# Patient Record
Sex: FEMALE | Race: BLACK OR AFRICAN AMERICAN | Employment: OTHER | RURAL
[De-identification: names, ages, dates, MRNs, and addresses within clinical notes are randomized per-mention and may not be internally consistent; named-entity substitution may affect disease eponyms.]

---

## 2017-01-18 ENCOUNTER — OFFICE VISIT (OUTPATIENT)
Dept: FAMILY MEDICINE CLINIC | Age: 77
End: 2017-01-18

## 2017-01-18 ENCOUNTER — TELEPHONE (OUTPATIENT)
Dept: FAMILY MEDICINE CLINIC | Age: 77
End: 2017-01-18

## 2017-01-18 ENCOUNTER — DOCUMENTATION ONLY (OUTPATIENT)
Dept: FAMILY MEDICINE CLINIC | Age: 77
End: 2017-01-18

## 2017-01-18 VITALS
BODY MASS INDEX: 33.12 KG/M2 | OXYGEN SATURATION: 97 % | SYSTOLIC BLOOD PRESSURE: 173 MMHG | WEIGHT: 211 LBS | RESPIRATION RATE: 18 BRPM | TEMPERATURE: 97.4 F | DIASTOLIC BLOOD PRESSURE: 79 MMHG | HEART RATE: 77 BPM | HEIGHT: 67 IN

## 2017-01-18 DIAGNOSIS — R29.898 MUSCULAR DECONDITIONING: Primary | ICD-10-CM

## 2017-01-18 DIAGNOSIS — M48.061 LUMBAR SPINAL STENOSIS: ICD-10-CM

## 2017-01-18 DIAGNOSIS — E66.09 NON MORBID OBESITY DUE TO EXCESS CALORIES: ICD-10-CM

## 2017-01-18 DIAGNOSIS — M48.02 CERVICAL SPINAL STENOSIS: ICD-10-CM

## 2017-01-18 NOTE — ACP (ADVANCE CARE PLANNING)
Advance Care Planning (ACP) Provider Conversation Snapshot    Date of ACP Conversation: 01/18/17  Persons included in Conversation:  patient  Length of ACP Conversation in minutes:  2 minutes    Authorized Decision Maker (if patient is incapable of making informed decisions): This person is: Other Legally Authorized Decision Maker (e.g. Next of Kin)          For Patients with Decision Making Capacity:   Values/Goals: Exploration of values, goals, and preferences if recovery is not expected, even with continued medical treatment in the event of:  Imminent death  Severe, permanent brain injury  \"In these circumstances, what matters most to you? \"  Care focused more on comfort or quality of life.     Conversation Outcomes / Follow-Up Plan:   Recommended completion of Advance Directive form after review of ACP materials and conversation with prospective healthcare agent

## 2017-01-18 NOTE — MR AVS SNAPSHOT
Visit Information Date & Time Provider Department Dept. Phone Encounter #  
 1/18/2017 10:40 AM Lexx Jiang MD [facility] [phone] [id number] Upcoming Health Maintenance Date Due DTaP/Tdap/Td series (1 - Tdap) 4/24/1962 ZOSTER VACCINE AGE 60> 4/24/2001 GLAUCOMA SCREENING Q2Y 4/24/2006 OSTEOPOROSIS SCREENING (DEXA) 4/24/2006 Pneumococcal 65+ Low/Medium Risk (1 of 2 - PCV13) 4/24/2006 MEDICARE YEARLY EXAM 4/24/2006 INFLUENZA AGE 9 TO ADULT 8/1/2016 Allergies as of 1/18/2017  Review Complete On: 1/18/2017 By: Lexx Jiang MD  
  
 Severity Noted Reaction Type Reactions Aspirin  03/28/2016    Not Reported This Time Codeine  03/28/2016    Not Reported This Time Current Immunizations  Never Reviewed No immunizations on file. Not reviewed this visit You Were Diagnosed With   
  
 Codes Comments Muscular deconditioning    -  Primary ICD-10-CM: R08.408 ICD-9-CM: 781.99 Lumbar spinal stenosis     ICD-10-CM: M48.06 
ICD-9-CM: 724.02 Cervical spinal stenosis     ICD-10-CM: M48.02 
ICD-9-CM: 723.0 Non morbid obesity due to excess calories     ICD-10-CM: E66.09 
ICD-9-CM: 278.00 Vitals BP Pulse Temp Resp Height(growth percentile) Weight(growth percentile) 173/79 (BP 1 Location: Left arm, BP Patient Position: Sitting) 77 97.4 °F (36.3 °C) (Oral) 18 5' 7\" (1.702 m) 211 lb (95.7 kg) SpO2 BMI OB Status Smoking Status 97% 33.05 kg/m2 Postmenopausal Former Smoker Vitals History BMI and BSA Data Body Mass Index Body Surface Area 33.05 kg/m 2 2.13 m 2 Preferred Pharmacy Pharmacy Name Phone [facility] [address] [place] [address] [address] [phone] Your Updated Medication List  
  
   
This list is accurate as of: 1/18/17 11:57 AM.  Always use your most recent med list. amLODIPine 10 mg tablet Commonly known as:  Delaney Francoise Take  by mouth daily. CENTRUM SILVER PO Take  by mouth.  
  
 cyanocobalamin 500 mcg tablet Commonly known as:  VITAMIN B12 Take 500 mcg by mouth daily. HEALTHY COLON PO Take  by mouth. hydroCHLOROthiazide 50 mg tablet Commonly known as:  HYDRODIURIL Take 25 mg by mouth daily. losartan 100 mg tablet Commonly known as:  COZAAR Take 100 mg by mouth daily. naproxen 500 mg tablet Commonly known as:  NAPROSYN Take 1 Tab by mouth two (2) times daily (with meals). Indications: OSTEOARTHRITIS, PAIN  
  
 omeprazole 40 mg capsule Commonly known as:  PRILOSEC Take 40 mg by mouth daily. VITAMIN D2 PO Take  by mouth. * ZANTAC PO Take  by mouth. * RANITIDINE HCL PO Take  by mouth. * Notice: This list has 2 medication(s) that are the same as other medications prescribed for you. Read the directions carefully, and ask your doctor or other care provider to review them with you. We Performed the Following REFERRAL TO PHYSICAL THERAPY [BJY90 Custom] Comments:  
 Please evaluate patient for muscular deconditioning and a program to restore muscle tone in her torso back and neck. Referral Information Referral ID Referred By Referred To  
  
 [id number] Cassandra Sandhu Not Available Visits Status Start Date End Date 1 New Request 1/18/17 1/18/18 If your referral has a status of pending review or denied, additional information will be sent to support the outcome of this decision. Introducing [facility]! Kajal Kelley introduces [employer] patient portal. Now you can access parts of your medical record, email your doctor's office, and request medication refills online. 1. In your internet browser, go to [email or web address] 2. Click on the First Time User? Click Here link in the Sign In box. You will see the New Member Sign Up page. 3. Enter your [employer] Access Code exactly as it appears below. You will not need to use this code after youve completed the sign-up process. If you do not sign up before the expiration date, you must request a new code. · [employer] Access Code: [email or web address] Expires: 4/18/2017 11:57 AM 
 
4. Enter the last four digits of your Social Security Number (xxxx) and Date of Birth (mm/dd/yyyy) as indicated and click Submit. You will be taken to the next sign-up page. 5. Create a Domain Apps ID. This will be your Domain Apps login ID and cannot be changed, so think of one that is secure and easy to remember. 6. Create a [employer] Apps password. You can change your password at any time. 7. Enter your Password Reset Question and Answer. This can be used at a later time if you forget your password. 8. Enter your e-mail address. You will receive e-mail notification when new information is available in [address] 9. Click Sign Up. You can now view and download portions of your medical record. 10. Click the Download Summary menu link to download a portable copy of your medical information. If you have questions, please visit the Frequently Asked Questions section of the [employer] website. Remember, [employer] is NOT to be used for urgent needs. For medical emergencies, dial 911. Now available from your iPhone and Android! Please provide this summary of care documentation to your next provider. Your primary care clinician is listed as Lindsay Torres. If you have any questions after today's visit, please call [phone].

## 2017-01-18 NOTE — PROGRESS NOTES
Rob De Leon is a 77 y.o. female who presents with the following:  Chief Complaint   Patient presents with    Abdominal Pain    Muscle Pain       Abdominal Pain   The history is provided by the patient (The patient does not have abdominal pain but is concerned about her abdomen ballooning out. She is 5 foot 7 inches and weighs 211 pounds. Her muscles had very little tone and she wonders about using a girdle to hold everything in.). Pertinent negatives include no chest pain, no abdominal pain (Patient does not have abdominal pain but an expanding abdomen she is concerned about), no headaches and no shortness of breath. Muscle Pain          Allergies   Allergen Reactions    Aspirin Not Reported This Time    Codeine Not Reported This Time       Current Outpatient Prescriptions   Medication Sig    RANITIDINE HCL (ZANTAC PO) Take  by mouth.  amLODIPine (NORVASC) 10 mg tablet Take  by mouth daily.  RANITIDINE HCL PO Take  by mouth.  losartan (COZAAR) 100 mg tablet Take 100 mg by mouth daily.  omeprazole (PRILOSEC) 40 mg capsule Take 40 mg by mouth daily.  L. ACIDOPHILUS/B.BIFIDUM&LONGUM (HEALTHY COLON PO) Take  by mouth.  ERGOCALCIFEROL, VITAMIN D2, (VITAMIN D2 PO) Take  by mouth.  cyanocobalamin (VITAMIN B12) 500 mcg tablet Take 500 mcg by mouth daily.  FOLIC ACID/MULTIVIT-MIN/LUTEIN (CENTRUM SILVER PO) Take  by mouth.  naproxen (NAPROSYN) 500 mg tablet Take 1 Tab by mouth two (2) times daily (with meals). Indications: OSTEOARTHRITIS, PAIN    hydrochlorothiazide (HYDRODIURIL) 50 mg tablet Take 25 mg by mouth daily. No current facility-administered medications for this visit.         Past Medical History   Diagnosis Date    Diverticula of colon     GERD (gastroesophageal reflux disease)     Hypertension     Joint pain        Past Surgical History   Procedure Laterality Date    Hx other surgical       tumor on right side    Hx cataract removal      Hx tumor removal       fatty tumor upper left arm       Family History   Problem Relation Age of Onset    Breast Cancer Mother     Diabetes Mother     Heart Disease Mother     Hypertension Mother        Social History     Social History    Marital status:      Spouse name: N/A    Number of children: N/A    Years of education: N/A     Social History Main Topics    Smoking status: Former Smoker     Packs/day: 0.50     Types: Cigarettes    Smokeless tobacco: Never Used    Alcohol use No    Drug use: No    Sexual activity: Not Asked     Other Topics Concern    None     Social History Narrative       Review of Systems   Respiratory: Negative for shortness of breath. Cardiovascular: Negative for chest pain. Gastrointestinal: Negative for abdominal pain (Patient does not have abdominal pain but an expanding abdomen she is concerned about). Neurological: Negative for headaches. Visit Vitals    /79 (BP 1 Location: Left arm, BP Patient Position: Sitting)  Comment: Patient has not taken BP med this am.    Pulse 77    Temp 97.4 °F (36.3 °C) (Oral)    Resp 18    Ht 5' 7\" (1.702 m)    Wt 211 lb (95.7 kg)    SpO2 97%    BMI 33.05 kg/m2     Physical Exam   Constitutional: She is oriented to person, place, and time and well-developed, well-nourished, and in no distress. The patient is pleasant but she is obese and has very poor muscle tone in her torso   HENT:   Head: Normocephalic and atraumatic. Right Ear: External ear normal.   Left Ear: External ear normal.   Mouth/Throat: Oropharynx is clear and moist.   Eyes: Conjunctivae and EOM are normal. Pupils are equal, round, and reactive to light. Right eye exhibits no discharge. Left eye exhibits no discharge. Neck: Normal range of motion. Neck supple. No tracheal deviation present. No thyromegaly present. Cardiovascular: Normal rate, regular rhythm, normal heart sounds and intact distal pulses. Exam reveals no gallop and no friction rub.     No murmur heard.  Pulmonary/Chest: Effort normal and breath sounds normal. No respiratory distress. She has no wheezes. She exhibits no tenderness. Abdominal: Soft. Bowel sounds are normal. She exhibits no distension and no mass. There is no tenderness. There is no rebound and no guarding. Musculoskeletal: She exhibits no edema or tenderness. Lymphadenopathy:     She has no cervical adenopathy. Neurological: She is alert and oriented to person, place, and time. She has normal reflexes. No cranial nerve deficit. She exhibits normal muscle tone. Gait normal. Coordination normal.   Skin: Skin is warm and dry. No rash noted. No erythema. No pallor. Psychiatric: Mood, memory, affect and judgment normal.         ICD-10-CM ICD-9-CM    1. Muscular deconditioning R29.898 781.99 REFERRAL TO PHYSICAL THERAPY   2. Lumbar spinal stenosis M48.06 724.02 REFERRAL TO PHYSICAL THERAPY   3. Cervical spinal stenosis M48.02 723.0 REFERRAL TO PHYSICAL THERAPY   4. Non morbid obesity due to excess calories E66.09 278.00      Told the patient I want her to lose 30-40 pounds by reducing her intake and increasing her exercise.   Orders Placed This Encounter    REFERRAL TO PHYSICAL THERAPY     Referral Priority:   Routine     Referral Type:   PT/OT/ST     Referral Reason:   Specialty Services Required       Follow-up Disposition: Not on Pradeep Velasquez MD

## 2017-01-20 ENCOUNTER — TELEPHONE (OUTPATIENT)
Dept: FAMILY MEDICINE CLINIC | Age: 77
End: 2017-01-20

## 2017-01-20 NOTE — TELEPHONE ENCOUNTER
Patient called stating that she has a PT appt on 1-30 and was seen by Dr. Mayelin Newell this week. Patient has been taking tylenol per Dr. Radha Francis but patient is requesting a muscle relaxer be sent in to [facility] pharmacy if possible.

## 2017-02-14 ENCOUNTER — OFFICE VISIT (OUTPATIENT)
Dept: FAMILY MEDICINE CLINIC | Age: 77
End: 2017-02-14

## 2017-02-14 VITALS
OXYGEN SATURATION: 98 % | WEIGHT: 207.6 LBS | TEMPERATURE: 98.6 F | HEIGHT: 67 IN | BODY MASS INDEX: 32.58 KG/M2 | HEART RATE: 81 BPM | RESPIRATION RATE: 20 BRPM | SYSTOLIC BLOOD PRESSURE: 145 MMHG | DIASTOLIC BLOOD PRESSURE: 61 MMHG

## 2017-02-14 DIAGNOSIS — J40 TRACHEOBRONCHITIS: ICD-10-CM

## 2017-02-14 DIAGNOSIS — R68.89 FLU-LIKE SYMPTOMS: Primary | ICD-10-CM

## 2017-02-14 LAB
BINAX NOW INFLUENZA: NEGATIVE
VALID INTERNAL CONTROL?: YES

## 2017-02-14 RX ORDER — AZITHROMYCIN 250 MG/1
TABLET, FILM COATED ORAL
Qty: 6 TAB | Refills: 0 | Status: SHIPPED | OUTPATIENT
Start: 2017-02-14 | End: 2017-02-19

## 2017-02-14 NOTE — PATIENT INSTRUCTIONS
Home, rest, lots of fluids, vaporizer if needed. Finish antibiotics  Xray  mucinex DM if needed. Follow up if worse or not better next 3-5 days.

## 2017-02-14 NOTE — MR AVS SNAPSHOT
Visit Information Date & Time Provider Department Dept. Phone Encounter #  
 2/14/2017 11:00 AM Johnnie Kaufman MD [address] [place] [address] [phone] [id number] Follow-up Instructions Return if symptoms worsen or fail to improve. Follow-up and Disposition History Upcoming Health Maintenance Date Due DTaP/Tdap/Td series (1 - Tdap) 4/24/1962 ZOSTER VACCINE AGE 60> 4/24/2001 GLAUCOMA SCREENING Q2Y 4/24/2006 OSTEOPOROSIS SCREENING (DEXA) 4/24/2006 Pneumococcal 65+ Low/Medium Risk (1 of 2 - PCV13) 4/24/2006 MEDICARE YEARLY EXAM 4/24/2006 INFLUENZA AGE 9 TO ADULT 8/1/2016 Allergies as of 2/14/2017  Review Complete On: 2/14/2017 By: Johnnie Kaufman MD  
  
 Severity Noted Reaction Type Reactions Aspirin  03/28/2016    Not Reported This Time Codeine  03/28/2016    Not Reported This Time Current Immunizations  Never Reviewed No immunizations on file. Not reviewed this visit You Were Diagnosed With   
  
 Codes Comments Flu-like symptoms    -  Primary ICD-10-CM: R68.89 ICD-9-CM: 780.99 Tracheobronchitis     ICD-10-CM: J40 ICD-9-CM: 203 Vitals BP Pulse Temp Resp Height(growth percentile) Weight(growth percentile) 145/61 (BP 1 Location: Left arm, BP Patient Position: Sitting) 81 98.6 °F (37 °C) (Temporal) 20 5' 7\" (1.702 m) 207 lb 9.6 oz (94.2 kg) SpO2 BMI OB Status Smoking Status 98% 32.51 kg/m2 Postmenopausal Former Smoker Vitals History BMI and BSA Data Body Mass Index Body Surface Area 32.51 kg/m 2 2.11 m 2 Preferred Pharmacy Pharmacy Name Phone [facility] [address] [phone] Your Updated Medication List  
  
   
This list is accurate as of: 2/14/17 11:39 AM.  Always use your most recent med list. amLODIPine 10 mg tablet Commonly known as:  Jamel Alexander Take  by mouth daily. azithromycin 250 mg tablet Commonly known as:  Elsworth Heaps Take 2 tablets today, then take 1 tablet daily CENTRUM SILVER PO Take  by mouth.  
  
 cyanocobalamin 500 mcg tablet Commonly known as:  VITAMIN B12 Take 500 mcg by mouth daily. HEALTHY COLON PO Take  by mouth. hydroCHLOROthiazide 50 mg tablet Commonly known as:  HYDRODIURIL Take 25 mg by mouth daily. losartan 100 mg tablet Commonly known as:  COZAAR Take 100 mg by mouth daily. naproxen 500 mg tablet Commonly known as:  NAPROSYN Take 1 Tab by mouth two (2) times daily (with meals). Indications: OSTEOARTHRITIS, PAIN  
  
 omeprazole 40 mg capsule Commonly known as:  PRILOSEC Take 40 mg by mouth daily. VITAMIN D2 PO Take  by mouth. ZANTAC PO Take  by mouth. Prescriptions Sent to Pharmacy Refills  
 azithromycin (ZITHROMAX) 250 mg tablet 0 Sig: Take 2 tablets today, then take 1 tablet daily Class: Normal  
 Pharmacy: [address] #: [phone] We Performed the Following AMB POC BINAX NOW INFLUENZA TEST [78903 CPT(R)] Follow-up Instructions Return if symptoms worsen or fail to improve. To-Do List   
 02/15/2017 Imaging:  XR CHEST PA LAT Patient Instructions Home, rest, lots of fluids, vaporizer if needed. Finish antibiotics Xray 
mucinex DM if needed. Follow up if worse or not better next 3-5 days. Patient Instructions History Introducing [facility]! Crystal Ramirez introduces [employer] patient portal. Now you can access parts of your medical record, email your doctor's office, and request medication refills online. 1. In your internet browser, go to [email or web address] 2. Click on the First Time User? Click Here link in the Sign In box. You will see the New Member Sign Up page. 3. Enter your [employer] Access Code exactly as it appears below. You will not need to use this code after youve completed the sign-up process. If you do not sign up before the expiration date, you must request a new code. · [employer] Access Code: [email or web address] Expires: 4/18/2017 11:57 AM 
 
4. Enter the last four digits of your Social Security Number (xxxx) and Date of Birth (mm/dd/yyyy) as indicated and click Submit. You will be taken to the next sign-up page. 5. Create a Trans[employer] ID. This will be your [employer] login ID and cannot be changed, so think of one that is secure and easy to remember. 6. Create a [employer] password. You can change your password at any time. 7. Enter your Password Reset Question and Answer. This can be used at a later time if you forget your password. 8. Enter your e-mail address. You will receive e-mail notification when new information is available in [address] 9. Click Sign Up. You can now view and download portions of your medical record. 10. Click the Download Summary menu link to download a portable copy of your medical information. If you have questions, please visit the Frequently Asked Questions section of the [employer] website. Remember, [employer] is NOT to be used for urgent needs. For medical emergencies, dial 911. Now available from your iPhone and Android! Please provide this summary of care documentation to your next provider. Your primary care clinician is listed as Jeffy Holt. If you have any questions after today's visit, please call [phone].

## 2017-02-14 NOTE — PROGRESS NOTES
Chief Complaint   Patient presents with    Cough     cough, drainage, congestion, forehead feels heavy     No morning medications taken this morning. Nacho Bethea LPN

## 2017-02-14 NOTE — PROGRESS NOTES
Irena Gillis is a 77 y.o. female who presents to the office today with the following:  Chief Complaint   Patient presents with    Cough     cough, drainage, congestion, forehead feels heavy       Allergies   Allergen Reactions    Aspirin Not Reported This Time    Codeine Not Reported This Time       Current Outpatient Prescriptions   Medication Sig    RANITIDINE HCL (ZANTAC PO) Take  by mouth.  amLODIPine (NORVASC) 10 mg tablet Take  by mouth daily.  losartan (COZAAR) 100 mg tablet Take 100 mg by mouth daily.  omeprazole (PRILOSEC) 40 mg capsule Take 40 mg by mouth daily.  L. ACIDOPHILUS/B.BIFIDUM&LONGUM (HEALTHY COLON PO) Take  by mouth.  ERGOCALCIFEROL, VITAMIN D2, (VITAMIN D2 PO) Take  by mouth.  cyanocobalamin (VITAMIN B12) 500 mcg tablet Take 500 mcg by mouth daily.  hydrochlorothiazide (HYDRODIURIL) 50 mg tablet Take 25 mg by mouth daily.  FOLIC ACID/MULTIVIT-MIN/LUTEIN (CENTRUM SILVER PO) Take  by mouth.  naproxen (NAPROSYN) 500 mg tablet Take 1 Tab by mouth two (2) times daily (with meals). Indications: OSTEOARTHRITIS, PAIN     No current facility-administered medications for this visit. Past Medical History   Diagnosis Date    Diverticula of colon     GERD (gastroesophageal reflux disease)     Hypertension     Joint pain        Past Surgical History   Procedure Laterality Date    Hx other surgical       tumor on right side    Hx cataract removal      Hx tumor removal       fatty tumor upper left arm       History   Smoking Status    Former Smoker    Packs/day: 0.50    Types: Cigarettes   Smokeless Tobacco    Never Used       Family History   Problem Relation Age of Onset    Breast Cancer Mother     Diabetes Mother     Heart Disease Mother     Hypertension Mother          History of Present Illness:  Patient here for evaluation upper respiratory symptoms. She gets her routine care at the [facility] office  Patient states she felt well yesterday.   Last night she developed some pain right shoulder. She does have a history of arthritis here. Shortly after that she developed some nasal congestion with postnasal drip. She has a \"heavy\" feeling in her head. Later on the evening she developed a persistent raspy cough. She describes a rattling sound in her throat/trachea area. No fever no chills. No shortness of breath. Patient does not smoke    Patient did not get a flu shot yet this year    Patient does have hypertension. Blood pressure improved today from her most recent visit. She has not taken her blood pressure medicine yet today. Review of Systems:    Review of systems negative except as noted above      Physical Exam:  Visit Vitals    /61 (BP 1 Location: Left arm, BP Patient Position: Sitting)    Pulse 81    Temp 98.6 °F (37 °C) (Temporal)    Resp 20    Ht 5' 7\" (1.702 m)    Wt 207 lb 9.6 oz (94.2 kg)    SpO2 98%    BMI 32.51 kg/m2     Vitals:    02/14/17 1043   BP: 145/61   BP 1 Location: Left arm   BP Patient Position: Sitting   Pulse: 81   Resp: 20   Temp: 98.6 °F (37 °C)   TempSrc: Temporal   SpO2: 98%   Weight: 207 lb 9.6 oz (94.2 kg)   Height: 5' 7\" (1.702 m)     Patient no acute distress vitals as above. O2 sat excellent. Afebrile  Head was normocephalic  External ears were normal.  Ear canals normal.  TMs were clear  Nose external ears normal.  No lesions. Pulse congestion   with clear rhinorrhea  OP Mucosa normal.  Pharynx normal.  No erythema or exudate. Structures midline  Neck no nodes no masses  Chest had some breath sounds at the base of her lungs right greater than left. No wheezes rhonchi or rales. Good air exchange  Cor regular rate and rhythm no murmurs. Rapid flu neg     1. Flu-like symptoms    - AMB POC BINAX NOW INFLUENZA TEST    2. Tracheobronchitis  Given history, initial presentation right shoulder pain asymmetric on exam I am going to treat her with Zithromax and check a chest x-ray. Nexium.   She will follow-up if worse or not better next several days      Patient Instructions   Home, rest, lots of fluids, vaporizer if needed. Finish antibiotics  Xray  mucinex DM if needed. Follow up if worse or not better next 3-5 days. Continue current therapy plan except for indicated above. Verbal and written instructions (see AVS) provided.  Patient expresses understanding of diagnosis and treatment plan. Follow-up Disposition: Not on [address] [place] [address].  Meg Marcial MD

## 2017-02-16 ENCOUNTER — TELEPHONE (OUTPATIENT)
Dept: FAMILY MEDICINE CLINIC | Age: 77
End: 2017-02-16

## 2017-02-16 NOTE — TELEPHONE ENCOUNTER
Patient notified by phone of current chest xray. Per verbal/written order from Dr. Jewel Oliver:   1-neg cxr  2-continue same meds.   3-follow up if symptoms persist.  Juana Saini LPN

## 2017-02-17 DIAGNOSIS — J40 TRACHEOBRONCHITIS: ICD-10-CM

## 2019-02-04 ENCOUNTER — HOSPITAL ENCOUNTER (OUTPATIENT)
Dept: MRI IMAGING | Age: 79
Discharge: HOME | End: 2019-02-04
Attending: OBSTETRICS & GYNECOLOGY
Payer: MEDICARE

## 2019-02-04 DIAGNOSIS — R10.2 PELVIC AND PERINEAL PAIN: ICD-10-CM

## 2019-02-04 PROCEDURE — 72158 MRI LUMBAR SPINE W/O & W/DYE: CPT

## 2019-02-04 PROCEDURE — A9575 INJ GADOTERATE MEGLUMI 0.1ML: HCPCS | Performed by: OBSTETRICS & GYNECOLOGY

## 2019-02-04 PROCEDURE — 74011250636 HC RX REV CODE- 250/636: Performed by: OBSTETRICS & GYNECOLOGY

## 2019-02-04 PROCEDURE — 72197 MRI PELVIS W/O & W/DYE: CPT

## 2019-02-04 RX ORDER — GADOTERATE MEGLUMINE 376.9 MG/ML
20 INJECTION INTRAVENOUS
Status: COMPLETED | OUTPATIENT
Start: 2019-02-04 | End: 2019-02-04

## 2019-02-04 RX ADMIN — GADOTERATE MEGLUMINE 20 ML: 376.9 INJECTION INTRAVENOUS at 16:33

## 2020-08-12 ENCOUNTER — OFFICE VISIT (OUTPATIENT)
Dept: PRIMARY CARE CLINIC | Age: 80
End: 2020-08-12

## 2020-08-12 DIAGNOSIS — Z20.828 EXPOSURE TO SARS-ASSOCIATED CORONAVIRUS: Primary | ICD-10-CM

## 2020-08-14 LAB — SARS-COV-2, NAA: NOT DETECTED

## 2020-08-17 ENCOUNTER — TELEPHONE (OUTPATIENT)
Dept: PRIMARY CARE CLINIC | Age: 80
End: 2020-08-17

## 2021-09-16 ENCOUNTER — HOSPITAL ENCOUNTER (EMERGENCY)
Age: 81
Discharge: HOME | End: 2021-09-17
Attending: FAMILY MEDICINE
Payer: MEDICARE

## 2021-09-16 ENCOUNTER — APPOINTMENT (OUTPATIENT)
Dept: CT IMAGING | Age: 81
End: 2021-09-16
Attending: FAMILY MEDICINE
Payer: MEDICARE

## 2021-09-16 DIAGNOSIS — R10.12 ABDOMINAL PAIN, LUQ (LEFT UPPER QUADRANT): Primary | ICD-10-CM

## 2021-09-16 DIAGNOSIS — K86.89 PANCREATIC MASS: ICD-10-CM

## 2021-09-16 LAB
ALBUMIN SERPL-MCNC: 3.7 G/DL (ref 3.5–5)
ALBUMIN/GLOB SERPL: 1 {RATIO} (ref 1.1–2.2)
ALP SERPL-CCNC: 95 U/L (ref 45–117)
ALT SERPL-CCNC: 15 U/L (ref 12–78)
ANION GAP SERPL CALC-SCNC: 7 MMOL/L (ref 5–15)
ANION GAP SERPL CALC-SCNC: 9 MMOL/L (ref 5–15)
APPEARANCE UR: CLEAR
AST SERPL-CCNC: 18 U/L (ref 15–37)
BACTERIA URNS QL MICRO: ABNORMAL /HPF
BASOPHILS # BLD: 0 K/UL (ref 0–0.1)
BASOPHILS NFR BLD: 0 % (ref 0–1)
BILIRUB SERPL-MCNC: 0.4 MG/DL (ref 0.2–1)
BILIRUB UR QL: NEGATIVE
BUN SERPL-MCNC: 14 MG/DL (ref 6–20)
BUN SERPL-MCNC: 15 MG/DL (ref 6–20)
BUN/CREAT SERPL: 11 (ref 12–20)
BUN/CREAT SERPL: 12 (ref 12–20)
CALCIUM SERPL-MCNC: 8.9 MG/DL (ref 8.5–10.1)
CALCIUM SERPL-MCNC: 9.1 MG/DL (ref 8.5–10.1)
CHLORIDE SERPL-SCNC: 103 MMOL/L (ref 97–108)
CHLORIDE SERPL-SCNC: 106 MMOL/L (ref 97–108)
CO2 SERPL-SCNC: 27 MMOL/L (ref 21–32)
CO2 SERPL-SCNC: 29 MMOL/L (ref 21–32)
COLOR UR: ABNORMAL
CREAT SERPL-MCNC: 1.21 MG/DL (ref 0.55–1.02)
CREAT SERPL-MCNC: 1.3 MG/DL (ref 0.55–1.02)
DIFFERENTIAL METHOD BLD: ABNORMAL
EOSINOPHIL # BLD: 0.1 K/UL (ref 0–0.4)
EOSINOPHIL NFR BLD: 1 % (ref 0–7)
EPITH CASTS URNS QL MICRO: ABNORMAL /LPF
ERYTHROCYTE [DISTWIDTH] IN BLOOD BY AUTOMATED COUNT: 16.2 % (ref 11.5–14.5)
GLOBULIN SER CALC-MCNC: 3.8 G/DL (ref 2–4)
GLUCOSE SERPL-MCNC: 123 MG/DL (ref 65–100)
GLUCOSE SERPL-MCNC: 181 MG/DL (ref 65–100)
GLUCOSE UR STRIP.AUTO-MCNC: NEGATIVE MG/DL
GRAN CASTS URNS QL MICRO: ABNORMAL /LPF
HCT VFR BLD AUTO: 37.7 % (ref 35–47)
HEMOCCULT STL QL: NEGATIVE
HGB BLD-MCNC: 11.3 G/DL (ref 11.5–16)
HGB UR QL STRIP: ABNORMAL
IMM GRANULOCYTES # BLD AUTO: 0 K/UL (ref 0–0.04)
IMM GRANULOCYTES NFR BLD AUTO: 0 % (ref 0–0.5)
KETONES UR QL STRIP.AUTO: NEGATIVE MG/DL
LACTATE SERPL-SCNC: 1.6 MMOL/L (ref 0.4–2)
LACTATE SERPL-SCNC: 3.3 MMOL/L (ref 0.4–2)
LEUKOCYTE ESTERASE UR QL STRIP.AUTO: ABNORMAL
LIPASE SERPL-CCNC: 157 U/L (ref 73–393)
LYMPHOCYTES # BLD: 2.9 K/UL (ref 0.8–3.5)
LYMPHOCYTES NFR BLD: 50 % (ref 12–49)
MCH RBC QN AUTO: 23.4 PG (ref 26–34)
MCHC RBC AUTO-ENTMCNC: 30 G/DL (ref 30–36.5)
MCV RBC AUTO: 78.1 FL (ref 80–99)
MONOCYTES # BLD: 0.3 K/UL (ref 0–1)
MONOCYTES NFR BLD: 5 % (ref 5–13)
NEUTS SEG # BLD: 2.5 K/UL (ref 1.8–8)
NEUTS SEG NFR BLD: 44 % (ref 32–75)
NITRITE UR QL STRIP.AUTO: POSITIVE
NRBC # BLD: 0 K/UL (ref 0–0.01)
NRBC BLD-RTO: 0 PER 100 WBC
PH UR STRIP: 6 [PH] (ref 5–8)
PLATELET # BLD AUTO: 214 K/UL (ref 150–400)
PMV BLD AUTO: 9.9 FL (ref 8.9–12.9)
POTASSIUM SERPL-SCNC: 4.1 MMOL/L (ref 3.5–5.1)
POTASSIUM SERPL-SCNC: 4.1 MMOL/L (ref 3.5–5.1)
PROT SERPL-MCNC: 7.5 G/DL (ref 6.4–8.2)
PROT UR STRIP-MCNC: ABNORMAL MG/DL
RBC # BLD AUTO: 4.83 M/UL (ref 3.8–5.2)
RBC #/AREA URNS HPF: ABNORMAL /HPF (ref 0–5)
SODIUM SERPL-SCNC: 139 MMOL/L (ref 136–145)
SODIUM SERPL-SCNC: 142 MMOL/L (ref 136–145)
SP GR UR REFRACTOMETRY: 1.02 (ref 1–1.03)
UROBILINOGEN UR QL STRIP.AUTO: 0.2 EU/DL (ref 0.2–1)
WBC # BLD AUTO: 5.8 K/UL (ref 3.6–11)
WBC URNS QL MICRO: ABNORMAL /HPF (ref 0–4)

## 2021-09-16 PROCEDURE — 87077 CULTURE AEROBIC IDENTIFY: CPT

## 2021-09-16 PROCEDURE — 74174 CTA ABD&PLVS W/CONTRAST: CPT

## 2021-09-16 PROCEDURE — 82272 OCCULT BLD FECES 1-3 TESTS: CPT

## 2021-09-16 PROCEDURE — 83605 ASSAY OF LACTIC ACID: CPT

## 2021-09-16 PROCEDURE — 96361 HYDRATE IV INFUSION ADD-ON: CPT

## 2021-09-16 PROCEDURE — 80053 COMPREHEN METABOLIC PANEL: CPT

## 2021-09-16 PROCEDURE — 96365 THER/PROPH/DIAG IV INF INIT: CPT

## 2021-09-16 PROCEDURE — 93005 ELECTROCARDIOGRAM TRACING: CPT

## 2021-09-16 PROCEDURE — 87086 URINE CULTURE/COLONY COUNT: CPT

## 2021-09-16 PROCEDURE — 74011000258 HC RX REV CODE- 258: Performed by: FAMILY MEDICINE

## 2021-09-16 PROCEDURE — 36415 COLL VENOUS BLD VENIPUNCTURE: CPT

## 2021-09-16 PROCEDURE — 87186 SC STD MICRODIL/AGAR DIL: CPT

## 2021-09-16 PROCEDURE — 85025 COMPLETE CBC W/AUTO DIFF WBC: CPT

## 2021-09-16 PROCEDURE — 81001 URINALYSIS AUTO W/SCOPE: CPT

## 2021-09-16 PROCEDURE — 83690 ASSAY OF LIPASE: CPT

## 2021-09-16 PROCEDURE — 74176 CT ABD & PELVIS W/O CONTRAST: CPT

## 2021-09-16 PROCEDURE — 74011000636 HC RX REV CODE- 636: Performed by: FAMILY MEDICINE

## 2021-09-16 PROCEDURE — 99285 EMERGENCY DEPT VISIT HI MDM: CPT

## 2021-09-16 PROCEDURE — 96375 TX/PRO/DX INJ NEW DRUG ADDON: CPT

## 2021-09-16 PROCEDURE — 74011250636 HC RX REV CODE- 250/636: Performed by: FAMILY MEDICINE

## 2021-09-16 RX ORDER — KETOROLAC TROMETHAMINE 15 MG/ML
15 INJECTION, SOLUTION INTRAMUSCULAR; INTRAVENOUS
Status: COMPLETED | OUTPATIENT
Start: 2021-09-16 | End: 2021-09-16

## 2021-09-16 RX ORDER — SODIUM CHLORIDE 9 MG/ML
1000 INJECTION, SOLUTION INTRAVENOUS ONCE
Status: COMPLETED | OUTPATIENT
Start: 2021-09-16 | End: 2021-09-16

## 2021-09-16 RX ORDER — FLUTICASONE PROPIONATE 50 MCG
SPRAY, SUSPENSION (ML) NASAL
COMMUNITY

## 2021-09-16 RX ORDER — LOVASTATIN 20 MG/1
TABLET ORAL
COMMUNITY

## 2021-09-16 RX ORDER — SODIUM CHLORIDE 0.9 % (FLUSH) 0.9 %
5-10 SYRINGE (ML) INJECTION ONCE
Status: COMPLETED | OUTPATIENT
Start: 2021-09-16 | End: 2021-09-16

## 2021-09-16 RX ORDER — DICLOFENAC SODIUM 10 MG/G
GEL TOPICAL
COMMUNITY

## 2021-09-16 RX ORDER — EZETIMIBE 10 MG/1
TABLET ORAL
COMMUNITY

## 2021-09-16 RX ADMIN — IOPAMIDOL 100 ML: 755 INJECTION, SOLUTION INTRAVENOUS at 21:31

## 2021-09-16 RX ADMIN — SODIUM CHLORIDE 1000 ML/HR: 9 INJECTION, SOLUTION INTRAVENOUS at 22:30

## 2021-09-16 RX ADMIN — KETOROLAC TROMETHAMINE 15 MG: 15 INJECTION, SOLUTION INTRAMUSCULAR; INTRAVENOUS at 19:56

## 2021-09-16 RX ADMIN — SODIUM CHLORIDE 1000 ML: 9 INJECTION, SOLUTION INTRAVENOUS at 20:23

## 2021-09-16 RX ADMIN — CEFTRIAXONE SODIUM 1 G: 1 INJECTION, POWDER, FOR SOLUTION INTRAMUSCULAR; INTRAVENOUS at 21:02

## 2021-09-16 RX ADMIN — Medication 10 ML: at 19:32

## 2021-09-16 NOTE — ED TRIAGE NOTES
Had stew and applesauce for dinner, when she got up from table sudden onset of sharp LUQ. Hyperventilating and anxious.  Nausea but no emesis

## 2021-09-16 NOTE — ED PROVIDER NOTES
Dermatology Patient Note  [address] [facility] DERMATOLOGY  [address] [facility]  [address] [place] [address]  Dept: [phone]  Dept Fax: [phone]      VISIT DATE: 10/29/2018   REFERRING PROVIDER: No ref. provider found      Ector Benites is a 46 y.o. female  who presents today in the office for:    Follow-up (Pt was seen for seb derm on the scalp and clobetasol and ketoconazole have helped but now she is losing hair)      HISTORY OF PRESENT ILLNESS:  HPI Rash Followup:    Aminta Ly was seen today for follow-up evaluation of Seb Derm    Interim Course: Resolved    Areas of Involvement: Scalp    Associated Symptoms: Itching    Exacerbating Factors: none    Current Medications for this Rash:  Keto shampoo weekly, CLobetasol son once every 2-3 weeks     Rash Treatment Compliance:  Using all Topical Medications as Prescribed at Last Visit    Side Effects from Treatments: None    Interim  Evaluation: None      Also reports diffuse shedding recently with partline widening - she wonders if could be clobetasol - notable shedding is not in area of application of clobetasol. She did notable change OCP 3 months ago. CURRENT MEDICATIONS:   Current Outpatient Prescriptions   Medication Sig Dispense Refill    clobetasol (TEMOVATE) 0.05 % external solution Apply daily to scaly or itchy areas on scalp 60 mL 3    ketoconazole (NIZORAL) 2 % shampoo Apply 3 x's weekly leave on for 5 minutes prior to washing off 120 mL 11    norethindrone-ethinyl estradiol (JUNEL FE 1/20) 1-20 MG-MCG per tablet Take 1 tablet by mouth daily      albuterol sulfate HFA (PROAIR HFA) 108 (90 BASE) MCG/ACT inhaler Inhale 2 puffs into the lungs every 6 hours as needed for Wheezing 1 Inhaler 3     No current facility-administered medications for this visit.         ALLERGIES:   Allergies   Allergen Reactions    Sulfa Antibiotics        SOCIAL HISTORY:  Social History   Substance Use Topics    Smoking status: Never Smoker    Smokeless Patient is an 81-year-old female with a history of diverticuli, GERD, hypertension and arthritis who presents with left upper quadrant pain. She is also noted to have a history of cholelithiasis. She stated that after eating tonight she had abrupt onset of sharp left upper quadrant pain which appear to be more anterior underneath the left ribs. This did not radiate. It was associated with nausea but no vomiting. Pain was intense and patient stated that she was breathing rapidly because the pain was so intense. She denied pleuritic chest pain or shortness of breath. She has had no cough. No fever, sweats, chills. No substernal chest pain neck arm or jaw pain. No sweats. No palpitations. Patient felt lightheaded, as if she was going to pass out with the intensity of the pain. The pain is lasted approximately 1 hour, is decreased to a 5 out of 10 level at the time of arrival.  It is decreased gradually. Patient states the pain is somewhat colicky in nature but never really goes away. She has had no diarrhea or constipation melena or hematochezia. She denies dysuria urgency frequency or hematuria. She has her usual joint pain which is unchanged. She has no orthopnea PND or dyspnea with exertion. She has no pedal edema or calf pain.   Patient has not experienced pain like this in the past.           Past Medical History:   Diagnosis Date    Diverticula of colon     GERD (gastroesophageal reflux disease)     Hypertension     Joint pain        Past Surgical History:   Procedure Laterality Date    HX CATARACT REMOVAL      HX OTHER SURGICAL      tumor on right side    HX TUMOR REMOVAL      fatty tumor upper left arm         Family History:   Problem Relation Age of Onset    Breast Cancer Mother     Diabetes Mother     Heart Disease Mother     Hypertension Mother        Social History     Socioeconomic History    Marital status:      Spouse name: Not on file    Number of children: Not on file    Years of education: Not on file    Highest education level: Not on file   Occupational History    Not on file   Tobacco Use    Smoking status: Former Smoker     Packs/day: 0.50     Types: Cigarettes    Smokeless tobacco: Never Used   Substance and Sexual Activity    Alcohol use: No     Alcohol/week: 0.0 standard drinks    Drug use: No    Sexual activity: Not Currently     Partners: Male   Other Topics Concern    Not on file   Social History Narrative    Not on file     Social Determinants of Health     Financial Resource Strain:     Difficulty of Paying Living Expenses:    Food Insecurity:     Worried About Running Out of Food in the Last Year:     [address] in the Last Year:    Transportation Needs:     Lack of Transportation (Medical):  Lack of Transportation (Non-Medical):    Physical Activity:     Days of Exercise per Week:     Minutes of Exercise per Session:    Stress:     Feeling of Stress :    Social Connections:     Frequency of Communication with Friends and Family:     Frequency of Social Gatherings with Friends and Family:     Attends Yazdanism Services:     Active Member of Clubs or Organizations:     Attends Club or Organization Meetings:     Marital Status:    Intimate Partner Violence:     Fear of Current or Ex-Partner:     Emotionally Abused:     Physically Abused:     Sexually Abused: ALLERGIES: Aspirin and Codeine    Review of Systems   All other systems reviewed and are negative. Vitals:    09/16/21 2008 09/16/21 2106 09/16/21 2220 09/17/21 0040   BP: (!) 190/88 (!) 230/109 (!) 220/113 (!) 190/107   Pulse: 75 71 72 68   Resp: 21 21 21 20   Temp:       SpO2: 98%  99% 98%   Weight:       Height:                Physical Exam  Vitals and nursing note reviewed. Constitutional:       General: She is not in acute distress. Appearance: Normal appearance. She is well-developed. She is obese. She is not ill-appearing or toxic-appearing. tobacco: Never Used    Alcohol use 0.0 oz/week       REVIEW OF SYSTEMS:  Review of Systems   Constitutional: Negative. Skin:Denies any new changing, growing or bleeding lesions or rashes except as described in the HPI     PHYSICAL EXAM:   /62 (Site: Right Upper Arm, Position: Sitting, Cuff Size: Medium Adult)   Pulse 97   Ht 5' 6\" (1.676 m)   Wt 149 lb 3.2 oz (67.7 kg)   LMP 10/03/2018   SpO2 98%   BMI 24.08 kg/m²     General Exam:  General Appearance: No acute distress, Well nourished     Neuro: Alert and oriented to person, place and time  Psych: Normal affect   Lymph Node: Not performed    Cutaneous Exam: Performed as documented in clinic note below. Sun-exposed skin,which includes the head/face, neck, both arms, digits and/or nails was examined. Pertinent Physical Exam Findings:  Physical Exam   Skin:            Medical Necessity of Exam Performed:   Distribution of patient concerns    Additional Diagnostic Testing performed during exam: Not performed ,  Not performed    ASSESSMENT:   Diagnosis Orders   1. Telogen effluvium     2. Seborrheic dermatitis of scalp  ketoconazole (NIZORAL) 2 % shampoo       Plan of Action is as Follows:  Assessment 1. Telogen effluvium  - Hair shedding most likely secondary to change in OCP 3 months ago  - Rogaine 5% daily  - If persistent may need to switch to alternative OCP    2. Seborrheic dermatitis of scalp  - Improved  - Clobetasol daily PRN  - ketoconazole (NIZORAL) 2 % shampoo; Apply 3 x's weekly leave on for 5 minutes prior to washing off  Dispense: 120 mL; Refill: 11          Patient Instructions   1. Start applying Rogaine-5% once daily (mens strength)  2. Continue washing hair with ketoconazole 1-2 times weekly  3. Continue applying clobetasol to active patches of seborrheic dermatitis   4.  Follow up in 1 year if needed      Photo surveillance performed: No    Follow-up: 1 year    This note was created with the assistance of raymond Comments: Uncomfortable appearing   HENT:      Head: Normocephalic and atraumatic. Right Ear: External ear normal.      Left Ear: External ear normal.      Nose: Nose normal.      Mouth/Throat:      Mouth: Mucous membranes are moist.      Pharynx: No oropharyngeal exudate or posterior oropharyngeal erythema. Eyes:      Extraocular Movements: Extraocular movements intact. Conjunctiva/sclera: Conjunctivae normal.      Pupils: Pupils are equal, round, and reactive to light. Cardiovascular:      Rate and Rhythm: Normal rate and regular rhythm. Heart sounds: Normal heart sounds. No murmur heard. No friction rub. No gallop. Pulmonary:      Effort: Pulmonary effort is normal. No respiratory distress. Breath sounds: Normal breath sounds. No stridor. No wheezing or rales. Chest:      Chest wall: No tenderness. Abdominal:      General: Bowel sounds are decreased. There is no distension. Palpations: Abdomen is soft. Tenderness: There is abdominal tenderness in the left upper quadrant. There is no right CVA tenderness, left CVA tenderness, guarding or rebound. Genitourinary:     Rectum: Normal. Guaiac result negative. No mass or tenderness. Musculoskeletal:         General: No swelling, tenderness, deformity or signs of injury. Normal range of motion. Cervical back: Normal range of motion and neck supple. No muscular tenderness. Right lower leg: No edema. Left lower leg: No edema. Lymphadenopathy:      Cervical: No cervical adenopathy. Skin:     General: Skin is warm and dry. Capillary Refill: Capillary refill takes less than 2 seconds. Coloration: Skin is not jaundiced or pale. Findings: No rash. Neurological:      General: No focal deficit present. Mental Status: She is alert and oriented to person, place, and time. Cranial Nerves: No cranial nerve deficit. Sensory: No sensory deficit. Motor: No weakness.       Coordination: program.  Although the intention is to generate a document that actually reflects thecontent of the visit, no guarantees can be provided that every mistake has been identified and corrected by editing.     Electronically signed by Yvonne Rooney MD on 10/29/18 at 3:25 PM Coordination normal.      Gait: Gait normal.   Psychiatric:         Mood and Affect: Mood normal.         Behavior: Behavior normal.         Thought Content: Thought content normal.         Judgment: Judgment normal.       Labs -  Recent Results (from the past 24 hour(s))   CBC WITH AUTOMATED DIFF    Collection Time: 09/16/21  7:32 PM   Result Value Ref Range    WBC 5.8 3.6 - 11.0 K/uL    RBC 4.83 3.80 - 5.20 M/uL    HGB 11.3 (L) 11.5 - 16.0 g/dL    HCT 37.7 35.0 - 47.0 %    MCV 78.1 (L) 80.0 - 99.0 FL    MCH 23.4 (L) 26.0 - 34.0 PG    MCHC 30.0 30.0 - 36.5 g/dL    RDW 16.2 (H) 11.5 - 14.5 %    PLATELET 770 150 - 210 K/uL    MPV 9.9 8.9 - 12.9 FL    NRBC 0.0 0  WBC    ABSOLUTE NRBC 0.00 0.00 - 0.01 K/uL    NEUTROPHILS 44 32 - 75 %    LYMPHOCYTES 50 (H) 12 - 49 %    MONOCYTES 5 5 - 13 %    EOSINOPHILS 1 0 - 7 %    BASOPHILS 0 0 - 1 %    IMMATURE GRANULOCYTES 0 0.0 - 0.5 %    ABS. NEUTROPHILS 2.5 1.8 - 8.0 K/UL    ABS. LYMPHOCYTES 2.9 0.8 - 3.5 K/UL    ABS. MONOCYTES 0.3 0.0 - 1.0 K/UL    ABS. EOSINOPHILS 0.1 0.0 - 0.4 K/UL    ABS. BASOPHILS 0.0 0.0 - 0.1 K/UL    ABS. IMM. GRANS. 0.0 0.00 - 0.04 K/UL    DF AUTOMATED     METABOLIC PANEL, COMPREHENSIVE    Collection Time: 09/16/21  7:32 PM   Result Value Ref Range    Sodium 139 136 - 145 mmol/L    Potassium 4.1 3.5 - 5.1 mmol/L    Chloride 103 97 - 108 mmol/L    CO2 27 21 - 32 mmol/L    Anion gap 9 5 - 15 mmol/L    Glucose 181 (H) 65 - 100 mg/dL    BUN 14 6 - 20 MG/DL    Creatinine 1.30 (H) 0.55 - 1.02 MG/DL    BUN/Creatinine ratio 11 (L) 12 - 20      GFR est AA 48 (L) >60 ml/min/1.73m2    GFR est non-AA 39 (L) >60 ml/min/1.73m2    Calcium 9.1 8.5 - 10.1 MG/DL    Bilirubin, total 0.4 0.2 - 1.0 MG/DL    ALT (SGPT) 15 12 - 78 U/L    AST (SGOT) 18 15 - 37 U/L    Alk.  phosphatase 95 45 - 117 U/L    Protein, total 7.5 6.4 - 8.2 g/dL    Albumin 3.7 3.5 - 5.0 g/dL    Globulin 3.8 2.0 - 4.0 g/dL    A-G Ratio 1.0 (L) 1.1 - 2.2     LACTIC ACID    Collection Time: 09/16/21 7:32 PM   Result Value Ref Range    Lactic acid 3.3 (HH) 0.4 - 2.0 MMOL/L   LIPASE    Collection Time: 09/16/21  7:32 PM   Result Value Ref Range    Lipase 157 73 - 393 U/L   EKG, 12 LEAD, INITIAL    Collection Time: 09/16/21  7:34 PM   Result Value Ref Range    Ventricular Rate 81 BPM    Atrial Rate 81 BPM    P-R Interval 188 ms    QRS Duration 84 ms    Q-T Interval 404 ms    QTC Calculation (Bezet) 469 ms    Calculated P Axis 116 degrees    Calculated R Axis 32 degrees    Calculated T Axis 87 degrees    Diagnosis       Normal sinus rhythm  Normal ECG  No previous ECGs available     URINALYSIS W/ RFLX MICROSCOPIC    Collection Time: 09/16/21  8:25 PM   Result Value Ref Range    Color YELLOW/STRAW      Appearance CLEAR CLEAR      Specific gravity 1.022 1.003 - 1.030      pH (UA) 6.0 5.0 - 8.0      Protein TRACE (A) NEG mg/dL    Glucose Negative NEG mg/dL    Ketone Negative NEG mg/dL    Bilirubin Negative NEG      Blood TRACE (A) NEG      Urobilinogen 0.2 0.2 - 1.0 EU/dL    Nitrites Positive (A) NEG      Leukocyte Esterase SMALL (A) NEG     URINE MICROSCOPIC ONLY    Collection Time: 09/16/21  8:25 PM   Result Value Ref Range    WBC 20-50 0 - 4 /hpf    RBC 0-5 0 - 5 /hpf    Epithelial cells FEW FEW /lpf    Bacteria 2+ (A) NEG /hpf    Granular cast 0-2 (A) NEG /lpf   OCCULT BLOOD, STOOL    Collection Time: 09/16/21 10:25 PM   Result Value Ref Range    Occult blood, stool Negative NEG     LACTIC ACID    Collection Time: 09/16/21 10:25 PM   Result Value Ref Range    Lactic acid 1.6 0.4 - 2.0 MMOL/L   METABOLIC PANEL, BASIC    Collection Time: 09/16/21 10:25 PM   Result Value Ref Range    Sodium 142 136 - 145 mmol/L    Potassium 4.1 3.5 - 5.1 mmol/L    Chloride 106 97 - 108 mmol/L    CO2 29 21 - 32 mmol/L    Anion gap 7 5 - 15 mmol/L    Glucose 123 (H) 65 - 100 mg/dL    BUN 15 6 - 20 MG/DL    Creatinine 1.21 (H) 0.55 - 1.02 MG/DL    BUN/Creatinine ratio 12 12 - 20      GFR est AA 52 (L) >60 ml/min/1.73m2    GFR est non-AA 43 (L) >60 ml/min/1.73m2    Calcium 8.9 8.5 - 10.1 MG/DL     Radiologic Studies -   CT Results  (Last 48 hours)               09/16/21 2132  CTA CHEST ABD PELV W CONT Final result    Impression:  1. No aortic aneurysm or dissection. Arterial branch vessels are patent. 2. A 6 mm enhancing mass in the pancreas head adjacent to the common bile duct   is concerning for neoplasm. 3. Additional findings in the abdomen and pelvis are unchanged including an   incompletely characterized liver lesion, cholelithiasis, and distal colonic   diverticulosis. Narrative:  EXAM:  CTA CHEST ABD PELV W CONT       INDICATION: Left upper abdomen pain. Elevated lactate. COMPARISON: CT abdomen pelvis 9/16/2021. TECHNIQUE: Helical thin section chest, abdomen, and pelvis CT following   uneventful intravenous administration of nonionic contrast. Coronal and sagittal   reformats were performed. 3D/MIP post processing was performed. CT dose   reduction was achieved through use of a standardized protocol tailored for this   examination and automatic exposure control for dose modulation. FINDINGS:    The aorta is normal in caliber without aneurysm or dissection. There is a   three-vessel aortic arch. The celiac, superior mesenteric, renal, and inferior   mesenteric artery origins are patent. The common iliac arteries are normal in   caliber. There is aortoiliac atherosclerosis. The visualized thyroid gland is unremarkable. The main pulmonary artery is   normal in caliber. There is no acute pulmonary embolism. There is mild   cardiomegaly. No pericardial effusion. No lymphadenopathy by imaging size   criteria. The lungs are clear. No pleural effusion or pneumothorax. Central airways are   unremarkable. There is a small hiatal hernia. The liver is stable with an incompletely characterized intermediate density   mass. A left liver cyst is unchanged. No follow-up recommended.  There are small   gallstones without biliary duct dilatation. The spleen and adrenal glands are   unremarkable. There is a 6 mm enhancing mass in the pancreas head adjacent to the common bile   duct (series 3, image 110). The kidneys are symmetric without hydronephrosis. There are no enlarged lymph nodes. There is no free fluid or free air. There are no dilated bowel loops. There is distal colonic diverticulosis without   focal adjacent inflammation. The urinary bladder is unremarkable. Uterine fibroids are again noted. 09/16/21 2029  CT ABD PELV WO CONT Final result    Impression:  1. No acute abnormality in the abdomen or pelvis. 2. Incompletely characterized intermediate density liver lesion measuring 4.2   cm. Further evaluation with nonemergent CT or MRI without and with contrast is   suggested if clinically warranted and prior imaging is unavailable for   comparison. 3. Cholelithiasis. Additional chronic/incidental findings as above. Narrative:  EXAM:  CT abdomen pelvis without contrast       INDICATION: Left upper quadrant pain       COMPARISON: None. TECHNIQUE: Helical CT of the abdomen  and pelvis  without contrast. Coronal and   sagittal reformats are performed. CT dose reduction was achieved through use of   a standardized protocol tailored for this examination and automatic exposure   control for dose modulation. FINDINGS:    Solid organ evaluation is limited without contrast.        The visualized lung bases demonstrate no mass or consolidation. The heart size   is normal. There is no pericardial or pleural effusion. There is a small hiatal   hernia. There is no renal, ureteral, or bladder calculus. The kidneys are symmetric   without hydronephrosis. There is no perinephric fluid or fat stranding. There is an incompletely characterized intermediate density liver lesion   measuring 3.8 x 4.2 cm (series 2, image 10).  A liver cyst measures 3.4 cm. No   follow-up recommended. The spleen, pancreas, and adrenal glands are normal.   There are small gallstones without intra- or extra-hepatic biliary dilatation. There are no dilated bowel loops. The appendix is normal. There is distal   colonic diverticulosis without focal adjacent inflammation. There are no enlarged lymph nodes. There is no free fluid or free air. The   aorta tapers without aneurysm. There is aortoiliac atherosclerosis. The urinary bladder is unremarkable. Uterine fibroids are noted. There is no   adnexal mass. There is no aggressive bony lesion. XR Results (most recent):        MDM  Number of Diagnoses or Management Options  Abdominal pain, LUQ (left upper quadrant): new and requires workup     Amount and/or Complexity of Data Reviewed  Clinical lab tests: reviewed and ordered  Tests in the radiology section of CPT®: reviewed and ordered  Tests in the medicine section of CPT®: reviewed and ordered  Decide to obtain previous medical records or to obtain history from someone other than the patient: yes  Review and summarize past medical records: yes  Discuss the patient with other providers: yes (Dr. Nargis Manzano, Dr. Noemí Carlson)    Risk of Complications, Morbidity, and/or Mortality  Presenting problems: high  Diagnostic procedures: high  Management options: high  General comments: Differential includes mesenteric ischemia, bowel obstruction, musculoskeletal pain, kidney stone, UTI, gastritis, ulcer disease, pneumonia, pancreatitis    Patient Progress  Patient progress: improved    ED Course as of Sep 17 0655   Thu Sep 16, 2021   1936 EKG reveals normal sinus rhythm with a normal rate of 81, normal [place] interval of 188, normal QRS duration of 84, QTc is 469, no evidence of acute ischemia or infarction on EKG.     [PS]   5992 CMP notable for glucose of 181, creatinine of 1.3, GFR 48, CBC with a hemoglobin of 11.3, 44% neutrophils 2% lymphocytes 5% monocytes otherwise normal, lipase normal at 157, lactic elevated 3.3. Urinalysis is pending. Patient will be hydrated with normal saline and CT scan will be obtained. Pain is been treated with Toradol. Patient declines offer of nausea medication. [PS]   2057 Pain is still present in LUQ but is improved with Toradol. Noncontrast CT scans unremarkable except for cholelithiasis and liver lesion. Will obtain CTA abd and pelvis to evaluate for possible mesenteric ischemia, hydrate with 2 L NS, recheck lactate and BMP. [PS]   4393 Pain is down to a 1 out of 10. CT scan reveals a possible pancreatic neoplasm. No bowel wall edema is noted. Care discussed with Dr. Kendall Daniel. If lactic acid normalizes and patient is comfortable with minimal pain, non bloody stools, may discharge patient home with follow-up with GI. If lactic acid does not normalize or gets worse patient will need to be transferred to facility with GI and vascular surgery. [PS]   Fri Sep 17, 2021   0023 Care discussed with Dr. Duong Villalobos, gastroenterology, office number is [phone], who suggest clear liquid diet, Cipro Flagyl, follow-up with GI within the next week. Patient understands to return for any return of symptoms. Urine culture is pending. Care discussed with patient and her daughter. She is feeling much better, pain is resolved, lactic acid is normal and stool is nonbloody. She feels comfortable going home understanding return instructions. She will discuss further care with Dr. Melinda Epps, her primary care physician, for GI referral.  We discussed the new presence of a pancreatic mass with the patient and her daughter and she understands that that needs to be followed at that it could be a cancer. She also has an abnormality on her liver which is poorly described and needs to be followed up also. Patient understands that she may have transient mesenteric ischemia and she understands to return promptly for return of symptoms. [PS]      ED Course User Index  [PS] Valentín Ruiz MD       Procedures  Medications   sodium chloride (NS) flush 5-10 mL (10 mL IntraVENous Given 9/16/21 1932)   ketorolac (TORADOL) injection 15 mg (15 mg IntraVENous Given 9/16/21 1956)   sodium chloride 0.9 % bolus infusion 1,000 mL (0 mL IntraVENous IV Completed 9/16/21 2225)   cefTRIAXone (ROCEPHIN) 1 g in 0.9% sodium chloride (MBP/ADV) 50 mL MBP (0 g IntraVENous IV Completed 9/16/21 2150)   iopamidoL (ISOVUE-370) 76 % injection 100 mL (100 mL IntraVENous Given 9/16/21 2131)   0.9% sodium chloride infusion (0 mL/hr IntraVENous IV Completed 9/16/21 2340)       Patient Vitals for the past 8 hrs:   Pulse Resp BP SpO2   09/17/21 0040 68 20 (!) 190/107 98 %           Discharge note:  Pt re-evaluated and noted to be feeling better , ready for discharge. Updated pt and daughter on all final lab and  X-ray  findings. Will follow up as instructed with primary care doctor or return here if worse. All questions have been answered, pt voiced understanding and agreement with plan. Specific return precautions provided as well as instructions to return to the ED should sx worsen at any time. Vital signs stable for discharge. Diagnosis     Clinical Impression:     ICD-10-CM ICD-9-CM    1. Abdominal pain, LUQ (left upper quadrant)  R10.12 789.02    2. Pancreatic mass  K86.89 577.8          PLAN:  1. Discharge Medication List as of 9/17/2021 12:30 AM      START taking these medications    Details   ciprofloxacin HCl (Cipro) 500 mg tablet Take 1 Tablet by mouth two (2) times a day for 7 days. , Normal, Disp-14 Tablet, R-0      metroNIDAZOLE (FlagyL) 500 mg tablet Take 1 Tablet by mouth two (2) times a day for 7 days. , Normal, Disp-14 Tablet, R-0         CONTINUE these medications which have NOT CHANGED    Details   mirabegron ER (Myrbetriq) 25 mg ER tablet Take 1 Tablet by mouth two (2) times a day., Historical Med      lovastatin (MEVACOR) 20 mg tablet lovastatin 20 mg tablet, Historical Med      fluticasone propionate (Flonase Allergy Relief) 50 mcg/actuation nasal spray Flonase Allergy Relief 50 mcg/actuation nasal spray,suspension   Spray 1 spray every day by intranasal route., Historical Med      ezetimibe (ZETIA) 10 mg tablet ezetimibe 10 mg tablet   Take 1 tablet every day by oral route., Historical Med      diclofenac (VOLTAREN) 1 % gel diclofenac 1 % gel topical kit, Historical Med      Vitamin D3-Menaquinone 7 1000-90 unit-mcg TbDi cholecalciferol(vit D3) 1,000 unit-vit K2 90 mcg disintegrating tablet   Take by oral route., Historical Med      DAILY MULTI-VITAMIN PO Daily Multi-Vitamin, Historical Med      acetaminophen (TYLENOL ARTHRITIS PAIN PO) Take 1 Tablet by mouth as needed., Historical Med      RANITIDINE HCL (ZANTAC PO) Take  by mouth., Historical Med      amLODIPine (NORVASC) 10 mg tablet Take  by mouth daily. , Historical Med      losartan (COZAAR) 100 mg tablet Take 100 mg by mouth daily. , Historical Med      omeprazole (PRILOSEC) 40 mg capsule Take 40 mg by mouth daily. , Historical Med      L.ACIDOPHILUS/B.BIFIDUM&LONGUM (HEALTHY COLON PO) Take  by mouth., Historical Med      ERGOCALCIFEROL, VITAMIN D2, (VITAMIN D2 PO) Take  by mouth., Historical Med      cyanocobalamin (VITAMIN B12) 500 mcg tablet Take 500 mcg by mouth daily. , Historical Med      hydrochlorothiazide (HYDRODIURIL) 50 mg tablet Take 25 mg by mouth daily. , Historical Med      FOLIC ACID/MULTIVIT-MIN/LUTEIN (CENTRUM SILVER PO) Take  by mouth., Historical Med           2. Follow-up Information     Follow up With Specialties Details Why Contact Info    [facility], [address] [place] [address] Avenue Today To discuss further evaluation [address]      Devante Mccoy MD Gastroenterology Call today Follow up todays symptoms.  [address]  [address] [facility]  [phone]          Return to ED if worse     Disposition:  Discharged  Home     Please note, this dictation was completed with Kuona, the [employer] voice recognition software. Quite often unanticipated grammatical, syntax, homophones, and other interpretive errors are inadvertently transcribed by the computer software. Please disregard these errors. Please excuse any errors that have escaped final proof reading.

## 2021-09-17 VITALS
SYSTOLIC BLOOD PRESSURE: 190 MMHG | RESPIRATION RATE: 20 BRPM | HEIGHT: 66 IN | BODY MASS INDEX: 36.16 KG/M2 | WEIGHT: 225 LBS | TEMPERATURE: 98 F | OXYGEN SATURATION: 98 % | HEART RATE: 68 BPM | DIASTOLIC BLOOD PRESSURE: 107 MMHG

## 2021-09-17 RX ORDER — METRONIDAZOLE 500 MG/1
500 TABLET ORAL 2 TIMES DAILY
Qty: 14 TABLET | Refills: 0 | Status: SHIPPED | OUTPATIENT
Start: 2021-09-17 | End: 2021-09-24

## 2021-09-17 RX ORDER — CIPROFLOXACIN 500 MG/1
500 TABLET ORAL 2 TIMES DAILY
Qty: 14 TABLET | Refills: 0 | Status: SHIPPED | OUTPATIENT
Start: 2021-09-17 | End: 2021-09-24

## 2021-09-17 NOTE — ED NOTES
Pt to CT via stretcher. Pt verbalizes decrease in LUQ pain 4/10 without n/v. Pt calm and interacting with dgt at bedside.

## 2021-09-17 NOTE — DISCHARGE INSTRUCTIONS
Return for return of symptoms, fever or change in symptoms. Discuss further evaluation with Dr. Za Cordero, possible GI referral, or call Dr. Cameron Turpin office at [phone] after 8:30 to schedule appointment early next week. Clear liquid diet for 24 hours. Cipro and Flagyl antibiotics as prescribed.

## 2021-09-19 LAB
BACTERIA SPEC CULT: ABNORMAL
BACTERIA SPEC CULT: ABNORMAL
CC UR VC: ABNORMAL
SERVICE CMNT-IMP: ABNORMAL

## 2021-09-25 LAB
ATRIAL RATE: 81 BPM
CALCULATED P AXIS, ECG09: 116 DEGREES
CALCULATED R AXIS, ECG10: 32 DEGREES
CALCULATED T AXIS, ECG11: 87 DEGREES
DIAGNOSIS, 93000: NORMAL
P-R INTERVAL, ECG05: 188 MS
Q-T INTERVAL, ECG07: 404 MS
QRS DURATION, ECG06: 84 MS
QTC CALCULATION (BEZET), ECG08: 469 MS
VENTRICULAR RATE, ECG03: 81 BPM

## 2021-09-27 ENCOUNTER — HOSPITAL ENCOUNTER (EMERGENCY)
Age: 81
Discharge: HOME | End: 2021-09-27
Attending: EMERGENCY MEDICINE
Payer: MEDICARE

## 2021-09-27 ENCOUNTER — APPOINTMENT (OUTPATIENT)
Dept: CT IMAGING | Age: 81
End: 2021-09-27
Attending: EMERGENCY MEDICINE
Payer: MEDICARE

## 2021-09-27 VITALS
BODY MASS INDEX: 36.32 KG/M2 | WEIGHT: 225 LBS | DIASTOLIC BLOOD PRESSURE: 86 MMHG | SYSTOLIC BLOOD PRESSURE: 194 MMHG | OXYGEN SATURATION: 100 % | HEART RATE: 75 BPM | RESPIRATION RATE: 16 BRPM

## 2021-09-27 DIAGNOSIS — K80.20 GALLSTONES: ICD-10-CM

## 2021-09-27 DIAGNOSIS — R10.12 ABDOMINAL PAIN, LUQ (LEFT UPPER QUADRANT): Primary | ICD-10-CM

## 2021-09-27 DIAGNOSIS — K76.9 LIVER LESION: ICD-10-CM

## 2021-09-27 LAB
ALBUMIN SERPL-MCNC: 3.6 G/DL (ref 3.5–5)
ALBUMIN/GLOB SERPL: 0.9 {RATIO} (ref 1.1–2.2)
ALP SERPL-CCNC: 97 U/L (ref 45–117)
ALT SERPL-CCNC: 19 U/L (ref 12–78)
ANION GAP SERPL CALC-SCNC: 11 MMOL/L (ref 5–15)
APPEARANCE UR: CLEAR
AST SERPL-CCNC: 14 U/L (ref 15–37)
ATRIAL RATE: 76 BPM
BACTERIA URNS QL MICRO: NEGATIVE /HPF
BASOPHILS # BLD: 0 K/UL (ref 0–0.1)
BASOPHILS NFR BLD: 0 % (ref 0–1)
BILIRUB SERPL-MCNC: 0.3 MG/DL (ref 0.2–1)
BILIRUB UR QL: NEGATIVE
BUN SERPL-MCNC: 17 MG/DL (ref 6–20)
BUN/CREAT SERPL: 12 (ref 12–20)
CALCIUM SERPL-MCNC: 9 MG/DL (ref 8.5–10.1)
CALCULATED P AXIS, ECG09: 80 DEGREES
CALCULATED R AXIS, ECG10: 13 DEGREES
CALCULATED T AXIS, ECG11: 67 DEGREES
CHLORIDE SERPL-SCNC: 101 MMOL/L (ref 97–108)
CO2 SERPL-SCNC: 27 MMOL/L (ref 21–32)
COLOR UR: ABNORMAL
CREAT SERPL-MCNC: 1.37 MG/DL (ref 0.55–1.02)
DIAGNOSIS, 93000: NORMAL
DIFFERENTIAL METHOD BLD: ABNORMAL
EOSINOPHIL # BLD: 0.2 K/UL (ref 0–0.4)
EOSINOPHIL NFR BLD: 2 % (ref 0–7)
EPITH CASTS URNS QL MICRO: NORMAL /LPF
ERYTHROCYTE [DISTWIDTH] IN BLOOD BY AUTOMATED COUNT: 16.7 % (ref 11.5–14.5)
GLOBULIN SER CALC-MCNC: 4.1 G/DL (ref 2–4)
GLUCOSE SERPL-MCNC: 158 MG/DL (ref 65–100)
GLUCOSE UR STRIP.AUTO-MCNC: NEGATIVE MG/DL
HCT VFR BLD AUTO: 40.3 % (ref 35–47)
HGB BLD-MCNC: 12.1 G/DL (ref 11.5–16)
HGB UR QL STRIP: ABNORMAL
IMM GRANULOCYTES # BLD AUTO: 0 K/UL (ref 0–0.04)
IMM GRANULOCYTES NFR BLD AUTO: 0 % (ref 0–0.5)
KETONES UR QL STRIP.AUTO: NEGATIVE MG/DL
LACTATE SERPL-SCNC: 1.5 MMOL/L (ref 0.4–2)
LEUKOCYTE ESTERASE UR QL STRIP.AUTO: NEGATIVE
LIPASE SERPL-CCNC: 349 U/L (ref 73–393)
LYMPHOCYTES # BLD: 2.8 K/UL (ref 0.8–3.5)
LYMPHOCYTES NFR BLD: 36 % (ref 12–49)
MAGNESIUM SERPL-MCNC: 1.8 MG/DL (ref 1.6–2.4)
MCH RBC QN AUTO: 23.5 PG (ref 26–34)
MCHC RBC AUTO-ENTMCNC: 30 G/DL (ref 30–36.5)
MCV RBC AUTO: 78.4 FL (ref 80–99)
MONOCYTES # BLD: 0.4 K/UL (ref 0–1)
MONOCYTES NFR BLD: 5 % (ref 5–13)
NEUTS SEG # BLD: 4.5 K/UL (ref 1.8–8)
NEUTS SEG NFR BLD: 57 % (ref 32–75)
NITRITE UR QL STRIP.AUTO: NEGATIVE
NRBC # BLD: 0 K/UL (ref 0–0.01)
NRBC BLD-RTO: 0 PER 100 WBC
P-R INTERVAL, ECG05: 198 MS
PH UR STRIP: 6.5 [PH] (ref 5–8)
PLATELET # BLD AUTO: 202 K/UL (ref 150–400)
PLATELET COMMENTS,PCOM: ABNORMAL
PMV BLD AUTO: 9.5 FL (ref 8.9–12.9)
POTASSIUM SERPL-SCNC: 3.6 MMOL/L (ref 3.5–5.1)
PROT SERPL-MCNC: 7.7 G/DL (ref 6.4–8.2)
PROT UR STRIP-MCNC: NEGATIVE MG/DL
Q-T INTERVAL, ECG07: 452 MS
QRS DURATION, ECG06: 86 MS
QTC CALCULATION (BEZET), ECG08: 508 MS
RBC # BLD AUTO: 5.14 M/UL (ref 3.8–5.2)
RBC #/AREA URNS HPF: NORMAL /HPF (ref 0–5)
RBC MORPH BLD: ABNORMAL
SODIUM SERPL-SCNC: 139 MMOL/L (ref 136–145)
SP GR UR REFRACTOMETRY: 1.01 (ref 1–1.03)
TROPONIN I SERPL-MCNC: <0.05 NG/ML
UROBILINOGEN UR QL STRIP.AUTO: 0.2 EU/DL (ref 0.2–1)
VENTRICULAR RATE, ECG03: 76 BPM
WBC # BLD AUTO: 7.9 K/UL (ref 3.6–11)
WBC URNS QL MICRO: NORMAL /HPF (ref 0–4)

## 2021-09-27 PROCEDURE — 36415 COLL VENOUS BLD VENIPUNCTURE: CPT

## 2021-09-27 PROCEDURE — 81001 URINALYSIS AUTO W/SCOPE: CPT

## 2021-09-27 PROCEDURE — 83605 ASSAY OF LACTIC ACID: CPT

## 2021-09-27 PROCEDURE — 84484 ASSAY OF TROPONIN QUANT: CPT

## 2021-09-27 PROCEDURE — 74011000636 HC RX REV CODE- 636: Performed by: EMERGENCY MEDICINE

## 2021-09-27 PROCEDURE — 83690 ASSAY OF LIPASE: CPT

## 2021-09-27 PROCEDURE — 74177 CT ABD & PELVIS W/CONTRAST: CPT

## 2021-09-27 PROCEDURE — 96375 TX/PRO/DX INJ NEW DRUG ADDON: CPT

## 2021-09-27 PROCEDURE — 93005 ELECTROCARDIOGRAM TRACING: CPT

## 2021-09-27 PROCEDURE — 80053 COMPREHEN METABOLIC PANEL: CPT

## 2021-09-27 PROCEDURE — 74011250636 HC RX REV CODE- 250/636: Performed by: EMERGENCY MEDICINE

## 2021-09-27 PROCEDURE — 85025 COMPLETE CBC W/AUTO DIFF WBC: CPT

## 2021-09-27 PROCEDURE — 99283 EMERGENCY DEPT VISIT LOW MDM: CPT

## 2021-09-27 PROCEDURE — 96374 THER/PROPH/DIAG INJ IV PUSH: CPT

## 2021-09-27 PROCEDURE — 83735 ASSAY OF MAGNESIUM: CPT

## 2021-09-27 RX ORDER — TRAMADOL HYDROCHLORIDE 50 MG/1
50 TABLET ORAL
Qty: 12 TABLET | Refills: 0 | Status: SHIPPED | OUTPATIENT
Start: 2021-09-27 | End: 2021-09-30

## 2021-09-27 RX ORDER — KETOROLAC TROMETHAMINE 30 MG/ML
15 INJECTION, SOLUTION INTRAMUSCULAR; INTRAVENOUS
Status: COMPLETED | OUTPATIENT
Start: 2021-09-27 | End: 2021-09-27

## 2021-09-27 RX ORDER — ONDANSETRON 4 MG/1
4 TABLET, ORALLY DISINTEGRATING ORAL
Qty: 20 TABLET | Refills: 0 | Status: SHIPPED | OUTPATIENT
Start: 2021-09-27

## 2021-09-27 RX ORDER — SODIUM CHLORIDE 0.9 % (FLUSH) 0.9 %
5-10 SYRINGE (ML) INJECTION ONCE
Status: DISCONTINUED | OUTPATIENT
Start: 2021-09-27 | End: 2021-09-27 | Stop reason: HOSPADM

## 2021-09-27 RX ORDER — MORPHINE SULFATE 2 MG/ML
2 INJECTION, SOLUTION INTRAMUSCULAR; INTRAVENOUS
Status: COMPLETED | OUTPATIENT
Start: 2021-09-27 | End: 2021-09-27

## 2021-09-27 RX ORDER — ONDANSETRON 2 MG/ML
4 INJECTION INTRAMUSCULAR; INTRAVENOUS ONCE
Status: COMPLETED | OUTPATIENT
Start: 2021-09-27 | End: 2021-09-27

## 2021-09-27 RX ORDER — DICYCLOMINE HYDROCHLORIDE 20 MG/1
20 TABLET ORAL
Qty: 15 TABLET | Refills: 0 | Status: SHIPPED | OUTPATIENT
Start: 2021-09-27

## 2021-09-27 RX ADMIN — IOPAMIDOL 100 ML: 612 INJECTION, SOLUTION INTRAVENOUS at 03:15

## 2021-09-27 RX ADMIN — KETOROLAC TROMETHAMINE 15 MG: 30 INJECTION, SOLUTION INTRAMUSCULAR at 02:46

## 2021-09-27 RX ADMIN — SODIUM CHLORIDE 1000 ML: 9 INJECTION, SOLUTION INTRAVENOUS at 02:46

## 2021-09-27 RX ADMIN — MORPHINE SULFATE 2 MG: 2 INJECTION, SOLUTION INTRAMUSCULAR; INTRAVENOUS at 03:50

## 2021-09-27 RX ADMIN — ONDANSETRON 4 MG: 2 INJECTION INTRAMUSCULAR; INTRAVENOUS at 02:46

## 2021-09-27 NOTE — ED TRIAGE NOTES
Patient states that she is having left upper abdominal pain 10/10. States that she was seen two weeks ago for the same symptoms. Patients she did not taken anything for the pain. Patient states recent exposure Wednesday, family member tested positive. Patient denies respiratory issues.

## 2021-09-27 NOTE — ED PROVIDER NOTES
EMERGENCY DEPARTMENT HISTORY AND PHYSICAL EXAM      Date: 9/27/2021  Patient Name: Jefry Tuttle    History of Presenting Illness     Chief Complaint   Patient presents with    Abdominal Pain       History Provided By: Patient    HPI:   The history is provided by the patient. Abdominal Pain   This is a new problem. The current episode started 6 to 12 hours ago. The problem occurs constantly. The problem has not changed since onset. The pain is associated with vomiting. The pain is located in the LUQ. The quality of the pain is aching and sharp. The pain is severe. Associated symptoms include flatus, nausea, vomiting and frequency. Pertinent negatives include no fever, no belching, no diarrhea, no hematochezia, no melena, no constipation, no dysuria, no hematuria, no headaches, no arthralgias, no myalgias, no chest pain and no back pain. The pain is worsened by certain positions. The pain is relieved by certain positions. Past workup includes CT scan. Her past medical history is significant for gallstones. The patient's surgical history non-contributory. Jefry Tuttle, [de-identified] y.o. female  presents to the ED with cc of left upper quadrant abdominal pain that started around 8 PM last night. Patient reports the pain was sharp very severe at onset, she reports holding the area leaning forward makes it better laying down makes it worse. She denies any chest pain shortness of breath cough upper respiratory symptoms. She does report a recent COVID-19 exposure. She reports she had 3 episodes of vomiting with nausea, she denies any diarrhea constipation, she denies any blood in the vomit or blood in the stools. She reports no abdominal surgeries. She denies dysuria but  reports frequency. She is taken no medications. She denies any other known sick contacts.   Patient was seen here 9 days ago for similar symptoms, she was treated for diverticulitis reports she just finished her antibiotics of Cipro and Flagyl today.  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 There are no other complaints, changes, or physical findings at this time. PCP: Jonathan Joshi    No current facility-administered medications on file prior to encounter. Current Outpatient Medications on File Prior to Encounter   Medication Sig Dispense Refill    mirabegron ER (Myrbetriq) 25 mg ER tablet Take 1 Tablet by mouth two (2) times a day.  lovastatin (MEVACOR) 20 mg tablet lovastatin 20 mg tablet      fluticasone propionate (Flonase Allergy Relief) 50 mcg/actuation nasal spray Flonase Allergy Relief 50 mcg/actuation nasal spray,suspension   Spray 1 spray every day by intranasal route.  ezetimibe (ZETIA) 10 mg tablet ezetimibe 10 mg tablet   Take 1 tablet every day by oral route.  diclofenac (VOLTAREN) 1 % gel diclofenac 1 % gel topical kit      Vitamin D3-Menaquinone 7 1000-90 unit-mcg TbDi cholecalciferol(vit D3) 1,000 unit-vit K2 90 mcg disintegrating tablet   Take by oral route.  DAILY MULTI-VITAMIN PO Daily Multi-Vitamin      acetaminophen (TYLENOL ARTHRITIS PAIN PO) Take 1 Tablet by mouth as needed.  RANITIDINE HCL (ZANTAC PO) Take  by mouth. (Patient not taking: Reported on 9/16/2021)      amLODIPine (NORVASC) 10 mg tablet Take  by mouth daily.  losartan (COZAAR) 100 mg tablet Take 100 mg by mouth daily.  omeprazole (PRILOSEC) 40 mg capsule Take 40 mg by mouth daily. (Patient not taking: Reported on 9/16/2021)      L. ACIDOPHILUS/B.BIFIDUM&LONGUM (HEALTHY COLON PO) Take  by mouth.  ERGOCALCIFEROL, VITAMIN D2, (VITAMIN D2 PO) Take  by mouth.  cyanocobalamin (VITAMIN B12) 500 mcg tablet Take 500 mcg by mouth daily.  hydrochlorothiazide (HYDRODIURIL) 50 mg tablet Take 25 mg by mouth daily.  FOLIC ACID/MULTIVIT-MIN/LUTEIN (CENTRUM SILVER PO) Take  by mouth.          Past History     Past Medical History:  Past Medical History:   Diagnosis Date    Diverticula of colon     GERD (gastroesophageal reflux disease)     Hypertension     Joint pain        Past Surgical History:  Past Surgical History:   Procedure Laterality Date    HX CATARACT REMOVAL      HX OTHER SURGICAL      tumor on right side    HX TUMOR REMOVAL      fatty tumor upper left arm       Family History:  Family History   Problem Relation Age of Onset    Breast Cancer Mother     Diabetes Mother     Heart Disease Mother     Hypertension Mother        Social History:  Social History     Tobacco Use    Smoking status: Former Smoker     Packs/day: 0.50     Types: Cigarettes    Smokeless tobacco: Never Used   Substance Use Topics    Alcohol use: No     Alcohol/week: 0.0 standard drinks    Drug use: No       Allergies: Allergies   Allergen Reactions    Aspirin Nausea Only    Codeine Not Reported This Time         Review of Systems   Review of Systems   Constitutional: Negative. Negative for chills and fever. HENT: Negative. Negative for congestion and sore throat. Eyes: Negative. Negative for discharge and redness. Respiratory: Negative. Negative for cough and shortness of breath. Cardiovascular: Negative. Negative for chest pain and palpitations. Gastrointestinal: Positive for abdominal pain, flatus, nausea and vomiting. Negative for blood in stool, constipation, diarrhea, hematochezia and melena. Endocrine: Negative. Negative for polydipsia and polyuria. Genitourinary: Positive for frequency. Negative for dysuria, flank pain and hematuria. Musculoskeletal: Negative. Negative for arthralgias, back pain and myalgias. Skin: Negative. Negative for rash and wound. Allergic/Immunologic: Negative. Neurological: Negative. Negative for dizziness and headaches. Hematological: Negative. Negative for adenopathy. Does not bruise/bleed easily. Psychiatric/Behavioral: Negative. Negative for confusion. The patient is not nervous/anxious.     All other systems reviewed and are negative. Physical Exam   Physical Exam  Vitals and nursing note reviewed. Constitutional:       General: She is not in acute distress. Appearance: Normal appearance. She is well-developed. She is not ill-appearing, toxic-appearing or diaphoretic. Comments: Patient lying in bed holding her left upper quadrant. HENT:      Head: Normocephalic and atraumatic. Nose: Nose normal.      Mouth/Throat:      Mouth: Mucous membranes are moist.      Pharynx: Oropharynx is clear. Eyes:      Extraocular Movements: Extraocular movements intact. Conjunctiva/sclera: Conjunctivae normal.      Pupils: Pupils are equal, round, and reactive to light. Cardiovascular:      Rate and Rhythm: Normal rate and regular rhythm. Pulses: Normal pulses. Pulmonary:      Effort: Pulmonary effort is normal. No respiratory distress. Breath sounds: Normal breath sounds. Chest:      Chest wall: No tenderness. Abdominal:      General: There is no distension. Palpations: Abdomen is soft. There is no mass or pulsatile mass. Tenderness: There is abdominal tenderness in the left upper quadrant. There is no guarding or rebound. Negative signs include Ernandez's sign and McBurney's sign. Hernia: No hernia is present. Musculoskeletal:         General: No swelling, tenderness or signs of injury. Normal range of motion. Cervical back: Normal range of motion and neck supple. No rigidity or tenderness. No muscular tenderness. Skin:     General: Skin is warm and dry. Capillary Refill: Capillary refill takes less than 2 seconds. Findings: No erythema or rash. Neurological:      General: No focal deficit present. Mental Status: She is alert and oriented to person, place, and time. Mental status is at baseline. Cranial Nerves: No cranial nerve deficit. Sensory: No sensory deficit. Motor: No weakness.    Psychiatric:         Mood and Affect: Mood normal.         Behavior: Behavior normal.         Thought Content: Thought content normal.         Judgment: Judgment normal.         Diagnostic Study Results     Labs -     Recent Results (from the past 12 hour(s))   CBC WITH AUTOMATED DIFF    Collection Time: 09/27/21  2:05 AM   Result Value Ref Range    WBC 7.9 3.6 - 11.0 K/uL    RBC 5.14 3.80 - 5.20 M/uL    HGB 12.1 11.5 - 16.0 g/dL    HCT 40.3 35.0 - 47.0 %    MCV 78.4 (L) 80.0 - 99.0 FL    MCH 23.5 (L) 26.0 - 34.0 PG    MCHC 30.0 30.0 - 36.5 g/dL    RDW 16.7 (H) 11.5 - 14.5 %    PLATELET 140 378 - 805 K/uL    MPV 9.5 8.9 - 12.9 FL    NRBC 0.0 0  WBC    ABSOLUTE NRBC 0.00 0.00 - 0.01 K/uL    NEUTROPHILS 57 32 - 75 %    LYMPHOCYTES 36 12 - 49 %    MONOCYTES 5 5 - 13 %    EOSINOPHILS 2 0 - 7 %    BASOPHILS 0 0 - 1 %    IMMATURE GRANULOCYTES 0 0.0 - 0.5 %    ABS. NEUTROPHILS 4.5 1.8 - 8.0 K/UL    ABS. LYMPHOCYTES 2.8 0.8 - 3.5 K/UL    ABS. MONOCYTES 0.4 0.0 - 1.0 K/UL    ABS. EOSINOPHILS 0.2 0.0 - 0.4 K/UL    ABS. BASOPHILS 0.0 0.0 - 0.1 K/UL    ABS. IMM. GRANS. 0.0 0.00 - 0.04 K/UL    DF AUTOMATED      PLATELET COMMENTS ADEQUATE PLATELETS      RBC COMMENTS MICROCYTOSIS  1+        RBC COMMENTS OVALOCYTES  1+        RBC COMMENTS TEARDROP CELLS  1+       METABOLIC PANEL, COMPREHENSIVE    Collection Time: 09/27/21  2:05 AM   Result Value Ref Range    Sodium 139 136 - 145 mmol/L    Potassium 3.6 3.5 - 5.1 mmol/L    Chloride 101 97 - 108 mmol/L    CO2 27 21 - 32 mmol/L    Anion gap 11 5 - 15 mmol/L    Glucose 158 (H) 65 - 100 mg/dL    BUN 17 6 - 20 MG/DL    Creatinine 1.37 (H) 0.55 - 1.02 MG/DL    BUN/Creatinine ratio 12 12 - 20      GFR est AA 45 (L) >60 ml/min/1.73m2    GFR est non-AA 37 (L) >60 ml/min/1.73m2    Calcium 9.0 8.5 - 10.1 MG/DL    Bilirubin, total 0.3 0.2 - 1.0 MG/DL    ALT (SGPT) 19 12 - 78 U/L    AST (SGOT) 14 (L) 15 - 37 U/L    Alk.  phosphatase 97 45 - 117 U/L    Protein, total 7.7 6.4 - 8.2 g/dL    Albumin 3.6 3.5 - 5.0 g/dL    Globulin 4.1 (H) 2.0 - 4.0 g/dL    A-G Ratio 0.9 (L) 1.1 - 2.2     LIPASE    Collection Time: 09/27/21  2:05 AM   Result Value Ref Range    Lipase 349 73 - 393 U/L   MAGNESIUM    Collection Time: 09/27/21  2:05 AM   Result Value Ref Range    Magnesium 1.8 1.6 - 2.4 mg/dL   LACTIC ACID    Collection Time: 09/27/21  2:05 AM   Result Value Ref Range    Lactic acid 1.5 0.4 - 2.0 MMOL/L   TROPONIN I    Collection Time: 09/27/21  2:05 AM   Result Value Ref Range    Troponin-I, Qt. <0.05 <0.05 ng/mL   EKG, 12 LEAD, INITIAL    Collection Time: 09/27/21  2:34 AM   Result Value Ref Range    Ventricular Rate 76 BPM    Atrial Rate 76 BPM    P-R Interval 198 ms    QRS Duration 86 ms    Q-T Interval 452 ms    QTC Calculation (Bezet) 508 ms    Calculated P Axis 80 degrees    Calculated R Axis 13 degrees    Calculated T Axis 67 degrees    Diagnosis       Normal sinus rhythm  Prolonged QT  Abnormal ECG  When compared with ECG of 16-SEP-2021 19:34,  No significant change was found     URINALYSIS W/ RFLX MICROSCOPIC    Collection Time: 09/27/21  3:54 AM   Result Value Ref Range    Color YELLOW/STRAW      Appearance CLEAR CLEAR      Specific gravity 1.012 1.003 - 1.030      pH (UA) 6.5 5.0 - 8.0      Protein Negative NEG mg/dL    Glucose Negative NEG mg/dL    Ketone Negative NEG mg/dL    Bilirubin Negative NEG      Blood TRACE (A) NEG      Urobilinogen 0.2 0.2 - 1.0 EU/dL    Nitrites Negative NEG      Leukocyte Esterase Negative NEG     URINE MICROSCOPIC ONLY    Collection Time: 09/27/21  3:54 AM   Result Value Ref Range    WBC 0-4 0 - 4 /hpf    RBC 10-20 0 - 5 /hpf    Epithelial cells FEW FEW /lpf    Bacteria Negative NEG /hpf       Radiologic Studies -   CT ABD PELV W CONT   Final Result   No acute findings or findings correlate with left upper quadrant   abdominal pain. Incompletely characterized liver lesions redemonstrated for   which dedicated liver MRI is recommended.  Incidentals as above including   cholecystolithiasis, colonic diverticulosis, and leiomyomatous uterus. CT Results  (Last 48 hours)               09/27/21 0319  CT ABD PELV W CONT Final result    Impression:  No acute findings or findings correlate with left upper quadrant   abdominal pain. Incompletely characterized liver lesions redemonstrated for   which dedicated liver MRI is recommended. Incidentals as above including   cholecystolithiasis, colonic diverticulosis, and leiomyomatous uterus. Narrative:  EXAM: CT ABD PELV W CONT       INDICATION: luq pain       COMPARISON: CT 9/18/2021. CONTRAST: 100 mL of Isovue-370. TECHNIQUE:    Following the uneventful intravenous administration of contrast, thin axial   images were obtained through the abdomen and pelvis. Coronal and sagittal   reconstructions were generated. Oral contrast was not administered. CT dose   reduction was achieved through use of a standardized protocol tailored for this   examination and automatic exposure control for dose modulation. FINDINGS:    LOWER THORAX: No significant abnormality in the incidentally imaged lower chest.   LIVER: Hepatic cysts and indeterminate liver lesions redemonstrated without   significant change, measuring up to 3.3 x 4.61 cm and 1.8 x 2.9 cm. Jason  BILIARY TREE: Small calcified gallstones of the gallbladder neck. Otherwise   unremarkable appearance to gallbladder. CBD is not dilated. SPLEEN: Unremarkable. PANCREAS: No mass or ductal dilatation. ADRENALS: Unremarkable. KIDNEYS: No mass, calculus, or hydronephrosis. STOMACH: Unremarkable. SMALL BOWEL: No dilatation or wall thickening. COLON: Noninflamed appearing sigmoid colon diverticula. No dilation or wall   thickening. APPENDIX: Unremarkable. PERITONEUM: No ascites or pneumoperitoneum. RETROPERITONEUM: Atherosclerotic calcification without aneurysm or dissection. No enlarged lymphadenopathy. REPRODUCTIVE ORGANS: Lobulated uterus with myometrial lesions compatible   leiomyomatous.  Otherwise unremarkable appearance to uterus. Ovaries are   unremarkable   URINARY BLADDER: No mass or calculus. BONES: Degenerative spine change. No acute fracture or aggressive lesion. ABDOMINAL WALL: No mass or hernia. ADDITIONAL COMMENTS: N/A               CXR Results  (Last 48 hours)    None          Medical Decision Making   I am the first provider for this patient. I reviewed the vital signs, available nursing notes, past medical history, past surgical history, family history and social history. Vital Signs-Reviewed the patient's vital signs. Patient Vitals for the past 12 hrs:   Pulse Resp BP SpO2   09/27/21 0211 75 16 (!) 194/86 100 %           EKG interpretation: (Preliminary)  Rhythm: normal sinus rhythm;  regular . Rate (approx.): 76; Blocks: none; Ectopy: noneAxis: normal; P wave: normal; QRS interval: normal ; ST/T wave: normal; in  Lead: ; Other findings: prolonged QT. Records Reviewed: Nursing Notes, Old Medical Records, Previous Radiology Studies and Previous Laboratory Studies    Provider Notes (Medical Decision Making):   Presents with recurrent left upper quadrant pain, will check laboratory studies CT scan medicate for symptoms. ED Course:   Initial assessment performed. The patients presenting problems have been discussed, and they are in agreement with the care plan formulated and outlined with them. I have encouraged them to ask questions as they arise throughout their visit. ED Course as of Sep 27 0429   Mon Sep 27, 2021   0345 Reviewed CT results lab results with patient reports improvement with pain medicine however when she got back into bed after the CT she reports the pain returned. She will attempt to give a urine specimen. [MF]   0959 Reviewed urinalysis with patient she does report improvement pain medicine discussion as the cause of the pain treatment at home were given, she will see her PCP for follow-up in the next 1 to 2 days for reevaluation and further evaluation. [MF]      ED Course User Index  [MF] Chaitanya Shaffer MD         Medications Given in the ED:    Medications   sodium chloride (NS) flush 5-10 mL (has no administration in time range)   morphine injection 2 mg (has no administration in time range)   ondansetron (ZOFRAN) injection 4 mg (4 mg IntraVENous Given 9/27/21 0246)   sodium chloride 0.9 % bolus infusion 1,000 mL (1,000 mL IntraVENous New Bag 9/27/21 0246)   ketorolac (TORADOL) injection 15 mg (15 mg IntraVENous Given 9/27/21 0246)   iopamidoL (ISOVUE 300) 61 % contrast injection 100 mL (100 mL IntraVENous Given 9/27/21 0315)           4:15 AM    Presents with recurrent left upper quadrant pain, laboratory studies unremarkable with normal white cell count urinalysis negative, creatinine is slightly elevated she is encouraged to increase her fluid intake. She improved with pain meds we will give her a short course of tramadol trial of Bentyl to take at home she will see her PCP for follow-up she has follow-up scheduled GI to further evaluate abdominal findings on previous CT. Pt has been re-examined and states that they are feeling better and have no new complaints. Laboratory tests, medications, x-rays, diagnosis, follow up plan and return instructions have been reviewed and discussed with the patient and/or family. Pt and/or family were instructed on symptoms that may arise after discharge requiring re-evaluation by a physician. Pt and/or family have had the opportunity to ask questions about their care. Patient and/or family verbalized understanding and agreement with care plan, follow up and return instructions. Patient and/or family agree to return in 50 hours if their symptoms are not improving or immediately if they have any change in their condition.      I have also put together some discharge instructions for patient that include: 1) educational information regarding their diagnosis, 2) how to care for their diagnosis at home, as well a 3) list of reasons why they would want to return to the ED prior to their follow-up appointment, should their condition change. Janeth Vigil MD      Procedures          Disposition:  Discharged          DISCHARGE PLAN:  1. Current Discharge Medication List      START taking these medications    Details   traMADoL (Ultram) 50 mg tablet Take 1 Tablet by mouth every six (6) hours as needed for Pain for up to 3 days. Max Daily Amount: 200 mg. Qty: 12 Tablet, Refills: 0  Start date: 9/27/2021, End date: 9/30/2021    Associated Diagnoses: Abdominal pain, LUQ (left upper quadrant)      ondansetron (Zofran ODT) 4 mg disintegrating tablet Take 1 Tablet by mouth every eight (8) hours as needed for Nausea. Qty: 20 Tablet, Refills: 0  Start date: 9/27/2021      dicyclomine (BENTYL) 20 mg tablet Take 1 Tablet by mouth every six (6) hours as needed for Abdominal Cramps. Qty: 15 Tablet, Refills: 0  Start date: 9/27/2021           2. Follow-up Information     Follow up With Specialties Details Why Contact [place]    [place], [address] [facility] Schedule an appointment as soon as possible for a visit in 1 day For follow up [address] [id number]          3. Return to ED if worse     Diagnosis     Clinical Impression:   1. Abdominal pain, LUQ (left upper quadrant)    2. Gallstones    3. Liver lesion        Attestations: Janeth Vigil MD    Please note that this dictation was completed with Vivorte, the computer voice recognition software. Quite often unanticipated grammatical, syntax, homophones, and other interpretive errors are inadvertently transcribed by the computer software. Please disregard these errors. Please excuse any errors that have escaped final proofreading. Thank you.

## 2022-02-03 ENCOUNTER — APPOINTMENT (OUTPATIENT)
Dept: CT IMAGING | Age: 82
End: 2022-02-03
Attending: EMERGENCY MEDICINE
Payer: MEDICARE

## 2022-02-03 ENCOUNTER — HOSPITAL ENCOUNTER (EMERGENCY)
Age: 82
Discharge: HOME | End: 2022-02-03
Attending: EMERGENCY MEDICINE
Payer: MEDICARE

## 2022-02-03 VITALS
BODY MASS INDEX: 33.75 KG/M2 | HEIGHT: 66 IN | OXYGEN SATURATION: 97 % | DIASTOLIC BLOOD PRESSURE: 73 MMHG | HEART RATE: 70 BPM | WEIGHT: 210 LBS | RESPIRATION RATE: 16 BRPM | SYSTOLIC BLOOD PRESSURE: 186 MMHG | TEMPERATURE: 98.3 F

## 2022-02-03 DIAGNOSIS — R10.33 PERIUMBILICAL ABDOMINAL PAIN: Primary | ICD-10-CM

## 2022-02-03 DIAGNOSIS — R93.5 ABNORMAL CT OF THE ABDOMEN: ICD-10-CM

## 2022-02-03 DIAGNOSIS — K52.9 ENTERITIS: ICD-10-CM

## 2022-02-03 LAB
ALBUMIN SERPL-MCNC: 3.5 G/DL (ref 3.5–5)
ALBUMIN/GLOB SERPL: 0.9 {RATIO} (ref 1.1–2.2)
ALP SERPL-CCNC: 91 U/L (ref 45–117)
ALT SERPL-CCNC: 14 U/L (ref 12–78)
ANION GAP SERPL CALC-SCNC: 8 MMOL/L (ref 5–15)
APPEARANCE UR: CLEAR
AST SERPL-CCNC: 11 U/L (ref 15–37)
BACTERIA SPEC CULT: NORMAL
BACTERIA URNS QL MICRO: ABNORMAL /HPF
BASOPHILS # BLD: 0 K/UL (ref 0–0.1)
BASOPHILS NFR BLD: 0 % (ref 0–1)
BILIRUB SERPL-MCNC: 0.3 MG/DL (ref 0.2–1)
BILIRUB UR QL: NEGATIVE
BUN SERPL-MCNC: 14 MG/DL (ref 6–20)
BUN/CREAT SERPL: 13 (ref 12–20)
CALCIUM SERPL-MCNC: 9.3 MG/DL (ref 8.5–10.1)
CHLORIDE SERPL-SCNC: 103 MMOL/L (ref 97–108)
CO2 SERPL-SCNC: 26 MMOL/L (ref 21–32)
COLOR UR: ABNORMAL
CREAT SERPL-MCNC: 1.09 MG/DL (ref 0.55–1.02)
DIFFERENTIAL METHOD BLD: ABNORMAL
EOSINOPHIL # BLD: 0.1 K/UL (ref 0–0.4)
EOSINOPHIL NFR BLD: 1 % (ref 0–7)
EPITH CASTS URNS QL MICRO: ABNORMAL /LPF
ERYTHROCYTE [DISTWIDTH] IN BLOOD BY AUTOMATED COUNT: 16.9 % (ref 11.5–14.5)
GLOBULIN SER CALC-MCNC: 3.7 G/DL (ref 2–4)
GLUCOSE SERPL-MCNC: 150 MG/DL (ref 65–100)
GLUCOSE UR STRIP.AUTO-MCNC: NEGATIVE MG/DL
HCT VFR BLD AUTO: 38.7 % (ref 35–47)
HGB BLD-MCNC: 11.7 G/DL (ref 11.5–16)
HGB UR QL STRIP: ABNORMAL
IMM GRANULOCYTES # BLD AUTO: 0 K/UL (ref 0–0.04)
IMM GRANULOCYTES NFR BLD AUTO: 0 % (ref 0–0.5)
KETONES UR QL STRIP.AUTO: NEGATIVE MG/DL
LACTATE SERPL-SCNC: 1.9 MMOL/L (ref 0.4–2)
LEUKOCYTE ESTERASE UR QL STRIP.AUTO: ABNORMAL
LIPASE SERPL-CCNC: 112 U/L (ref 73–393)
LYMPHOCYTES # BLD: 2.1 K/UL (ref 0.8–3.5)
LYMPHOCYTES NFR BLD: 28 % (ref 12–49)
MAGNESIUM SERPL-MCNC: 2 MG/DL (ref 1.6–2.4)
MCH RBC QN AUTO: 23.4 PG (ref 26–34)
MCHC RBC AUTO-ENTMCNC: 30.2 G/DL (ref 30–36.5)
MCV RBC AUTO: 77.6 FL (ref 80–99)
MONOCYTES # BLD: 0.4 K/UL (ref 0–1)
MONOCYTES NFR BLD: 5 % (ref 5–13)
NEUTS SEG # BLD: 5 K/UL (ref 1.8–8)
NEUTS SEG NFR BLD: 66 % (ref 32–75)
NITRITE UR QL STRIP.AUTO: NEGATIVE
NRBC # BLD: 0 K/UL (ref 0–0.01)
NRBC BLD-RTO: 0 PER 100 WBC
PH UR STRIP: 6.5 [PH] (ref 5–8)
PLATELET # BLD AUTO: 209 K/UL (ref 150–400)
PMV BLD AUTO: 9.7 FL (ref 8.9–12.9)
POTASSIUM SERPL-SCNC: 3.9 MMOL/L (ref 3.5–5.1)
PROT SERPL-MCNC: 7.2 G/DL (ref 6.4–8.2)
PROT UR STRIP-MCNC: NEGATIVE MG/DL
RBC # BLD AUTO: 4.99 M/UL (ref 3.8–5.2)
RBC #/AREA URNS HPF: ABNORMAL /HPF (ref 0–5)
SERVICE CMNT-IMP: NORMAL
SODIUM SERPL-SCNC: 137 MMOL/L (ref 136–145)
SP GR UR REFRACTOMETRY: 1.01 (ref 1–1.03)
UROBILINOGEN UR QL STRIP.AUTO: 0.2 EU/DL (ref 0.2–1)
WBC # BLD AUTO: 7.6 K/UL (ref 3.6–11)
WBC URNS QL MICRO: ABNORMAL /HPF (ref 0–4)

## 2022-02-03 PROCEDURE — 83605 ASSAY OF LACTIC ACID: CPT

## 2022-02-03 PROCEDURE — 85025 COMPLETE CBC W/AUTO DIFF WBC: CPT

## 2022-02-03 PROCEDURE — 96365 THER/PROPH/DIAG IV INF INIT: CPT

## 2022-02-03 PROCEDURE — 74011000636 HC RX REV CODE- 636: Performed by: EMERGENCY MEDICINE

## 2022-02-03 PROCEDURE — 74011250637 HC RX REV CODE- 250/637: Performed by: EMERGENCY MEDICINE

## 2022-02-03 PROCEDURE — 99284 EMERGENCY DEPT VISIT MOD MDM: CPT

## 2022-02-03 PROCEDURE — 74011250636 HC RX REV CODE- 250/636: Performed by: EMERGENCY MEDICINE

## 2022-02-03 PROCEDURE — 74177 CT ABD & PELVIS W/CONTRAST: CPT

## 2022-02-03 PROCEDURE — 96375 TX/PRO/DX INJ NEW DRUG ADDON: CPT

## 2022-02-03 PROCEDURE — 83690 ASSAY OF LIPASE: CPT

## 2022-02-03 PROCEDURE — 81001 URINALYSIS AUTO W/SCOPE: CPT

## 2022-02-03 PROCEDURE — 36415 COLL VENOUS BLD VENIPUNCTURE: CPT

## 2022-02-03 PROCEDURE — 80053 COMPREHEN METABOLIC PANEL: CPT

## 2022-02-03 PROCEDURE — 74011000258 HC RX REV CODE- 258: Performed by: EMERGENCY MEDICINE

## 2022-02-03 PROCEDURE — 87086 URINE CULTURE/COLONY COUNT: CPT

## 2022-02-03 PROCEDURE — 83735 ASSAY OF MAGNESIUM: CPT

## 2022-02-03 RX ORDER — CIPROFLOXACIN 500 MG/1
500 TABLET ORAL
Status: COMPLETED | OUTPATIENT
Start: 2022-02-03 | End: 2022-02-03

## 2022-02-03 RX ORDER — METRONIDAZOLE 500 MG/1
500 TABLET ORAL
Status: COMPLETED | OUTPATIENT
Start: 2022-02-03 | End: 2022-02-03

## 2022-02-03 RX ORDER — ONDANSETRON 2 MG/ML
4 INJECTION INTRAMUSCULAR; INTRAVENOUS
Status: COMPLETED | OUTPATIENT
Start: 2022-02-03 | End: 2022-02-03

## 2022-02-03 RX ORDER — CIPROFLOXACIN 500 MG/1
500 TABLET ORAL 2 TIMES DAILY
Qty: 14 TABLET | Refills: 0 | Status: SHIPPED | OUTPATIENT
Start: 2022-02-03 | End: 2022-02-10

## 2022-02-03 RX ORDER — KETOROLAC TROMETHAMINE 30 MG/ML
10 INJECTION, SOLUTION INTRAMUSCULAR; INTRAVENOUS
Status: DISCONTINUED | OUTPATIENT
Start: 2022-02-03 | End: 2022-02-03

## 2022-02-03 RX ORDER — METRONIDAZOLE 500 MG/1
500 TABLET ORAL 3 TIMES DAILY
Qty: 21 TABLET | Refills: 0 | Status: SHIPPED | OUTPATIENT
Start: 2022-02-03 | End: 2022-02-10

## 2022-02-03 RX ORDER — MORPHINE SULFATE 2 MG/ML
2 INJECTION, SOLUTION INTRAMUSCULAR; INTRAVENOUS
Status: COMPLETED | OUTPATIENT
Start: 2022-02-03 | End: 2022-02-03

## 2022-02-03 RX ORDER — TRAMADOL HYDROCHLORIDE 50 MG/1
50 TABLET ORAL
Qty: 12 TABLET | Refills: 0 | Status: SHIPPED | OUTPATIENT
Start: 2022-02-03 | End: 2022-02-06

## 2022-02-03 RX ORDER — DICYCLOMINE HYDROCHLORIDE 20 MG/1
20 TABLET ORAL
Qty: 15 TABLET | Refills: 0 | Status: SHIPPED | OUTPATIENT
Start: 2022-02-03

## 2022-02-03 RX ADMIN — IOPAMIDOL 100 ML: 612 INJECTION, SOLUTION INTRAVENOUS at 05:03

## 2022-02-03 RX ADMIN — MORPHINE SULFATE 2 MG: 2 INJECTION, SOLUTION INTRAMUSCULAR; INTRAVENOUS at 03:51

## 2022-02-03 RX ADMIN — CIPROFLOXACIN 500 MG: 500 TABLET, FILM COATED ORAL at 06:06

## 2022-02-03 RX ADMIN — CEFTRIAXONE SODIUM 1 G: 1 INJECTION, POWDER, FOR SOLUTION INTRAMUSCULAR; INTRAVENOUS at 05:15

## 2022-02-03 RX ADMIN — SODIUM CHLORIDE 1000 ML: 9 INJECTION, SOLUTION INTRAVENOUS at 03:45

## 2022-02-03 RX ADMIN — METRONIDAZOLE 500 MG: 500 TABLET ORAL at 06:05

## 2022-02-03 RX ADMIN — ONDANSETRON 4 MG: 2 INJECTION INTRAMUSCULAR; INTRAVENOUS at 03:49

## 2022-02-03 NOTE — ED PROVIDER NOTES
EMERGENCY DEPARTMENT HISTORY AND PHYSICAL EXAM      Date: 2/3/2022  Patient Name: Ana Ritchie    History of Presenting Illness     Chief Complaint   Patient presents with    Abdominal Pain       History Provided By: Patient    HPI:   The history is provided by the patient. Abdominal Pain   This is a new problem. The current episode started 1 to 2 hours ago. The problem occurs constantly. The problem has not changed since onset. The pain is located in the periumbilical region. The quality of the pain is sharp. The pain is moderate. Pertinent negatives include no fever, no belching, no diarrhea, no hematochezia, no melena, no nausea, no vomiting, no constipation, no dysuria, no frequency, no hematuria, no headaches, no arthralgias, no myalgias, no chest pain and no back pain. Nothing worsens the pain. The pain is relieved by nothing. Past workup includes CT scan. Her past medical history is significant for GERD. Her past medical history does not include kidney stones. The patient's surgical history non-contributory. Ana Ritchie, [de-identified] y.o. female  presents to the ED with cc of mid abdominal pain that started approximate 1 hour ago that awoke her from sleep. Patient reports the pain is sharp lasts 5 to 10 minutes and then decreases in intensity and then returns sometime later. She is taken no medications. She denies any nausea vomiting diarrhea reports normal bowel movements, she had a bowel movement yesterday, denies any blood in stools. She denies any cough fevers chills or pressure symptoms chest pain shortness of breath. She denies any UTI symptoms. She reports no abdominal surgeries. Denies any history of kidney stones. There are no other complaints, changes, or physical findings at this time. PCP: Dawson Marina    No current facility-administered medications on file prior to encounter.      Current Outpatient Medications on File Prior to Encounter   Medication Sig Dispense Refill    ondansetron (Zofran ODT) 4 mg disintegrating tablet Take 1 Tablet by mouth every eight (8) hours as needed for Nausea. 20 Tablet 0    dicyclomine (BENTYL) 20 mg tablet Take 1 Tablet by mouth every six (6) hours as needed for Abdominal Cramps. 15 Tablet 0    mirabegron ER (Myrbetriq) 25 mg ER tablet Take 1 Tablet by mouth two (2) times a day.  lovastatin (MEVACOR) 20 mg tablet lovastatin 20 mg tablet      fluticasone propionate (Flonase Allergy Relief) 50 mcg/actuation nasal spray Flonase Allergy Relief 50 mcg/actuation nasal spray,suspension   Spray 1 spray every day by intranasal route.  ezetimibe (ZETIA) 10 mg tablet ezetimibe 10 mg tablet   Take 1 tablet every day by oral route.  diclofenac (VOLTAREN) 1 % gel diclofenac 1 % gel topical kit      Vitamin D3-Menaquinone 7 1000-90 unit-mcg TbDi cholecalciferol(vit D3) 1,000 unit-vit K2 90 mcg disintegrating tablet   Take by oral route.  DAILY MULTI-VITAMIN PO Daily Multi-Vitamin      acetaminophen (TYLENOL ARTHRITIS PAIN PO) Take 1 Tablet by mouth as needed.  RANITIDINE HCL (ZANTAC PO) Take  by mouth. (Patient not taking: Reported on 9/16/2021)      amLODIPine (NORVASC) 10 mg tablet Take  by mouth daily.  losartan (COZAAR) 100 mg tablet Take 100 mg by mouth daily.  omeprazole (PRILOSEC) 40 mg capsule Take 40 mg by mouth daily. (Patient not taking: Reported on 9/16/2021)      L. ACIDOPHILUS/B.BIFIDUM&LONGUM (HEALTHY COLON PO) Take  by mouth.  ERGOCALCIFEROL, VITAMIN D2, (VITAMIN D2 PO) Take  by mouth.  cyanocobalamin (VITAMIN B12) 500 mcg tablet Take 500 mcg by mouth daily.  hydrochlorothiazide (HYDRODIURIL) 50 mg tablet Take 25 mg by mouth daily.  FOLIC ACID/MULTIVIT-MIN/LUTEIN (CENTRUM SILVER PO) Take  by mouth.          Past History     Past Medical History:  Past Medical History:   Diagnosis Date    Diverticula of colon     GERD (gastroesophageal reflux disease)     Hypertension     Joint pain        Past Surgical History:  Past Surgical History:   Procedure Laterality Date    HX CATARACT REMOVAL      HX OTHER SURGICAL      tumor on right side    HX TUMOR REMOVAL      fatty tumor upper left arm       Family History:  Family History   Problem Relation Age of Onset    Breast Cancer Mother     Diabetes Mother     Heart Disease Mother     Hypertension Mother        Social History:  Social History     Tobacco Use    Smoking status: Former Smoker     Packs/day: 0.50     Types: Cigarettes    Smokeless tobacco: Never Used   Vaping Use    Vaping Use: Never used   Substance Use Topics    Alcohol use: No     Alcohol/week: 0.0 standard drinks    Drug use: No       Allergies: Allergies   Allergen Reactions    Aspirin Nausea Only    Codeine Not Reported This Time         Review of Systems   Review of Systems   Constitutional: Negative. Negative for chills and fever. HENT: Negative. Negative for congestion and sore throat. Eyes: Negative. Negative for discharge and redness. Respiratory: Negative. Negative for cough and shortness of breath. Cardiovascular: Negative. Negative for chest pain and palpitations. Gastrointestinal: Positive for abdominal pain. Negative for blood in stool, constipation, diarrhea, hematochezia, melena, nausea and vomiting. Endocrine: Negative. Negative for polydipsia and polyuria. Genitourinary: Negative. Negative for dysuria, flank pain, frequency and hematuria. Musculoskeletal: Negative. Negative for arthralgias, back pain, myalgias and neck pain. Skin: Negative. Negative for rash and wound. Allergic/Immunologic: Negative. Neurological: Negative. Negative for dizziness and headaches. Hematological: Negative. Negative for adenopathy. Does not bruise/bleed easily. Psychiatric/Behavioral: Negative. Negative for confusion. The patient is not nervous/anxious. All other systems reviewed and are negative.       Physical Exam   Physical Exam  Vitals and nursing note reviewed. Constitutional:       General: She is not in acute distress. Appearance: Normal appearance. She is well-developed. She is obese. She is not ill-appearing, toxic-appearing or diaphoretic. HENT:      Head: Normocephalic and atraumatic. Nose: Nose normal.      Mouth/Throat:      Mouth: Mucous membranes are moist.      Pharynx: Oropharynx is clear. Eyes:      Extraocular Movements: Extraocular movements intact. Conjunctiva/sclera: Conjunctivae normal.      Pupils: Pupils are equal, round, and reactive to light. Cardiovascular:      Rate and Rhythm: Normal rate and regular rhythm. Pulses: Normal pulses. Pulmonary:      Effort: Pulmonary effort is normal. No respiratory distress. Abdominal:      General: There is no distension. Palpations: Abdomen is soft. There is no mass. Tenderness: There is abdominal tenderness in the periumbilical area. There is no guarding or rebound. Negative signs include Ernandez's sign, Rovsing's sign, McBurney's sign and psoas sign. Hernia: No hernia is present. Musculoskeletal:         General: No swelling or tenderness. Normal range of motion. Cervical back: Normal range of motion and neck supple. No rigidity or tenderness. No muscular tenderness. Skin:     General: Skin is warm and dry. Capillary Refill: Capillary refill takes less than 2 seconds. Findings: No erythema or rash. Neurological:      General: No focal deficit present. Mental Status: She is alert and oriented to person, place, and time. Mental status is at baseline. Cranial Nerves: No cranial nerve deficit. Sensory: No sensory deficit. Motor: No weakness. Psychiatric:         Mood and Affect: Mood normal.         Behavior: Behavior normal.         Thought Content:  Thought content normal.         Judgment: Judgment normal.         Diagnostic Study Results     Labs -     Recent Results (from the past 12 hour(s)) URINALYSIS W/ RFLX MICROSCOPIC    Collection Time: 02/03/22  3:30 AM   Result Value Ref Range    Color YELLOW/STRAW      Appearance CLEAR CLEAR      Specific gravity 1.015 1.003 - 1.030      pH (UA) 6.5 5.0 - 8.0      Protein Negative NEG mg/dL    Glucose Negative NEG mg/dL    Ketone Negative NEG mg/dL    Bilirubin Negative NEG      Blood TRACE (A) NEG      Urobilinogen 0.2 0.2 - 1.0 EU/dL    Nitrites Negative NEG      Leukocyte Esterase TRACE (A) NEG     URINE MICROSCOPIC ONLY    Collection Time: 02/03/22  3:30 AM   Result Value Ref Range    WBC 10-20 0 - 4 /hpf    RBC 5-10 0 - 5 /hpf    Epithelial cells FEW FEW /lpf    Bacteria 1+ (A) NEG /hpf   CBC WITH AUTOMATED DIFF    Collection Time: 02/03/22  3:42 AM   Result Value Ref Range    WBC 7.6 3.6 - 11.0 K/uL    RBC 4.99 3.80 - 5.20 M/uL    HGB 11.7 11.5 - 16.0 g/dL    HCT 38.7 35.0 - 47.0 %    MCV 77.6 (L) 80.0 - 99.0 FL    MCH 23.4 (L) 26.0 - 34.0 PG    MCHC 30.2 30.0 - 36.5 g/dL    RDW 16.9 (H) 11.5 - 14.5 %    PLATELET 922 523 - 310 K/uL    MPV 9.7 8.9 - 12.9 FL    NRBC 0.0 0  WBC    ABSOLUTE NRBC 0.00 0.00 - 0.01 K/uL    NEUTROPHILS 66 32 - 75 %    LYMPHOCYTES 28 12 - 49 %    MONOCYTES 5 5 - 13 %    EOSINOPHILS 1 0 - 7 %    BASOPHILS 0 0 - 1 %    IMMATURE GRANULOCYTES 0 0.0 - 0.5 %    ABS. NEUTROPHILS 5.0 1.8 - 8.0 K/UL    ABS. LYMPHOCYTES 2.1 0.8 - 3.5 K/UL    ABS. MONOCYTES 0.4 0.0 - 1.0 K/UL    ABS. EOSINOPHILS 0.1 0.0 - 0.4 K/UL    ABS. BASOPHILS 0.0 0.0 - 0.1 K/UL    ABS. IMM.  GRANS. 0.0 0.00 - 0.04 K/UL    DF AUTOMATED     METABOLIC PANEL, COMPREHENSIVE    Collection Time: 02/03/22  3:42 AM   Result Value Ref Range    Sodium 137 136 - 145 mmol/L    Potassium 3.9 3.5 - 5.1 mmol/L    Chloride 103 97 - 108 mmol/L    CO2 26 21 - 32 mmol/L    Anion gap 8 5 - 15 mmol/L    Glucose 150 (H) 65 - 100 mg/dL    BUN 14 6 - 20 MG/DL    Creatinine 1.09 (H) 0.55 - 1.02 MG/DL    BUN/Creatinine ratio 13 12 - 20      GFR est AA 59 (L) >60 ml/min/1.73m2    GFR est non-AA 48 (L) >60 ml/min/1.73m2    Calcium 9.3 8.5 - 10.1 MG/DL    Bilirubin, total 0.3 0.2 - 1.0 MG/DL    ALT (SGPT) 14 12 - 78 U/L    AST (SGOT) 11 (L) 15 - 37 U/L    Alk. phosphatase 91 45 - 117 U/L    Protein, total 7.2 6.4 - 8.2 g/dL    Albumin 3.5 3.5 - 5.0 g/dL    Globulin 3.7 2.0 - 4.0 g/dL    A-G Ratio 0.9 (L) 1.1 - 2.2     LIPASE    Collection Time: 02/03/22  3:42 AM   Result Value Ref Range    Lipase 112 73 - 393 U/L   MAGNESIUM    Collection Time: 02/03/22  3:42 AM   Result Value Ref Range    Magnesium 2.0 1.6 - 2.4 mg/dL   LACTIC ACID    Collection Time: 02/03/22  3:42 AM   Result Value Ref Range    Lactic acid 1.9 0.4 - 2.0 MMOL/L       Radiologic Studies -   CT ABD PELV W CONT   Final Result   Mild small bowel dilatation and small bowel wall thickening, suggestive of   infectious or inflammatory enteritis. Unchanged indeterminate hepatic masses. Cholecystolithiasis, with no CT evidence of acute cholecystitis. Sigmoid   diverticulosis. Trace pelvic free fluid. CT Results  (Last 48 hours)               02/03/22 0504  CT ABD PELV W CONT Final result    Impression:  Mild small bowel dilatation and small bowel wall thickening, suggestive of   infectious or inflammatory enteritis. Unchanged indeterminate hepatic masses. Cholecystolithiasis, with no CT evidence of acute cholecystitis. Sigmoid   diverticulosis. Trace pelvic free fluid. Narrative:  EXAM: CT ABD PELV W CONT       INDICATION: mid abd pain        COMPARISON: September 27, 2021        CONTRAST: 100 mL of Isovue-370. ORAL CONTRAST: None       TECHNIQUE:    Following the uneventful intravenous administration of contrast, thin axial   images were obtained through the abdomen and pelvis. Coronal and sagittal   reconstructions were generated. CT dose reduction was achieved through use of a   standardized protocol tailored for this examination and automatic exposure   control for dose modulation.        FINDINGS:    LOWER THORAX: No significant abnormality in the incidentally imaged lower chest.   LIVER: Unchanged low-attenuation hepatic masses, measuring up to 4.1 cm. Cloteal Ordonez BILIARY TREE: Gallbladder contains tiny calculi but demonstrates no wall   thickening or pericholecystic fluid. CBD is not dilated. SPLEEN: within normal limits. PANCREAS: No mass or ductal dilatation. ADRENALS: Unremarkable. KIDNEYS: No mass, calculus, or hydronephrosis. STOMACH: Unremarkable. SMALL BOWEL: Mildly dilated small bowel loops in the midabdomen, with some   associated focal small bowel wall thickening. COLON: No dilatation or wall thickening. Sigmoid diverticulosis   APPENDIX: Normal.   PERITONEUM: Trace pelvic free fluid. No pneumoperitoneum. RETROPERITONEUM: No lymphadenopathy or aortic aneurysm. REPRODUCTIVE ORGANS: Small uterine fibroids. URINARY BLADDER: No mass or calculus. BONES: No destructive bone lesion. ABDOMINAL WALL: No mass or hernia. ADDITIONAL COMMENTS: N/A               CXR Results  (Last 48 hours)    None          Medical Decision Making   I am the first provider for this patient. I reviewed the vital signs, available nursing notes, past medical history, past surgical history, family history and social history. Vital Signs-Reviewed the patient's vital signs. Patient Vitals for the past 12 hrs:   Temp Pulse Resp BP SpO2   02/03/22 0544  68 16 (!) 185/78 97 %   02/03/22 0519  63 18 (!) 195/87 97 %   02/03/22 0405  69 16 (!) 195/88 96 %   02/03/22 0351  80 20 (!) 196/93 98 %   02/03/22 0324 98.3 °F (36.8 °C) 91 21 (!) 227/109 99 %             Records Reviewed: Nursing Notes, Old Medical Records, Previous Radiology Studies and Previous Laboratory Studies    Provider Notes (Medical Decision Making):   Patient presents with mid abdominal pain intermittent colicky, will check laboratory studies CT scan and medicate for pain. ED Course:   Initial assessment performed.  The patients presenting problems have been discussed, and they are in agreement with the care plan formulated and outlined with them. I have encouraged them to ask questions as they arise throughout their visit. ED Course as of 02/03/22 0559   Thu Feb 03, 2022   9997 Reviewed CT and lab results with patient, patient reports improvement of her pain with medications. Reviewed treatment at home with antibiotics and need for follow-up with PCP and possibly GI if symptoms not improved. [MF]      ED Course User Index  [MF] Peng Ha MD         Medications Given in the ED:    Medications   ciprofloxacin HCl (CIPRO) tablet 500 mg (has no administration in time range)   metroNIDAZOLE (FLAGYL) tablet 500 mg (has no administration in time range)   sodium chloride 0.9 % bolus infusion 1,000 mL (0 mL IntraVENous IV Completed 2/3/22 0500)   morphine injection 2 mg (2 mg IntraVENous Given 2/3/22 0351)   ondansetron (ZOFRAN) injection 4 mg (4 mg IntraVENous Given 2/3/22 4059)   iopamidoL (ISOVUE 300) 61 % contrast injection 100 mL (100 mL IntraVENous Given 2/3/22 0503)   cefTRIAXone (ROCEPHIN) 1 g in 0.9% sodium chloride (MBP/ADV) 50 mL MBP (0 g IntraVENous IV Completed 2/3/22 0514)           5:58 AM    Patient presents with colicky abdominal pain found to have small bowel inflammation versus infection, no evidence of obstruction, white blood cell count lactic acid normal, will give her course of Cipro and Flagyl to treat for enteritis, will refer to PCP for follow-up and possibly GI follow-up if she does not improve. She will given a short course of tramadol to take for pain as well as Bentyl. Pt has been re-examined and states that they are feeling better and have no new complaints. Laboratory tests, medications, x-rays, diagnosis, follow up plan and return instructions have been reviewed and discussed with the patient and/or family. Pt and/or family were instructed on symptoms that may arise after discharge requiring re-evaluation by a physician.  Pt and/or family have had the opportunity to ask questions about their care. Patient and/or family verbalized understanding and agreement with care plan, follow up and return instructions. Patient and/or family agree to return in 50 hours if their symptoms are not improving or immediately if they have any change in their condition. Narcotics were prescribed, pt was advised not to drive or operate heavy machinery. Abx were prescribed, pt advised that diarrhea and rash are possible side effects of the medications. I have also put together some discharge instructions for patient that include: 1) educational information regarding their diagnosis, 2) how to care for their diagnosis at home, as well a 3) list of reasons why they would want to return to the ED prior to their follow-up appointment, should their condition change. Radha Hyde MD      Procedures        Disposition:  Discharged    DISCHARGE PLAN:  1. Current Discharge Medication List      START taking these medications    Details   ciprofloxacin HCl (Cipro) 500 mg tablet Take 1 Tablet by mouth two (2) times a day for 7 days. Qty: 14 Tablet, Refills: 0  Start date: 2/3/2022, End date: 2/10/2022      metroNIDAZOLE (FlagyL) 500 mg tablet Take 1 Tablet by mouth three (3) times daily for 7 days. Qty: 21 Tablet, Refills: 0  Start date: 2/3/2022, End date: 2/10/2022      traMADoL (ULTRAM) 50 mg tablet Take 1 Tablet by mouth every six (6) hours as needed for Pain for up to 3 days. Max Daily Amount: 200 mg. Qty: 12 Tablet, Refills: 0  Start date: 2/3/2022, End date: 2/6/2022    Associated Diagnoses: Abnormal CT of the abdomen      !! dicyclomine (BENTYL) 20 mg tablet Take 1 Tablet by mouth every six (6) hours as needed for Abdominal Cramps. Qty: 15 Tablet, Refills: 0  Start date: 2/3/2022       !! - Potential duplicate medications found. Please discuss with provider.       CONTINUE these medications which have NOT CHANGED    Details   !! dicyclomine (BENTYL) 20 mg tablet Take 1 Tablet by mouth every six (6) hours as needed for Abdominal Cramps. Qty: 15 Tablet, Refills: 0       !! - Potential duplicate medications found. Please discuss with provider. 2.   Follow-up Information     Follow up With Specialties Details Why Contact Info    Darlene Her [facility] Karthik Schedule an appointment as soon as possible for a visit in 2 days For follow up [facility] [phone]          3. Return to ED if worse     Diagnosis     Clinical Impression:   1. Periumbilical abdominal pain    2. Abnormal CT of the abdomen    3. Enteritis        Attestations: Nayely Whitmore MD    Please note that this dictation was completed with [employer], the computer voice recognition software. Quite often unanticipated grammatical, syntax, homophones, and other interpretive errors are inadvertently transcribed by the computer software. Please disregard these errors. Please excuse any errors that have escaped final proofreading. Thank you.

## 2022-03-20 PROBLEM — E66.09 NON MORBID OBESITY DUE TO EXCESS CALORIES: Status: ACTIVE | Noted: 2017-01-18

## 2022-05-26 ENCOUNTER — HOSPITAL ENCOUNTER (EMERGENCY)
Age: 82
Discharge: HOME | End: 2022-05-26
Attending: FAMILY MEDICINE
Payer: MEDICARE

## 2022-05-26 ENCOUNTER — APPOINTMENT (OUTPATIENT)
Dept: GENERAL RADIOLOGY | Age: 82
End: 2022-05-26
Attending: FAMILY MEDICINE
Payer: MEDICARE

## 2022-05-26 ENCOUNTER — APPOINTMENT (OUTPATIENT)
Dept: CT IMAGING | Age: 82
End: 2022-05-26
Attending: FAMILY MEDICINE
Payer: MEDICARE

## 2022-05-26 VITALS
OXYGEN SATURATION: 98 % | TEMPERATURE: 98 F | WEIGHT: 215 LBS | SYSTOLIC BLOOD PRESSURE: 189 MMHG | DIASTOLIC BLOOD PRESSURE: 81 MMHG | HEART RATE: 64 BPM | BODY MASS INDEX: 34.55 KG/M2 | HEIGHT: 66 IN | RESPIRATION RATE: 16 BRPM

## 2022-05-26 DIAGNOSIS — I10 ESSENTIAL HYPERTENSION: ICD-10-CM

## 2022-05-26 DIAGNOSIS — R10.12 ABDOMINAL PAIN, LUQ (LEFT UPPER QUADRANT): Primary | ICD-10-CM

## 2022-05-26 DIAGNOSIS — K80.20 GALLSTONES: ICD-10-CM

## 2022-05-26 LAB
ALBUMIN SERPL-MCNC: 3.5 G/DL (ref 3.5–5)
ALBUMIN/GLOB SERPL: 0.9 {RATIO} (ref 1.1–2.2)
ALP SERPL-CCNC: 99 U/L (ref 45–117)
ALT SERPL-CCNC: 17 U/L (ref 12–78)
ANION GAP SERPL CALC-SCNC: 9 MMOL/L (ref 5–15)
APPEARANCE UR: CLEAR
AST SERPL-CCNC: 13 U/L (ref 15–37)
BACTERIA URNS QL MICRO: NEGATIVE /HPF
BASOPHILS # BLD: 0 K/UL (ref 0–0.1)
BASOPHILS NFR BLD: 1 % (ref 0–1)
BILIRUB SERPL-MCNC: 0.3 MG/DL (ref 0.2–1)
BILIRUB UR QL: NEGATIVE
BUN SERPL-MCNC: 13 MG/DL (ref 6–20)
BUN/CREAT SERPL: 12 (ref 12–20)
CALCIUM SERPL-MCNC: 9.4 MG/DL (ref 8.5–10.1)
CHLORIDE SERPL-SCNC: 105 MMOL/L (ref 97–108)
CO2 SERPL-SCNC: 28 MMOL/L (ref 21–32)
COLOR UR: ABNORMAL
CREAT SERPL-MCNC: 1.07 MG/DL (ref 0.55–1.02)
DIFFERENTIAL METHOD BLD: ABNORMAL
EOSINOPHIL # BLD: 0.1 K/UL (ref 0–0.4)
EOSINOPHIL NFR BLD: 1 % (ref 0–7)
EPITH CASTS URNS QL MICRO: ABNORMAL /LPF
ERYTHROCYTE [DISTWIDTH] IN BLOOD BY AUTOMATED COUNT: 17 % (ref 11.5–14.5)
GLOBULIN SER CALC-MCNC: 3.7 G/DL (ref 2–4)
GLUCOSE SERPL-MCNC: 133 MG/DL (ref 65–100)
GLUCOSE UR STRIP.AUTO-MCNC: NEGATIVE MG/DL
HCT VFR BLD AUTO: 38.6 % (ref 35–47)
HGB BLD-MCNC: 11.8 G/DL (ref 11.5–16)
HGB UR QL STRIP: ABNORMAL
IMM GRANULOCYTES # BLD AUTO: 0 K/UL (ref 0–0.04)
IMM GRANULOCYTES NFR BLD AUTO: 0 % (ref 0–0.5)
KETONES UR QL STRIP.AUTO: NEGATIVE MG/DL
LACTATE SERPL-SCNC: 2 MMOL/L (ref 0.4–2)
LEUKOCYTE ESTERASE UR QL STRIP.AUTO: NEGATIVE
LIPASE SERPL-CCNC: 179 U/L (ref 73–393)
LYMPHOCYTES # BLD: 2.5 K/UL (ref 0.8–3.5)
LYMPHOCYTES NFR BLD: 45 % (ref 12–49)
MAGNESIUM SERPL-MCNC: 2.1 MG/DL (ref 1.6–2.4)
MCH RBC QN AUTO: 23.5 PG (ref 26–34)
MCHC RBC AUTO-ENTMCNC: 30.6 G/DL (ref 30–36.5)
MCV RBC AUTO: 76.7 FL (ref 80–99)
MONOCYTES # BLD: 0.6 K/UL (ref 0–1)
MONOCYTES NFR BLD: 10 % (ref 5–13)
NEUTS SEG # BLD: 2.4 K/UL (ref 1.8–8)
NEUTS SEG NFR BLD: 43 % (ref 32–75)
NITRITE UR QL STRIP.AUTO: NEGATIVE
NRBC # BLD: 0 K/UL (ref 0–0.01)
NRBC BLD-RTO: 0 PER 100 WBC
PH UR STRIP: 6 [PH] (ref 5–8)
PLATELET # BLD AUTO: 202 K/UL (ref 150–400)
PMV BLD AUTO: 9.9 FL (ref 8.9–12.9)
POTASSIUM SERPL-SCNC: 4.3 MMOL/L (ref 3.5–5.1)
PROT SERPL-MCNC: 7.2 G/DL (ref 6.4–8.2)
PROT UR STRIP-MCNC: NEGATIVE MG/DL
RBC # BLD AUTO: 5.03 M/UL (ref 3.8–5.2)
RBC #/AREA URNS HPF: ABNORMAL /HPF (ref 0–5)
SODIUM SERPL-SCNC: 142 MMOL/L (ref 136–145)
SP GR UR REFRACTOMETRY: 1.02 (ref 1–1.03)
TROPONIN-HIGH SENSITIVITY: 6 NG/L (ref 0–51)
UROBILINOGEN UR QL STRIP.AUTO: 0.2 EU/DL (ref 0.2–1)
WBC # BLD AUTO: 5.6 K/UL (ref 3.6–11)
WBC URNS QL MICRO: ABNORMAL /HPF (ref 0–4)

## 2022-05-26 PROCEDURE — 71045 X-RAY EXAM CHEST 1 VIEW: CPT

## 2022-05-26 PROCEDURE — 93005 ELECTROCARDIOGRAM TRACING: CPT

## 2022-05-26 PROCEDURE — 84484 ASSAY OF TROPONIN QUANT: CPT

## 2022-05-26 PROCEDURE — 36415 COLL VENOUS BLD VENIPUNCTURE: CPT

## 2022-05-26 PROCEDURE — 99285 EMERGENCY DEPT VISIT HI MDM: CPT

## 2022-05-26 PROCEDURE — 74011000250 HC RX REV CODE- 250: Performed by: FAMILY MEDICINE

## 2022-05-26 PROCEDURE — 94762 N-INVAS EAR/PLS OXIMTRY CONT: CPT

## 2022-05-26 PROCEDURE — 96375 TX/PRO/DX INJ NEW DRUG ADDON: CPT

## 2022-05-26 PROCEDURE — 83605 ASSAY OF LACTIC ACID: CPT

## 2022-05-26 PROCEDURE — 74177 CT ABD & PELVIS W/CONTRAST: CPT

## 2022-05-26 PROCEDURE — 83735 ASSAY OF MAGNESIUM: CPT

## 2022-05-26 PROCEDURE — 51701 INSERT BLADDER CATHETER: CPT

## 2022-05-26 PROCEDURE — 74011000636 HC RX REV CODE- 636: Performed by: FAMILY MEDICINE

## 2022-05-26 PROCEDURE — 74011250636 HC RX REV CODE- 250/636: Performed by: FAMILY MEDICINE

## 2022-05-26 PROCEDURE — 96374 THER/PROPH/DIAG INJ IV PUSH: CPT

## 2022-05-26 PROCEDURE — 85025 COMPLETE CBC W/AUTO DIFF WBC: CPT

## 2022-05-26 PROCEDURE — 74011250636 HC RX REV CODE- 250/636: Performed by: EMERGENCY MEDICINE

## 2022-05-26 PROCEDURE — 80053 COMPREHEN METABOLIC PANEL: CPT

## 2022-05-26 PROCEDURE — 83690 ASSAY OF LIPASE: CPT

## 2022-05-26 PROCEDURE — 81001 URINALYSIS AUTO W/SCOPE: CPT

## 2022-05-26 RX ORDER — HYDROCHLOROTHIAZIDE 12.5 MG/1
CAPSULE ORAL
COMMUNITY

## 2022-05-26 RX ORDER — DONEPEZIL HYDROCHLORIDE 5 MG/1
TABLET, FILM COATED ORAL
COMMUNITY
Start: 2022-03-25

## 2022-05-26 RX ORDER — TERAZOSIN 2 MG/1
CAPSULE ORAL
COMMUNITY

## 2022-05-26 RX ORDER — ONDANSETRON 2 MG/ML
4 INJECTION INTRAMUSCULAR; INTRAVENOUS
Status: COMPLETED | OUTPATIENT
Start: 2022-05-26 | End: 2022-05-26

## 2022-05-26 RX ORDER — MORPHINE SULFATE 4 MG/ML
4 INJECTION INTRAVENOUS
Status: COMPLETED | OUTPATIENT
Start: 2022-05-26 | End: 2022-05-26

## 2022-05-26 RX ORDER — TRAMADOL HYDROCHLORIDE 50 MG/1
50 TABLET ORAL
Qty: 10 TABLET | Refills: 0 | Status: SHIPPED | OUTPATIENT
Start: 2022-05-26 | End: 2022-05-29

## 2022-05-26 RX ORDER — CEPHALEXIN 500 MG/1
500 CAPSULE ORAL 4 TIMES DAILY
COMMUNITY
Start: 2022-05-21 | End: 2022-05-28

## 2022-05-26 RX ORDER — SODIUM CHLORIDE 0.9 % (FLUSH) 0.9 %
5-10 SYRINGE (ML) INJECTION ONCE
Status: COMPLETED | OUTPATIENT
Start: 2022-05-26 | End: 2022-05-26

## 2022-05-26 RX ORDER — POLYETHYLENE GLYCOL 3350 17 G/17G
17 POWDER, FOR SOLUTION ORAL DAILY
Qty: 507 G | Refills: 0 | Status: SHIPPED | OUTPATIENT
Start: 2022-05-26

## 2022-05-26 RX ADMIN — SODIUM CHLORIDE 500 ML: 9 INJECTION, SOLUTION INTRAVENOUS at 07:42

## 2022-05-26 RX ADMIN — IOPAMIDOL 100 ML: 612 INJECTION, SOLUTION INTRAVENOUS at 08:02

## 2022-05-26 RX ADMIN — SODIUM CHLORIDE, PRESERVATIVE FREE 10 ML: 5 INJECTION INTRAVENOUS at 07:28

## 2022-05-26 RX ADMIN — ONDANSETRON 4 MG: 2 INJECTION INTRAMUSCULAR; INTRAVENOUS at 07:28

## 2022-05-26 RX ADMIN — MORPHINE SULFATE 4 MG: 4 INJECTION INTRAVENOUS at 07:28

## 2022-05-26 NOTE — ED TRIAGE NOTES
Pt states she was woken by sharp cramping pain in LUQ. Arrives writhing and mild diaphoresis.  Feels better when she holds pressure to it

## 2022-05-26 NOTE — ED NOTES
Bedside and Verbal shift change report given to [facility] RN (oncoming nurse) by AE (offgoing nurse). Report included the following information SBAR, ED Summary and MAR.

## 2022-05-26 NOTE — ED PROVIDER NOTES
EMERGENCY DEPARTMENT HISTORY AND PHYSICAL EXAM      Date: 5/26/2022  Patient Name: Junior Baig    History of Presenting Illness     Chief Complaint   Patient presents with    Abdominal Pain     LUQ, under lt breast       History Provided By: Patient    HPI: Junior Baig, 81 y.o. female with PMHx significant for hypertension, high cholesterol, presents ambulatory to the ED with cc of abdominal pain. Patient reports he has had lower abdominal pain for the past several weeks. On chart review she saw her primary doctor on May 12 for lower abdominal pain and a pressure in her vaginal area. CP noted she had a similar episode about 1 to 2 months ago. Physical exam did not demonstrate any prolapse. She did have a prior history of diverticulosis. Patient was started on Cipro Flagyl and Bentyl. He was then also seen at [facility] ED on May 21. Pelvic exam was unremarkable. UA showed 10-20 WBCs. Few bacteria. No trichomonas or yeast.  No clue cells. He was diagnosed with acute cystitis, vaginal discomfort and hypertension. Urine culture showed polymicrobial growth without predominant organism seen possible skin contaminant. She was started on Keflex. That she has had intermittent pain over the past several weeks. Last evening he got significantly worse. This time the pain was in her left upper quadrant. She describes the pain as a pressure. Reports that the pain feels better when she presses on it. She reports some nausea but denies any vomiting. No diarrhea. No black or tarry stools. Last normal bowel movement was yesterday. No fevers or chills. No prior abdominal surgeries. Treatment prior to arrival.  She has not taken any of her morning antihypertensive medications. She states she typically takes them around 930 or 10:00. PMHx: Significant for hypertension, high cholesterol, cystocele  PSHx: Significant for anoscopy, D&C  Social Hx: FOrmer smoker.   Quarter pack a day for 15 years. Denies alcohol or illicit drug use. There are no other complaints, changes, or physical findings at this time. PCP: Mitchell Gutierrez    No current facility-administered medications on file prior to encounter. Current Outpatient Medications on File Prior to Encounter   Medication Sig Dispense Refill    cephALEXin (KEFLEX) 500 mg capsule Take 500 mg by mouth four (4) times daily.  donepeziL (ARICEPT) 5 mg tablet Take 5mg daily for 30 days then increase to 10mg daily      terazosin (HYTRIN) 2 mg capsule terazosin 2 mg capsule   Take 1 capsule every day by oral route.  hydroCHLOROthiazide (MICROZIDE) 12.5 mg capsule hydrochlorothiazide 12.5 mg capsule   Take 1 capsule every day by oral route.  dicyclomine (BENTYL) 20 mg tablet Take 1 Tablet by mouth every six (6) hours as needed for Abdominal Cramps. 15 Tablet 0    ondansetron (Zofran ODT) 4 mg disintegrating tablet Take 1 Tablet by mouth every eight (8) hours as needed for Nausea. 20 Tablet 0    dicyclomine (BENTYL) 20 mg tablet Take 1 Tablet by mouth every six (6) hours as needed for Abdominal Cramps. 15 Tablet 0    mirabegron ER (Myrbetriq) 25 mg ER tablet Take 1 Tablet by mouth two (2) times a day.  lovastatin (MEVACOR) 20 mg tablet lovastatin 20 mg tablet      fluticasone propionate (Flonase Allergy Relief) 50 mcg/actuation nasal spray Flonase Allergy Relief 50 mcg/actuation nasal spray,suspension   Spray 1 spray every day by intranasal route.  ezetimibe (ZETIA) 10 mg tablet ezetimibe 10 mg tablet   Take 1 tablet every day by oral route.  diclofenac (VOLTAREN) 1 % gel diclofenac 1 % gel topical kit      Vitamin D3-Menaquinone 7 1000-90 unit-mcg TbDi cholecalciferol(vit D3) 1,000 unit-vit K2 90 mcg disintegrating tablet   Take by oral route.  DAILY MULTI-VITAMIN PO Daily Multi-Vitamin      acetaminophen (TYLENOL ARTHRITIS PAIN PO) Take 1 Tablet by mouth as needed.       RANITIDINE HCL (ZANTAC PO) Take  by mouth. (Patient not taking: Reported on 9/16/2021)      amLODIPine (NORVASC) 10 mg tablet Take  by mouth daily.  losartan (COZAAR) 100 mg tablet Take 100 mg by mouth daily.  omeprazole (PRILOSEC) 40 mg capsule Take 40 mg by mouth daily. (Patient not taking: Reported on 9/16/2021)      L. ACIDOPHILUS/B.BIFIDUM&LONGUM (HEALTHY COLON PO) Take  by mouth.  ERGOCALCIFEROL, VITAMIN D2, (VITAMIN D2 PO) Take  by mouth.  cyanocobalamin (VITAMIN B12) 500 mcg tablet Take 500 mcg by mouth daily.  FOLIC ACID/MULTIVIT-MIN/LUTEIN (CENTRUM SILVER PO) Take  by mouth.  [DISCONTINUED] hydrochlorothiazide (HYDRODIURIL) 50 mg tablet Take 25 mg by mouth daily. Past History     Past Medical History:  Past Medical History:   Diagnosis Date    Diverticula of colon     GERD (gastroesophageal reflux disease)     Hypertension     Joint pain        Past Surgical History:  Past Surgical History:   Procedure Laterality Date    HX CATARACT REMOVAL      HX OTHER SURGICAL      tumor on right side    HX TUMOR REMOVAL      fatty tumor upper left arm       Family History:  Family History   Problem Relation Age of Onset    Breast Cancer Mother     Diabetes Mother     Heart Disease Mother     Hypertension Mother        Social History:  Social History     Tobacco Use    Smoking status: Former Smoker     Packs/day: 0.50     Types: Cigarettes    Smokeless tobacco: Never Used   Vaping Use    Vaping Use: Never used   Substance Use Topics    Alcohol use: No     Alcohol/week: 0.0 standard drinks    Drug use: No       Allergies: Allergies   Allergen Reactions    Aspirin Nausea Only    Codeine Not Reported This Time         Review of Systems   Review of Systems   Constitutional: Negative for activity change, chills and fever. HENT: Negative for congestion and sore throat. Eyes: Negative for pain and redness. Respiratory: Negative for cough, chest tightness and shortness of breath. Cardiovascular: Negative for chest pain and palpitations. Gastrointestinal: Positive for abdominal pain and nausea. Negative for diarrhea and vomiting. Genitourinary: Negative for dysuria, frequency and urgency. Musculoskeletal: Negative for back pain and neck pain. Skin: Negative for rash. Neurological: Negative for syncope, light-headedness and headaches. Psychiatric/Behavioral: Negative for confusion. All other systems reviewed and are negative. Physical Exam   Physical Exam  Vitals and nursing note reviewed. Constitutional:       General: She is not in acute distress. Appearance: She is well-developed. She is not diaphoretic. HENT:      Head: Normocephalic. Nose: Nose normal.      Mouth/Throat:      Pharynx: No oropharyngeal exudate. Eyes:      General: No scleral icterus. Conjunctiva/sclera: Conjunctivae normal.      Pupils: Pupils are equal, round, and reactive to light. Neck:      Thyroid: No thyromegaly. Vascular: No JVD. Trachea: No tracheal deviation. Cardiovascular:      Rate and Rhythm: Normal rate and regular rhythm. Heart sounds: No murmur heard. No friction rub. No gallop. Pulmonary:      Effort: Pulmonary effort is normal. No respiratory distress. Breath sounds: Normal breath sounds. No stridor. No wheezing or rales. Abdominal:      General: Bowel sounds are normal. There is no distension. Palpations: Abdomen is soft. Tenderness: There is abdominal tenderness. There is no guarding or rebound. Comments: Minimal left upper quadrant tenderness. Musculoskeletal:         General: Normal range of motion. Cervical back: Normal range of motion and neck supple. Lymphadenopathy:      Cervical: No cervical adenopathy. Skin:     General: Skin is warm and dry. Findings: No erythema or rash. Neurological:      Mental Status: She is alert and oriented to person, place, and time.       Cranial Nerves: No cranial nerve deficit. Motor: No abnormal muscle tone. Coordination: Coordination normal.   Psychiatric:         Behavior: Behavior normal.             Diagnostic Study Results     Labs -     Recent Results (from the past 12 hour(s))   CBC WITH AUTOMATED DIFF    Collection Time: 05/26/22  6:57 AM   Result Value Ref Range    WBC 5.6 3.6 - 11.0 K/uL    RBC 5.03 3.80 - 5.20 M/uL    HGB 11.8 11.5 - 16.0 g/dL    HCT 38.6 35.0 - 47.0 %    MCV 76.7 (L) 80.0 - 99.0 FL    MCH 23.5 (L) 26.0 - 34.0 PG    MCHC 30.6 30.0 - 36.5 g/dL    RDW 17.0 (H) 11.5 - 14.5 %    PLATELET 963 717 - 955 K/uL    MPV 9.9 8.9 - 12.9 FL    NRBC 0.0 0  WBC    ABSOLUTE NRBC 0.00 0.00 - 0.01 K/uL    NEUTROPHILS 43 32 - 75 %    LYMPHOCYTES 45 12 - 49 %    MONOCYTES 10 5 - 13 %    EOSINOPHILS 1 0 - 7 %    BASOPHILS 1 0 - 1 %    IMMATURE GRANULOCYTES 0 0.0 - 0.5 %    ABS. NEUTROPHILS 2.4 1.8 - 8.0 K/UL    ABS. LYMPHOCYTES 2.5 0.8 - 3.5 K/UL    ABS. MONOCYTES 0.6 0.0 - 1.0 K/UL    ABS. EOSINOPHILS 0.1 0.0 - 0.4 K/UL    ABS. BASOPHILS 0.0 0.0 - 0.1 K/UL    ABS. IMM. GRANS. 0.0 0.00 - 0.04 K/UL    DF AUTOMATED     METABOLIC PANEL, COMPREHENSIVE    Collection Time: 05/26/22  6:57 AM   Result Value Ref Range    Sodium 142 136 - 145 mmol/L    Potassium 4.3 3.5 - 5.1 mmol/L    Chloride 105 97 - 108 mmol/L    CO2 28 21 - 32 mmol/L    Anion gap 9 5 - 15 mmol/L    Glucose 133 (H) 65 - 100 mg/dL    BUN 13 6 - 20 MG/DL    Creatinine 1.07 (H) 0.55 - 1.02 MG/DL    BUN/Creatinine ratio 12 12 - 20      GFR est AA 60 (L) >60 ml/min/1.73m2    GFR est non-AA 49 (L) >60 ml/min/1.73m2    Calcium 9.4 8.5 - 10.1 MG/DL    Bilirubin, total 0.3 0.2 - 1.0 MG/DL    ALT (SGPT) 17 12 - 78 U/L    AST (SGOT) 13 (L) 15 - 37 U/L    Alk.  phosphatase 99 45 - 117 U/L    Protein, total 7.2 6.4 - 8.2 g/dL    Albumin 3.5 3.5 - 5.0 g/dL    Globulin 3.7 2.0 - 4.0 g/dL    A-G Ratio 0.9 (L) 1.1 - 2.2     LACTIC ACID    Collection Time: 05/26/22  6:57 AM   Result Value Ref Range    Lactic acid 2.0 0.4 - 2.0 MMOL/L   LIPASE    Collection Time: 05/26/22  6:57 AM   Result Value Ref Range    Lipase 179 73 - 393 U/L   MAGNESIUM    Collection Time: 05/26/22  6:57 AM   Result Value Ref Range    Magnesium 2.1 1.6 - 2.4 mg/dL   TROPONIN-HIGH SENSITIVITY    Collection Time: 05/26/22  6:57 AM   Result Value Ref Range    Troponin-High Sensitivity 6 0 - 51 ng/L   URINALYSIS W/MICROSCOPIC    Collection Time: 05/26/22  7:00 AM   Result Value Ref Range    Color YELLOW/STRAW      Appearance CLEAR CLEAR      Specific gravity 1.020 1.003 - 1.030      pH (UA) 6.0 5.0 - 8.0      Protein Negative NEG mg/dL    Glucose Negative NEG mg/dL    Ketone Negative NEG mg/dL    Bilirubin Negative NEG      Blood TRACE (A) NEG      Urobilinogen 0.2 0.2 - 1.0 EU/dL    Nitrites Negative NEG      Leukocyte Esterase Negative NEG      WBC 0-4 0 - 4 /hpf    RBC 0-5 0 - 5 /hpf    Epithelial cells FEW FEW /lpf    Bacteria Negative NEG /hpf       Radiologic Studies -   XR CHEST PORT   Final Result      CT ABD PELV W CONT   Final Result      1. Diverticulosis without evidence of acute diverticulitis   2. Small gallstones but no distention of the gallbladder to suggest acute   cholecystitis or ductal dilatation. Incidental 6 mm stone in the distal common   duct   3. Indeterminate hepatic masses unchanged in size        CT Results  (Last 48 hours)               05/26/22 0801  CT ABD PELV W CONT Final result    Impression:      1. Diverticulosis without evidence of acute diverticulitis   2. Small gallstones but no distention of the gallbladder to suggest acute   cholecystitis or ductal dilatation. Incidental 6 mm stone in the distal common   duct   3. Indeterminate hepatic masses unchanged in size       Narrative:  EXAM: CT ABD PELV W CONT       INDICATION: abd pain. LUQ left upper quadrant pain       COMPARISON: 2/3/2022        CONTRAST: 100 mL of Isovue-370.        ORAL CONTRAST: None       TECHNIQUE:    Following the uneventful intravenous administration of contrast, thin axial   images were obtained through the abdomen and pelvis. Coronal and sagittal   reconstructions were generated. CT dose reduction was achieved through use of a   standardized protocol tailored for this examination and automatic exposure   control for dose modulation. FINDINGS:    LOWER THORAX: No significant abnormality in the incidentally imaged lower chest.   LIVER: Multiple indeterminate hepatic masses. Lesion within segment 2 measures   4.5 cm but does not measure fluid density. It is unchanged. Lesion within   segment 3 measures fluid density likely a cyst. Lesion within segment 7 measures   17 mm but does not measure fluid density. These are all unchanged in size   BILIARY TREE: Small stones. No evidence of acute cholecystitis common duct is   not dilated however there is a 6 mm stone within the distal duct   SPLEEN: within normal limits. PANCREAS: No mass or ductal dilatation. ADRENALS: Unremarkable. KIDNEYS: No mass, calculus, or hydronephrosis. STOMACH: Small hiatal hernia. SMALL BOWEL: No dilatation or wall thickening. COLON: Diverticulosis without evidence of acute diverticulitis   APPENDIX: Not enlarged or inflamed   PERITONEUM: No ascites or pneumoperitoneum. RETROPERITONEUM: No lymphadenopathy or aortic aneurysm. There are vascular   calcifications without aneurysm   REPRODUCTIVE ORGANS: Incidental fibroids but not enlarged   URINARY BLADDER: No mass or calculus. BONES: No destructive bone lesion. ABDOMINAL WALL: No mass or hernia. ADDITIONAL COMMENTS: N/A               CXR Results  (Last 48 hours)               05/26/22 0816  XR CHEST PORT Final result    Impression:  1. Enlarged cardiac silhouette, atelectasis. No consolidation           Narrative:  INDICATION:  Abdominal pain        EXAM: Single portable view of chest 0806 . Comparison:9/16/2021       Findings: Cardiac silhouette is enlarged. Pulmonary vasculature is not engorged. There are streaky linear densities radiating from the hilum favored represent   atelectasis. No evidence of pulmonary edema or consolidation. No pneumothorax or   effusion. Medical Decision Making   I am the first provider for this patient. I reviewed the vital signs, available nursing notes, past medical history, past surgical history, family history and social history. Vital Signs-Reviewed the patient's vital signs. Patient Vitals for the past 12 hrs:   Temp Pulse Resp BP SpO2   05/26/22 0832 -- 86 16 (!) 181/80 94 %   05/26/22 0816 -- 66 17 -- 95 %   05/26/22 0731 -- 74 -- (!) 223/103 98 %   05/26/22 0715 -- 78 20 (!) 231/109 99 %   05/26/22 0642 98 °F (36.7 °C) 80 22 (!) 246/114 99 %       Pulse Oximetry Analysis - 98% on RA    Cardiac Monitor:   Rate: 80 bpm  Rhythm: Normal Sinus Rhythm        ED EKG interpretation: 6:43 AM  Rhythm: normal sinus rhythm; and regular . Rate (approx.): 80; Axis: normal; SC Interval: normal; QRS interval: normal ; ST/T wave: normal; This EKG was interpreted by Lady Shonda LUI,ED Provider. Records Reviewed: Nursing Notes and Old Medical Records    Provider Notes (Medical Decision Making):   DDx: Diverticulitis, colitis, UTI, pyelonephritis. The 4year-old female with intermittent abdominal pain over the past several months. Worsened over the past several weeks. Acutely worsened yesterday evening. No prior abdominal surgeries. No fevers or chills. No diarrhea. Previously treated with round of Cipro and Flagyl for presumptive diverticulitis. Followed by a course of Keflex started on the 21st for UTI after previous ER visit. We will proceed with CT imaging on this visit as patient has not had had any prior recent imaging. Patient is markedly hypertensive on arrival.  We will control pain first and then possibly pursue intervention for elevated blood pressure. ED Course:   Initial assessment performed.  The patients presenting problems have been discussed, and they are in agreement with the care plan formulated and outlined with them. I have encouraged them to ask questions as they arise throughout their visit. PROGRESS NOTE    Pt reevaluated. Pain completely resolved. Patient feeling much better. Afebrile nontoxic. CBC and CMP within normal limits. Normal bili. Normal LFTs. CT abdomen pelvis with unchanged liver lesion seen on prior CT scans. No acute findings on abdomen pelvis CT. No gallbladder wall thickening or evidence of cholecystitis. There was a 6 mm gallstone. Biliary colic possibly because of patient's intermittent pain over several months however pain location is rarely in the right upper quadrant area. No evidence of diverticulitis. Normal lipase. Negative troponin. Normal EKG. Clear chest x-ray. UA cleared of previous infection. Will discharge with short course tramadol and MiraLAX. Patient agreed to follow-up with Dr. Vijay Anderson. Written by Esvin Ga MD     Progress note:    Pt noted to be feeling better , ready for discharge. Updated pt and/or family on all final lab and imaging findings. Will follow up as instructed. All questions have been answered, pt voiced understanding and agreement with plan. Specific return precautions provided as well as instructions to return to the ED should sx worsen at any time. Vital signs stable for discharge. I have also put together some discharge instructions for them that include: 1) educational information regarding their diagnosis, 2) how to care for their diagnosis at home, as well a 3) list of reasons why they would want to return to the ED prior to their follow-up appointment, should their condition change. Written by Esvin Ga MD        Critical Care Time:   0    Disposition:  Discharge    PLAN:  1.    Current Discharge Medication List      START taking these medications    Details   polyethylene glycol (Miralax) 17 gram/dose powder Take 17 g by mouth daily. 1 tablespoon with 8 oz of water daily  Qty: 507 g, Refills: 0  Start date: 5/26/2022      traMADoL (ULTRAM) 50 mg tablet Take 1 Tablet by mouth every six (6) hours as needed for Pain for up to 3 days. Max Daily Amount: 200 mg. Indications: pain  Qty: 10 Tablet, Refills: 0  Start date: 5/26/2022, End date: 5/29/2022    Associated Diagnoses: Abdominal pain, LUQ (left upper quadrant)           2. Follow-up Information     Follow up With Specialties Details Why Contact Info    [facility] Schedule an appointment as soon as possible for a visit in 1 week  [address] [place] [id number]  [phone]      [address] [place] [address] Emergency Medicine Go in 1 day If symptoms worsen [facility]  [phone]        Return to ED if worse     Diagnosis     Clinical Impression:   1. Abdominal pain, LUQ (left upper quadrant)    2. Essential hypertension    3. Gallstones              Please note that this dictation was completed with [employer], the [employer] voice recognition software. Quite often unanticipated grammatical, syntax, homophones, and other interpretive errors are inadvertently transcribed by the computer software. Please disregard these errors. Please excuse any errors that have escaped final proofreading.

## 2022-05-28 LAB
ATRIAL RATE: 80 BPM
CALCULATED P AXIS, ECG09: 51 DEGREES
CALCULATED R AXIS, ECG10: 38 DEGREES
CALCULATED T AXIS, ECG11: 46 DEGREES
DIAGNOSIS, 93000: NORMAL
P-R INTERVAL, ECG05: 180 MS
Q-T INTERVAL, ECG07: 392 MS
QRS DURATION, ECG06: 74 MS
QTC CALCULATION (BEZET), ECG08: 452 MS
VENTRICULAR RATE, ECG03: 80 BPM

## 2022-08-23 ENCOUNTER — ANESTHESIA EVENT (OUTPATIENT)
Dept: SURGERY | Age: 82
End: 2022-08-23
Payer: MEDICARE

## 2022-08-23 NOTE — ANESTHESIA PREPROCEDURE EVALUATION
Relevant Problems   CARDIOVASCULAR   (+) Essential hypertension      GASTROINTESTINAL   (+) Gastroesophageal reflux disease without esophagitis      ENDOCRINE   (+) Non morbid obesity due to excess calories       Anesthetic History   No history of anesthetic complications            Review of Systems / Medical History  Patient summary reviewed, nursing notes reviewed and pertinent labs reviewed    Pulmonary  Within defined limits                 Neuro/Psych   Within defined limits           Cardiovascular    Hypertension          Hyperlipidemia         GI/Hepatic/Renal     GERD           Endo/Other        Obesity     Other Findings            Physical Exam    Airway  Mallampati: II  TM Distance: 4 - 6 cm  Neck ROM: normal range of motion   Mouth opening: Normal     Cardiovascular  Regular rate and rhythm,  S1 and S2 normal,  no murmur, click, rub, or gallop    Rate: normal         Dental  No notable dental hx       Pulmonary  Breath sounds clear to auscultation               Abdominal  GI exam deferred       Other Findings            Anesthetic Plan    ASA: 2  Anesthesia type: MAC          Induction: Intravenous  Anesthetic plan and risks discussed with: Patient

## 2022-08-25 NOTE — PERIOP NOTES
[address] [place] [address]  SURGICAL PRE-ADMISSION INSTRUCTIONS    ARRIVAL  You will be called the day before your surgery with your expected arrival time. Sign in at the  of the hospital.  You will be directed to the Surgical Waiting Room. Please arrive at your scheduled appointment time. You have been scheduled to arrive for your procedure one or two hours prior to the expected start time of your procedure. Every effort will be made to minimize your wait but please be aware that unforeseen circumstances may affect our schedule. EATING  DO NOT EAT OR DRINK ANYTHING AFTER MIDNIGHT ON THE EVENING BEFORE YOUR SURGERY OR ON THE DAY OF YOUR SURGERY except for your medications (as instructed) with a sip of water. Do not use gum, mints or lozenges on the morning of your surgery. Please do not smoke or chew tobacco before your surgery. MEDICATIONS   Take the following medications on the morning of your surgery with the smallest amount of water possible : NONE    STOP THESE MEDICATIONS AT THE TIMES LISTED BELOW  NONE     DRIVING/TRANSPORATION  Have a responsible adult to drive you home from the hospital and to stay with you over night. Please have them plan to remain in the hospital during your surgery. Your surgery will not be done if you do not have a responsible adult to take you home and to stay with you. If you have arranged for public transport, you must have a responsible adult to ride with you (who is not the ). You may not drive for 24 hours after anesthesia. PREPARATION  If you have a Living WiIl/Advance Directive, please bring a copy with you to scan into your chart. Please DO NOT wear makeup or nail polish  Please leave valuables at home,  DO NOT wear jewelry. Wear loose, comfortable clothing that is large enough to cover a bulky dressing.     SPECIAL INSTRUCTIONS:  Follow instructions for eye drops    Reviewed above preoperative instructions and answered questions by phone interview    Patient:  Stephen Reid   Date:     August 25, 2022  Time:   12:54 PM    RN:  Sarath Winn RN    Date:     August 25, 2022  Time:   12:54 PM

## 2022-08-30 PROBLEM — H25.11 AGE-RELATED NUCLEAR CATARACT, RIGHT EYE: Chronic | Status: ACTIVE | Noted: 2022-08-30

## 2022-08-30 PROBLEM — H25.11 AGE-RELATED NUCLEAR CATARACT, RIGHT EYE: Status: ACTIVE | Noted: 2022-08-30

## 2022-08-31 ENCOUNTER — ANESTHESIA (OUTPATIENT)
Dept: SURGERY | Age: 82
End: 2022-08-31
Payer: MEDICARE

## 2022-08-31 ENCOUNTER — HOSPITAL ENCOUNTER (OUTPATIENT)
Age: 82
Setting detail: OUTPATIENT SURGERY
Discharge: HOME | End: 2022-08-31
Attending: OPHTHALMOLOGY | Admitting: OPHTHALMOLOGY
Payer: MEDICARE

## 2022-08-31 VITALS
HEIGHT: 66 IN | RESPIRATION RATE: 16 BRPM | HEART RATE: 58 BPM | SYSTOLIC BLOOD PRESSURE: 184 MMHG | OXYGEN SATURATION: 99 % | WEIGHT: 215 LBS | TEMPERATURE: 97.8 F | DIASTOLIC BLOOD PRESSURE: 78 MMHG | BODY MASS INDEX: 34.55 KG/M2

## 2022-08-31 PROBLEM — H25.11 AGE-RELATED NUCLEAR CATARACT, RIGHT EYE: Chronic | Status: RESOLVED | Noted: 2022-08-30 | Resolved: 2022-08-31

## 2022-08-31 PROBLEM — H25.11 AGE-RELATED NUCLEAR CATARACT, RIGHT EYE: Status: RESOLVED | Noted: 2022-08-30 | Resolved: 2022-08-31

## 2022-08-31 PROCEDURE — 77030035283: Performed by: OPHTHALMOLOGY

## 2022-08-31 PROCEDURE — 77030007855 HC KNF OPHTH IKNF ALCN -A: Performed by: OPHTHALMOLOGY

## 2022-08-31 PROCEDURE — 74011250636 HC RX REV CODE- 250/636: Performed by: ANESTHESIOLOGY

## 2022-08-31 PROCEDURE — 77030020828: Performed by: OPHTHALMOLOGY

## 2022-08-31 PROCEDURE — 2709999900 HC NON-CHARGEABLE SUPPLY: Performed by: OPHTHALMOLOGY

## 2022-08-31 PROCEDURE — 74011000250 HC RX REV CODE- 250: Performed by: OPHTHALMOLOGY

## 2022-08-31 PROCEDURE — 76210000020 HC REC RM PH II FIRST 0.5 HR: Performed by: OPHTHALMOLOGY

## 2022-08-31 PROCEDURE — 77030013353: Performed by: OPHTHALMOLOGY

## 2022-08-31 PROCEDURE — 76060000031 HC ANESTHESIA FIRST 0.5 HR: Performed by: OPHTHALMOLOGY

## 2022-08-31 PROCEDURE — 77030014067 HC TIP PHACO KLMN ALCN -B: Performed by: OPHTHALMOLOGY

## 2022-08-31 PROCEDURE — 76010000154 HC OR TIME FIRST 0.5 HR: Performed by: OPHTHALMOLOGY

## 2022-08-31 PROCEDURE — V2632 POST CHMBR INTRAOCULAR LENS: HCPCS | Performed by: OPHTHALMOLOGY

## 2022-08-31 PROCEDURE — 77030018831 HC SOL IRR H20 BAXT -A: Performed by: OPHTHALMOLOGY

## 2022-08-31 PROCEDURE — 74011250636 HC RX REV CODE- 250/636: Performed by: OPHTHALMOLOGY

## 2022-08-31 PROCEDURE — 77030013987 HC SLV OPTH IRR ALCN -B: Performed by: OPHTHALMOLOGY

## 2022-08-31 PROCEDURE — 77030019888 HC RNG PUPIL MALYGN MSRT -C: Performed by: OPHTHALMOLOGY

## 2022-08-31 DEVICE — LENS IOL POST 1-PC 6X13 19.5 -- ACRYSOF: Type: IMPLANTABLE DEVICE | Site: EYE | Status: FUNCTIONAL

## 2022-08-31 RX ORDER — ONDANSETRON 2 MG/ML
4 INJECTION INTRAMUSCULAR; INTRAVENOUS
Status: DISCONTINUED | OUTPATIENT
Start: 2022-08-31 | End: 2022-08-31 | Stop reason: HOSPADM

## 2022-08-31 RX ORDER — SODIUM CHLORIDE, SODIUM LACTATE, POTASSIUM CHLORIDE, CALCIUM CHLORIDE 600; 310; 30; 20 MG/100ML; MG/100ML; MG/100ML; MG/100ML
50 INJECTION, SOLUTION INTRAVENOUS CONTINUOUS
Status: DISCONTINUED | OUTPATIENT
Start: 2022-08-31 | End: 2022-08-31 | Stop reason: HOSPADM

## 2022-08-31 RX ORDER — EPINEPHRINE 1 MG/ML
INJECTION, SOLUTION, CONCENTRATE INTRAVENOUS
Status: DISCONTINUED
Start: 2022-08-31 | End: 2022-08-31 | Stop reason: HOSPADM

## 2022-08-31 RX ORDER — TROPICAMIDE 10 MG/ML
1 SOLUTION/ DROPS OPHTHALMIC
Status: COMPLETED | OUTPATIENT
Start: 2022-08-31 | End: 2022-08-31

## 2022-08-31 RX ORDER — SODIUM CHLORIDE, SODIUM LACTATE, POTASSIUM CHLORIDE, CALCIUM CHLORIDE 600; 310; 30; 20 MG/100ML; MG/100ML; MG/100ML; MG/100ML
25 INJECTION, SOLUTION INTRAVENOUS CONTINUOUS
Status: DISCONTINUED | OUTPATIENT
Start: 2022-08-31 | End: 2022-08-31 | Stop reason: HOSPADM

## 2022-08-31 RX ORDER — PHENYLEPHRINE HYDROCHLORIDE 100 MG/ML
1 SOLUTION/ DROPS OPHTHALMIC
Status: COMPLETED | OUTPATIENT
Start: 2022-08-31 | End: 2022-08-31

## 2022-08-31 RX ORDER — KETOROLAC TROMETHAMINE 5 MG/ML
1 SOLUTION OPHTHALMIC
Status: COMPLETED | OUTPATIENT
Start: 2022-08-31 | End: 2022-08-31

## 2022-08-31 RX ORDER — SODIUM CHLORIDE 0.9 % (FLUSH) 0.9 %
5-40 SYRINGE (ML) INJECTION EVERY 8 HOURS
Status: DISCONTINUED | OUTPATIENT
Start: 2022-08-31 | End: 2022-08-31 | Stop reason: HOSPADM

## 2022-08-31 RX ORDER — DIPHENHYDRAMINE HYDROCHLORIDE 50 MG/ML
12.5 INJECTION, SOLUTION INTRAMUSCULAR; INTRAVENOUS
Status: DISCONTINUED | OUTPATIENT
Start: 2022-08-31 | End: 2022-08-31 | Stop reason: HOSPADM

## 2022-08-31 RX ORDER — LIDOCAINE HYDROCHLORIDE 20 MG/ML
2 INJECTION, SOLUTION EPIDURAL; INFILTRATION; INTRACAUDAL; PERINEURAL ONCE
Status: COMPLETED | OUTPATIENT
Start: 2022-08-31 | End: 2022-08-31

## 2022-08-31 RX ORDER — TETRACAINE HYDROCHLORIDE 5 MG/ML
2 SOLUTION OPHTHALMIC AS NEEDED
Status: DISCONTINUED | OUTPATIENT
Start: 2022-08-31 | End: 2022-08-31 | Stop reason: HOSPADM

## 2022-08-31 RX ORDER — MIDAZOLAM HYDROCHLORIDE 1 MG/ML
INJECTION, SOLUTION INTRAMUSCULAR; INTRAVENOUS AS NEEDED
Status: DISCONTINUED | OUTPATIENT
Start: 2022-08-31 | End: 2022-08-31 | Stop reason: HOSPADM

## 2022-08-31 RX ORDER — TOBRAMYCIN AND DEXAMETHASONE 3; 1 MG/ML; MG/ML
1 SUSPENSION/ DROPS OPHTHALMIC AS NEEDED
Status: COMPLETED | OUTPATIENT
Start: 2022-08-31 | End: 2022-08-31

## 2022-08-31 RX ORDER — TETRACAINE HYDROCHLORIDE 5 MG/ML
1 SOLUTION OPHTHALMIC AS NEEDED
Status: COMPLETED | OUTPATIENT
Start: 2022-08-31 | End: 2022-08-31

## 2022-08-31 RX ORDER — SODIUM CHLORIDE 0.9 % (FLUSH) 0.9 %
5-40 SYRINGE (ML) INJECTION AS NEEDED
Status: DISCONTINUED | OUTPATIENT
Start: 2022-08-31 | End: 2022-08-31 | Stop reason: HOSPADM

## 2022-08-31 RX ORDER — ONDANSETRON 2 MG/ML
INJECTION INTRAMUSCULAR; INTRAVENOUS AS NEEDED
Status: DISCONTINUED | OUTPATIENT
Start: 2022-08-31 | End: 2022-08-31 | Stop reason: HOSPADM

## 2022-08-31 RX ADMIN — TROPICAMIDE 1 DROP: 10 SOLUTION/ DROPS OPHTHALMIC at 09:13

## 2022-08-31 RX ADMIN — TROPICAMIDE 1 DROP: 10 SOLUTION/ DROPS OPHTHALMIC at 09:18

## 2022-08-31 RX ADMIN — TETRACAINE HYDROCHLORIDE 1 DROP: 5 SOLUTION OPHTHALMIC at 09:13

## 2022-08-31 RX ADMIN — LIDOCAINE HYDROCHLORIDE 2 DROP: 35 GEL OPHTHALMIC at 09:13

## 2022-08-31 RX ADMIN — SODIUM CHLORIDE, POTASSIUM CHLORIDE, SODIUM LACTATE AND CALCIUM CHLORIDE 25 ML/HR: 600; 310; 30; 20 INJECTION, SOLUTION INTRAVENOUS at 09:16

## 2022-08-31 RX ADMIN — TROPICAMIDE 1 DROP: 10 SOLUTION/ DROPS OPHTHALMIC at 09:08

## 2022-08-31 RX ADMIN — PHENYLEPHRINE HYDROCHLORIDE 1 DROP: 100 SOLUTION/ DROPS OPHTHALMIC at 09:18

## 2022-08-31 RX ADMIN — KETOROLAC TROMETHAMINE 1 DROP: 0.5 SOLUTION OPHTHALMIC at 09:18

## 2022-08-31 RX ADMIN — KETOROLAC TROMETHAMINE 1 DROP: 0.5 SOLUTION OPHTHALMIC at 09:08

## 2022-08-31 RX ADMIN — LIDOCAINE HYDROCHLORIDE 2 DROP: 35 GEL OPHTHALMIC at 09:08

## 2022-08-31 RX ADMIN — MIDAZOLAM 2 MG: 1 INJECTION INTRAMUSCULAR; INTRAVENOUS at 09:40

## 2022-08-31 RX ADMIN — TETRACAINE HYDROCHLORIDE 1 DROP: 5 SOLUTION OPHTHALMIC at 09:18

## 2022-08-31 RX ADMIN — KETOROLAC TROMETHAMINE 1 DROP: 0.5 SOLUTION OPHTHALMIC at 09:13

## 2022-08-31 RX ADMIN — PHENYLEPHRINE HYDROCHLORIDE 1 DROP: 100 SOLUTION/ DROPS OPHTHALMIC at 09:08

## 2022-08-31 RX ADMIN — ONDANSETRON 4 MG: 2 INJECTION INTRAMUSCULAR; INTRAVENOUS at 09:40

## 2022-08-31 RX ADMIN — PHENYLEPHRINE HYDROCHLORIDE 1 DROP: 100 SOLUTION/ DROPS OPHTHALMIC at 09:13

## 2022-08-31 RX ADMIN — LIDOCAINE HYDROCHLORIDE 2 DROP: 35 GEL OPHTHALMIC at 09:18

## 2022-08-31 RX ADMIN — TETRACAINE HYDROCHLORIDE 1 DROP: 5 SOLUTION OPHTHALMIC at 09:08

## 2022-08-31 NOTE — OP NOTES
Mark Nargis  OPERATIVE REPORT    Name:  Montana Sol  MR#:  [id number]  :  1940  ACCOUNT #:  [de-identified]  DATE OF SERVICE:  2022    PREOPERATIVE DIAGNOSIS:  Age-related nuclear cataract, right eye. POSTOPERATIVE DIAGNOSIS:  Pseudophakia, right eye. PROCEDURE PERFORMED:  Complex phacoemulsification of cataract with intraocular lens implant due to floppy iris syndrome and poor dilation requiring use of Malyugin ring for patient safety. SURGEON:  Serafin Presley MD    ASSISTANT:  None. ANESTHESIA:  Topical 3.5% lidocaine gel, 0.5% tetracaine drops, IV sedation by Anesthesia, and 2% preservative-free intraocular lidocaine. COMPLICATIONS:  None. SPECIMENS REMOVED:  None. IMPLANTS:  IOL. ESTIMATED BLOOD LOSS:  None. INDICATIONS/HISTORY:  This 81-year-old black female noted painless progressive decreased visual acuity in her right eye secondary to cataract formation affecting distance and near vision, driving and reading, and not improved by refraction. She presents for an elective extracapsular cataract extraction with lens implant in her right eye to improve vision and lifestyle. She had cataract surgery with lens implant in her left eye in 2015. In addition to cataract, she has a history of hypertension, allergies, elevated cholesterol, anemia, hemangioma of the liver, sciatica, vitamin D deficiency, and gastroesophageal reflux disease. Her current medications include Tylenol, Elavil, Norvasc, vitamin D3, vitamin B12, multivitamins, Voltaren gel as needed, Aricept, Flonase, HydroDIURIL, Claritin, Cozaar, Myrbetriq, Zofran, MiraLax, sodium chloride, and Hytrin. She relates a history of allergies to lovastatin, codeine, diclofenac, and shellfish or derived products.   She was cleared for surgery by Niesha Valiente MD.  Ocular examination at the time of admission reveals a best corrected visual acuity with glare of 20/60 in the right eye and 20/25 in the left eye with intraocular pressures of 18 mmHg, both eyes. Extraocular examination reveals full fields to confrontation and normal motility with bilateral ptosis and exophthalmos. Anterior segment shows normal conjunctiva with clear corneas, open angles, deep clear chambers; equal, round, and reactive pupils, and a normal iris. She has a 6+ nuclear sclerotic lens with posterior subcapsular change in the right eye and a posterior chamber IOL with early posterior capsular opacification in the left eye. Dilated fundus shows a 0.4 cup-to-disk ratio with normal blood vessels and dull foveal reflexes. DESCRIPTION OF PROCEDURE:  The patient was taken to the main operating room after receiving pupillary dilation, application of Honan balloon, topical anesthesia in the pre-op area, placed in the supine position and given IV sedation by Anesthesia. The patient was prepped and draped in the standard fashion for intraocular microsurgery of the right eye with a temporal approach. A limbal paracentesis was made with a 15-degree blade and the anterior chamber filled with Viscoat. A gracia keratome was used to enter the anterior chamber from the temporal limbus in a four-plane fashion creating a 3 mm self-sealing corneal tunnel incision. A Malyugin pupillary expanding ring was introduced into the anterior chamber and the pupil engaged at the 9, 12, 3 and 6 o'clock positions. An anterior capsulorrhexis was performed with capsulorrhexis forceps followed by hydrodissection and hydrodelineation of the nucleus in the capsular bag with balanced salt solution achieving rotation. A CDE of 4.76, a phacoemulsification time of 35.1 seconds, at an average power of 5.2% was required to remove the nucleus in a phaco chop technique in burst mode at high vacuum using OZil technology followed by irrigation/aspiration of the remaining cortex and vacuum polishing of the posterior capsule.   The capsular bag was then filled with Provisc and an Barney AcrySof posterior chamber foldable acrylic intraocular lens, model [id number], power +19.5 diopters, was injected into the capsular bag using an Barney cartridge and the lens centered. A lens manipulator was then introduced into the anterior chamber and dislodged the Malyugin ring from the pupil. The  was reintroduced into the anterior chamber, the ring grasped and withdrawn into the  for removal.    Irrigation/aspiration was used to remove the Provisc from the anterior chamber and capsular bag. The anterior chamber was filled with balanced salt solution and the incisions tested and found to be water-tight without a suture. A collagen shield soaked in Tobradex ophthalmic drops and tetracaine drops was placed on the cornea followed by Pred-G ophthalmic ointment. The speculum and drape were then removed and an eye shield taped over the eye. The patient tolerated the procedure well and left the operating room for the step-down unit under the care of Anesthesia in a satisfactory postop condition, alert and awake. The patient is to be discharged home when released by Anesthesia, to remain at bed rest with head elevated for the next 24 hours, resume all customary preop diet and medications and take Tylenol as needed for discomfort. The patient has a followup appointment in my office on 09/01/2022 at 04:15 p.m.         Marko Murry MD      [id number]  D:  08/31/2022 10:24  T:  08/31/2022 10:57  JOB #:  [id number]

## 2022-08-31 NOTE — ANESTHESIA POSTPROCEDURE EVALUATION
Post-Anesthesia Evaluation and Assessment    Cardiovascular Function/Vital Signs  Visit Vitals  BP (!) 184/78 (BP 1 Location: Right upper arm, BP Patient Position: At rest;Sitting)   Pulse (!) 58   Temp 36.6 °C (97.8 °F)   Resp 16   Ht 5' 6\" (1.676 m)   Wt 97.5 kg (215 lb)   SpO2 99%   BMI 34.70 kg/m²       Patient is status post Procedure(s) with comments:  RIGHT EYE CATARACT EXTRACTION WITH INTRA OCULAR LENS IMPLANT (LOCAL, MAC) - Complex case due to floppy iris and poor dilation requiring malyugin ring. Procedure #2  CDE-4.76  TIME-35.1  Power-5.2. Nausea/Vomiting: Controlled. Postoperative hydration reviewed and adequate. Pain:  Pain Scale 1: Numeric (0 - 10) (08/31/22 0851)  Pain Intensity 1: 0 (08/31/22 0851)   Managed. Neurological Status:   Neuro (WDL): Within Defined Limits (08/31/22 0843)   At baseline. Mental Status and Level of Consciousness: Arousable. Pulmonary Status:   O2 Device: None (Room air) (08/31/22 1012)   Adequate oxygenation and airway patent. Complications related to anesthesia: None    Patient has met all discharge requirements.     Signed By: Bridget Cornejo MD    August 31, 2022

## 2022-08-31 NOTE — INTERVAL H&P NOTE
Update History & Physical    The Patient's History and Physical of August 18, 2022 was reviewed with the patient and I examined the patient. There was no change. The surgical site was confirmed by the patient and me. Plan:  The risk, benefits, expected outcome, and alternative to the recommended procedure have been discussed with the patient. Patient understands and wants to proceed with the procedure.     Electronically signed by Jumana Casillas MD on 8/31/2022 at 9:26 AM

## 2022-08-31 NOTE — DISCHARGE INSTRUCTIONS
[facility] EYE CARE      POST OPERATIVE INSTRUCTIONS AND INFORMATION         THE FIRST 24 HOURS AFTER SURGERY, YOU WILL BE ASKED TO REMAIN AT BEDREST WITH YOUR HEAD ELEVATED AT 39 DEGREES THIS CAN BE DONE BY SLEEPING ON THE PILLOWS OR IN A RECLINER. YOU CAN GET UP AS NECESSARY. YOUR EYE SHIELD MUST REMAIN FIRMLY IN PLACE FOR THE FIRST 24 HOURS. IT WILL BE REMOVED IN THE OFFICE ON THE DAY FOLLOWING SURGERY WHEN I EXAMINE YOUR EYE.       YOU WILL NEED TO BRING ALL EYE MEDICATIONS TO YOUR APPOINTMENT THE DAY AFTER SURGERY. 3.   YOUR POST OP VISIT SCHEDULE IS AS FOLLOWS:             * ONE DAY AFTER SURGERY             * ONE WEEK AFTER SURGERY             * SIX WEEKS AFTER SURGERY (SPECTACLE REFRACTION AND DILATED EXAMINATION)    IT IS IMPORTANT THAT YOU WEAR EYE PROTECTION AT ALL TIMES FOR THE      FIRST WEEK AFTER EACH CATARACT SURGERY. DURING THE DAY, YOU WILL NEED TO WEAR EITHER OUR CURRENT SPECTACLES WITH A PLAIN WINDOW GLASS LENS OR YOU MAY WISH TO PURCHASE A PAIR OF DRUG STORE BIFOCALS WITH A POWER OF +2.50 OR SO. WHEN OUTSIDE, YOU MUST WEAR THE SOLAR CAMPBELL THAT ARE PROVIDED FOR YOU. AT BEDTIME, YOU MUST WEAR THE EYE SHIELD THAT IS ALSO PROVIDED. AGAIN THIS IS FOR THE FIRST WEEK FOLLOWING YOUR SURGERY. IMPORTANT DOS AND DONTS:             *AVOID CONSTIPATION             *DO NOT PARTICIPATE IN SPORTS OR STRENUOUS PHYSICAL ACTIVITY INCLUDING                         SEXUAL RELATIONS             *DO NOT DRIVE FOR ONE WEEK OFTER EACH CATARACT SURGERY             *AVOID POWDERS, SPRAYS, OR OTHER THINGS THAT MIGHT GET IN YOUR EYES             *DO NOT RUB YOUR EYE OR PUT ANY PRESSURE ON YOUR EYE    IF YOUR HAIR IS WASHED BY SOMEONE ELSE, HAVE THEM POSITION YOU SO THAT YOU LEAN YOUR HEAD BACKWARDS INTO THE SINK TO AVOID SOAPY WATER FROM GETTING IN YOUR EYES. YOU SHOULD ALSO WEAR YOUR EYE SHIELD TO PREVENT INJURY OR CONTAMINATION TO THE EYE.             DO NOT HESITATE TO CALL OUR OFFICE IF YOU FEEL SOMETHING IS NOT RIGHT OR YOU HAVE ANY QUESTIONS, UNUSUAL SYMPTOMS, OR SUDDEN CHANGES IN YOUR VISION. OUR TELEPHONE NUMBERS:              [facility] OFFICE              [phone]              *[facility] OFFICE         [phone]      POST OPERATIVE INSTRUCTIONS AND INFORMATION         THE FIRST 24 HOURS AFTER SURGERY, YOU WILL BE ASKED TO REMAIN AT BEDREST WITH YOUR HEAD ELEVATED AT 39 DEGREES THIS CAN BE DONE BY SLEEPING ON THE PILLOWS OR IN A RECLINER. YOU CAN GET UP AS NECESSARY. IF AN EYE SHIELD IS PLACED, IT  MUST REMAIN FIRMLY IN PLACE FOR THE FIRST 24 HOURS. IT WILL BE REMOVED IN THE OFFICE ON THE DAY FOLLOWING SURGERY WHEN I EXAMINE YOUR EYE. IF YOU ARE DISCHARGED WITH SUN GLASSES, THEY ARE TO BE WORN UNTIL BEDTIME WHEN AN EYE SHIELD IS TO BE TAPED OVER THE OPERATED EYE FOR SLEEP. YOU WILL NEED TO BRING ALL EYE MEDICATIONS TO YOUR APPOINTMENT THE DAY AFTER SURGERY. 3.   YOUR POST OP VISIT SCHEDULE IS AS FOLLOWS:             * ONE DAY AFTER SURGERY             * ONE WEEK AFTER SURGERY             * SIX WEEKS AFTER SURGERY (SPECTACLE REFRACTION AND DILATED EXAMINATION)    IT IS IMPORTANT THAT YOU WEAR EYE PROTECTION AT ALL TIMES FOR THE      FIRST WEEK AFTER EACH CATARACT SURGERY. DURING THE DAY, YOU WILL NEED TO WEAR EITHER OUR CURRENT SPECTACLES WITH A PLAIN WINDOW GLASS LENS OR YOU MAY WISH TO PURCHASE A PAIR OF DRUG STORE BIFOCALS WITH A POWER OF +2.50 OR SO. WHEN OUTSIDE, YOU MUST WEAR THE SOLAR CAMPBELL THAT ARE PROVIDED FOR YOU. AT BEDTIME, YOU MUST WEAR THE EYE SHIELD THAT IS ALSO PROVIDED. AGAIN THIS IS FOR THE FIRST WEEK FOLLOWING YOUR SURGERY.     IMPORTANT DOS AND DONTS:             *AVOID CONSTIPATION             *DO NOT PARTICIPATE IN SPORTS OR STRENUOUS PHYSICAL ACTIVITY INCLUDING                         SEXUAL RELATIONS             *DO NOT DRIVE FOR ONE WEEK OFTER EACH CATARACT SURGERY             *AVOID POWDERS, SPRAYS, OR OTHER THINGS THAT MIGHT GET IN YOUR EYES             *DO NOT RUB YOUR EYE OR PUT ANY PRESSURE ON YOUR EYE    IF YOUR HAIR IS WASHED BY SOMEONE ELSE, HAVE THEM POSITION YOU SO THAT YOU LEAN YOUR HEAD BACKWARDS INTO THE SINK TO AVOID SOAPY WATER FROM GETTING IN YOUR EYES. YOU SHOULD ALSO WEAR YOUR EYE SHIELD TO PREVENT INJURY OR CONTAMINATION TO THE EYE. DO NOT HESITATE TO CALL OUR OFFICE IF YOU FEEL SOMETHING IS NOT RIGHT OR YOU HAVE ANY QUESTIONS, UNUSUAL SYMPTOMS, OR SUDDEN CHANGES IN YOUR VISION. OUR TELEPHONE NUMBERS:              [facility]              [phone]              *[place] OFFICE         [phone]    THANK YOU FOR ENTRUSTING YOUR EYE CARE TO US.    10.  YOU HAVE RECEIVED SEDATION AS PART OF YOUR PROCEDURE. NO DRIVING OR OPERATING HEAVY MACHINERY FOR 24 HOURS. YOUR                       BALANCE AND JUDGEMENT MAY BE IMPAIRED. DROWSINESS MAY ALSO OCCUR. [address] YOU FOR ENTRUSTING YOUR EYE CARE TO US.    10.  YOU HAVE RECEIVED SEDATION AS PART OF YOUR PROCEDURE. NO DRIVING OR OPERATING HEAVY MACHINERY FOR 24 HOURS. YOUR                       BALANCE AND JUDGEMENT MAY BE IMPAIRED. DROWSINESS MAY ALSO OCCUR.

## 2022-08-31 NOTE — BRIEF OP NOTE
Brief Postoperative Note    Patient: Kumar Bailey  YOB: 1940  MRN: [id number]    Date of Procedure: 8/31/2022     Pre-Op Diagnosis: AGE RELATED NUCLEAR CATARACT RIGHT EYE    Post-Op Diagnosis:  [PSEUDOPHAKIA RIGHT EYE       Procedure(s):  COMPLEX RIGHT EYE CATARACT EXTRACTION WITH INTRA OCULAR LENS IMPLANT DUE TO FLOPPY IRIS AND POOR DILATION REQUIRING A MALYUGIN RING (LOCAL, MAC)    Surgeon(s):  Kylie Waters MD    Surgical Assistant: None    Anesthesia: MAC     Estimated Blood Loss (mL): NONE    Complications: None    Specimens: * No specimens in log *     Implants:   Implant Name Type Inv.  Item Serial No.  Lot No. LRB No. Used Action   LENS INTOCU 6.0 TO 30.0 DIOPT 118.7 A CONSTANT 0DEG ANG - [id number] Intraocular Lens LENS INTOCU 6.0 TO 30.0 DIOPT 118.7 A CONSTANT 0DEG ANG [id number] [employer]WD  Right 1 Implanted       Drains: * No LDAs found *    Findings: CATARACT, FLOPPY IRIS SYNDROME, POOR DILATION    Electronically Signed by Jv Lopes MD on 8/31/2022 at 10:13 AM

## 2022-10-31 ENCOUNTER — HOSPITAL ENCOUNTER (EMERGENCY)
Age: 82
Discharge: HOME | End: 2022-10-31
Attending: FAMILY MEDICINE
Payer: MEDICARE

## 2022-10-31 VITALS
WEIGHT: 223 LBS | OXYGEN SATURATION: 98 % | DIASTOLIC BLOOD PRESSURE: 90 MMHG | TEMPERATURE: 98.2 F | RESPIRATION RATE: 20 BRPM | HEART RATE: 68 BPM | SYSTOLIC BLOOD PRESSURE: 210 MMHG | BODY MASS INDEX: 35.99 KG/M2

## 2022-10-31 DIAGNOSIS — I10 HYPERTENSION, UNSPECIFIED TYPE: ICD-10-CM

## 2022-10-31 DIAGNOSIS — N30.00 ACUTE CYSTITIS WITHOUT HEMATURIA: Primary | ICD-10-CM

## 2022-10-31 LAB
APPEARANCE UR: CLEAR
BACTERIA URNS QL MICRO: ABNORMAL /HPF
BILIRUB UR QL: NEGATIVE
COLOR UR: ABNORMAL
EPITH CASTS URNS QL MICRO: ABNORMAL /LPF
GLUCOSE UR STRIP.AUTO-MCNC: NEGATIVE MG/DL
HGB UR QL STRIP: ABNORMAL
KETONES UR QL STRIP.AUTO: NEGATIVE MG/DL
LEUKOCYTE ESTERASE UR QL STRIP.AUTO: ABNORMAL
NITRITE UR QL STRIP.AUTO: NEGATIVE
PH UR STRIP: 5.5 [PH] (ref 5–8)
PROT UR STRIP-MCNC: NEGATIVE MG/DL
RBC #/AREA URNS HPF: ABNORMAL /HPF (ref 0–5)
SP GR UR REFRACTOMETRY: 1.01 (ref 1–1.03)
UA: UC IF INDICATED,UAUC: ABNORMAL
UROBILINOGEN UR QL STRIP.AUTO: 0.2 EU/DL (ref 0.2–1)
WBC URNS QL MICRO: ABNORMAL /HPF (ref 0–4)

## 2022-10-31 PROCEDURE — 81001 URINALYSIS AUTO W/SCOPE: CPT

## 2022-10-31 PROCEDURE — 87086 URINE CULTURE/COLONY COUNT: CPT

## 2022-10-31 PROCEDURE — 74011250637 HC RX REV CODE- 250/637: Performed by: FAMILY MEDICINE

## 2022-10-31 PROCEDURE — 99283 EMERGENCY DEPT VISIT LOW MDM: CPT

## 2022-10-31 PROCEDURE — 87077 CULTURE AEROBIC IDENTIFY: CPT

## 2022-10-31 PROCEDURE — 87186 SC STD MICRODIL/AGAR DIL: CPT

## 2022-10-31 RX ORDER — CEPHALEXIN 250 MG/1
500 CAPSULE ORAL
Status: COMPLETED | OUTPATIENT
Start: 2022-10-31 | End: 2022-10-31

## 2022-10-31 RX ADMIN — CEPHALEXIN 500 MG: 250 CAPSULE ORAL at 05:18

## 2022-10-31 NOTE — ED PROVIDER NOTES
EMERGENCY DEPARTMENT HISTORY AND PHYSICAL EXAM          Date: 10/31/2022  Patient Name: Juanis Yancey    History of Presenting Illness     Chief Complaint   Patient presents with    Abdominal Pain     Super pubic discomfort. History Provided By: Patient    HPI: Juanis Yancey is a 81 y.o. female, pmhx as below, who was brought by her daughter to the ED c/o an uncomfortable feeling \"like a rolling feeling, just above my vagina. \" She was awakened by the feeling at 0230, and she told her daughter, who brought her to the ED. She is able to urinate normally, and she denies any upper abdominal pain. No recent illnesses. She corrects one if they call the feeling \"pain;\" it is more of a pressure. No constipation or diarrhea. She has been able to eat normally. Of note she has not taken her blood pressure medication for the last week. PCP: Esther Astudillo    Allergies: aspirin, codeine  Social Hx: No tobacco, vaping, ETOH; Lives locally    There are no other complaints, changes, or physical findings at this time. Current Outpatient Medications   Medication Sig Dispense Refill    donepeziL (ARICEPT) 5 mg tablet Take 5mg daily for 30 days then increase to 10mg daily (Patient not taking: Reported on 8/25/2022)      terazosin (HYTRIN) 2 mg capsule terazosin 2 mg capsule   Take 1 capsule every day by oral route. hydroCHLOROthiazide (MICROZIDE) 12.5 mg capsule hydrochlorothiazide 12.5 mg capsule   Take 1 capsule every day by oral route. (Patient not taking: Reported on 8/25/2022)      polyethylene glycol (Miralax) 17 gram/dose powder Take 17 g by mouth daily. 1 tablespoon with 8 oz of water daily (Patient not taking: Reported on 8/25/2022) 507 g 0    dicyclomine (BENTYL) 20 mg tablet Take 1 Tablet by mouth every six (6) hours as needed for Abdominal Cramps. 15 Tablet 0    ondansetron (Zofran ODT) 4 mg disintegrating tablet Take 1 Tablet by mouth every eight (8) hours as needed for Nausea.  (Patient not taking: Reported on 8/25/2022) 20 Tablet 0    dicyclomine (BENTYL) 20 mg tablet Take 1 Tablet by mouth every six (6) hours as needed for Abdominal Cramps. 15 Tablet 0    mirabegron ER (MYRBETRIQ) 25 mg ER tablet Take 1 Tablet by mouth two (2) times a day. (Patient not taking: Reported on 8/25/2022)      lovastatin (MEVACOR) 20 mg tablet lovastatin 20 mg tablet (Patient not taking: Reported on 8/25/2022)      fluticasone propionate (FLONASE) 50 mcg/actuation nasal spray Flonase Allergy Relief 50 mcg/actuation nasal spray,suspension   Spray 1 spray every day by intranasal route. (Patient not taking: Reported on 8/31/2022)      ezetimibe (ZETIA) 10 mg tablet ezetimibe 10 mg tablet   Take 1 tablet every day by oral route. (Patient not taking: Reported on 8/25/2022)      diclofenac (VOLTAREN) 1 % gel diclofenac 1 % gel topical kit (Patient not taking: Reported on 8/25/2022)      Vitamin D3-Menaquinone 7 1000-90 unit-mcg TbDi cholecalciferol(vit D3) 1,000 unit-vit K2 90 mcg disintegrating tablet   Take by oral route. DAILY MULTI-VITAMIN PO Daily Multi-Vitamin (Patient not taking: Reported on 8/25/2022)      acetaminophen (TYLENOL ARTHRITIS PAIN PO) Take 1 Tablet by mouth as needed. RANITIDINE HCL (ZANTAC PO) Take  by mouth. (Patient not taking: Reported on 9/16/2021)      amLODIPine (NORVASC) 10 mg tablet Take  by mouth daily. losartan (COZAAR) 100 mg tablet Take 100 mg by mouth daily. omeprazole (PRILOSEC) 40 mg capsule Take 40 mg by mouth daily. (Patient not taking: No sig reported)      L.ACIDOPHILUS/B.BIFIDUM&LONGUM (HEALTHY COLON PO) Take  by mouth. (Patient not taking: Reported on 8/25/2022)      ERGOCALCIFEROL, VITAMIN D2, (VITAMIN D2 PO) Take  by mouth. (Patient not taking: Reported on 8/25/2022)      cyanocobalamin (VITAMIN B12) 500 mcg tablet Take 500 mcg by mouth daily. FOLIC ACID/MULTIVIT-MIN/LUTEIN (CENTRUM SILVER PO) Take  by mouth.  (Patient not taking: Reported on 8/31/2022) Past History     Past Medical History:  Past Medical History:   Diagnosis Date    Diverticula of colon     GERD (gastroesophageal reflux disease)     Hypertension     Joint pain        Past Surgical History:  Past Surgical History:   Procedure Laterality Date    HX CATARACT REMOVAL      HX OTHER SURGICAL      tumor on right side    HX TUMOR REMOVAL      fatty tumor upper left arm       Family History:  Family History   Problem Relation Age of Onset    Breast Cancer Mother     Diabetes Mother     Heart Disease Mother     Hypertension Mother        Social History:  Social History     Tobacco Use    Smoking status: Former     Packs/day: 0.50     Types: Cigarettes    Smokeless tobacco: Never   Vaping Use    Vaping Use: Never used   Substance Use Topics    Alcohol use: No     Alcohol/week: 0.0 standard drinks    Drug use: No       Allergies: Allergies   Allergen Reactions    Aspirin Nausea Only    Codeine Not Reported This Time         Review of Systems   Review of Systems   Constitutional:  Negative for appetite change and fever. Respiratory:  Negative for cough and shortness of breath. Cardiovascular:  Negative for chest pain. Gastrointestinal:  Negative for abdominal pain. Genitourinary:  Negative for dysuria and pelvic pain. Physical Exam   Physical Exam  Vitals reviewed. Constitutional:       General: She is not in acute distress. Appearance: She is well-developed. She is not diaphoretic. HENT:      Head: Normocephalic and atraumatic. Right Ear: External ear normal.      Left Ear: External ear normal.      Nose: Nose normal.      Mouth/Throat:      Mouth: Mucous membranes are moist.   Eyes:      General: No scleral icterus. Right eye: No discharge. Left eye: No discharge. Conjunctiva/sclera: Conjunctivae normal.      Pupils: Pupils are equal, round, and reactive to light. Neck:      Thyroid: No thyromegaly. Trachea: No tracheal deviation.    Cardiovascular: Rate and Rhythm: Normal rate and regular rhythm. Heart sounds: Normal heart sounds. No murmur heard. No friction rub. No gallop. Pulmonary:      Effort: Pulmonary effort is normal. No respiratory distress. Breath sounds: Normal breath sounds. No wheezing or rales. Chest:      Chest wall: No tenderness. Abdominal:      General: Bowel sounds are normal. There is no distension. Palpations: Abdomen is soft. Tenderness: There is no abdominal tenderness. There is no guarding or rebound. Genitourinary:     Comments: External genitalia normal.  Musculoskeletal:         General: No tenderness or deformity. Normal range of motion. Cervical back: Normal range of motion and neck supple. Skin:     General: Skin is warm and dry. Neurological:      Mental Status: She is alert and oriented to person, place, and time. Coordination: Coordination normal.      Deep Tendon Reflexes: Reflexes are normal and symmetric. Reflexes normal.       Diagnostic Study Results     Labs -     Latest Reference Range & Units 10/31/22 04:00   Color -   YELLOW/STRAW   Appearance CLEAR   CLEAR   Specific gravity 1.003 - 1.030   1.010   pH (UA) 5.0 - 8.0   5.5   Protein NEG mg/dL Negative   Glucose NEG mg/dL Negative   Ketone NEG mg/dL Negative   Blood NEG   TRACE ! Bilirubin NEG   Negative   Urobilinogen 0.2 - 1.0 EU/dL 0.2   Nitrites NEG   Negative   Leukocyte Esterase NEG   MODERATE ! Epithelial cells FEW /lpf MODERATE ! WBC 0 - 4 /hpf 10-20   RBC 0 - 5 /hpf 0-5   Bacteria NEG /hpf 1+ !   !: Data is abnormal        Medical Decision Making   I am the first provider for this patient. I reviewed the vital signs, available nursing notes, past medical history, past surgical history, family history and social history. Vital Signs-Reviewed the patient's vital signs. No data found.       Pulse Oximetry Analysis - 98% on RA      Records Reviewed: Nursing Notes and Old Medical Records    Provider Notes (Medical Decision Making):   MDM: DDx: UTI vs Perineal yeast infection vs Intrapelvic process    ED Course:   Initial assessment performed. The patients presenting problems have been discussed, and they are in agreement with the care plan formulated and outlined with them. I have encouraged them to ask questions as they arise throughout their visit. PROGRESS NOTE:  A urinalysis and culture was sent, and pt was give a first dose of her cephalexin was given. A prescription for a 7 day course of cephalexin was sent to her pharmacy. She was advised to restart her antihypertensive medication. Discharge note:  Pt re-evaluated and noted to be feeling better, ready for discharge. Updated pt on all final lab  findings. Will follow up as instructed. All questions have been answered, pt voiced understanding and agreement with plan. Abx were prescribed, pt advised that diarrhea and rash are possible side effects of the medications. Specific return precautions provided as well as instructions to return to the ED should sx worsen at any time. Vital signs stable for discharge. Critical Care Time: 0      Diagnosis     Clinical Impression:   1. Acute cystitis without hematuria    2. Hypertension, unspecified type        PLAN:  1. Discharge Medication List as of 10/31/2022  5:15 AM        2.    Follow-up Information    None       Return to ED if worse     Disposition:  Home

## 2022-10-31 NOTE — DISCHARGE INSTRUCTIONS
--Cephalexin 500 mg 3 times daily for the next 7 days. --Have your doctor recheck your blood pressure this week.

## 2022-10-31 NOTE — ED TRIAGE NOTES
Patient states the she is having super pubic pain that started at 230. Patient states she is feeling pressure and turning denies pain just states she is uncomfortable.

## 2022-11-02 LAB
BACTERIA SPEC CULT: ABNORMAL
CC UR VC: ABNORMAL
SERVICE CMNT-IMP: ABNORMAL

## 2022-11-29 ENCOUNTER — HOSPITAL ENCOUNTER (EMERGENCY)
Age: 82
Discharge: HOME | End: 2022-11-29
Attending: EMERGENCY MEDICINE
Payer: MEDICARE

## 2022-11-29 VITALS
RESPIRATION RATE: 18 BRPM | SYSTOLIC BLOOD PRESSURE: 193 MMHG | HEART RATE: 65 BPM | TEMPERATURE: 97.6 F | DIASTOLIC BLOOD PRESSURE: 90 MMHG | BODY MASS INDEX: 32.95 KG/M2 | OXYGEN SATURATION: 98 % | HEIGHT: 66 IN | WEIGHT: 205 LBS

## 2022-11-29 DIAGNOSIS — N30.00 ACUTE CYSTITIS WITHOUT HEMATURIA: ICD-10-CM

## 2022-11-29 DIAGNOSIS — R10.2 VAGINAL PAIN: Primary | ICD-10-CM

## 2022-11-29 LAB
APPEARANCE UR: CLEAR
BACTERIA URNS QL MICRO: ABNORMAL /HPF
BILIRUB UR QL: NEGATIVE
COLOR UR: ABNORMAL
EPITH CASTS URNS QL MICRO: ABNORMAL /LPF
GLUCOSE UR STRIP.AUTO-MCNC: NEGATIVE MG/DL
HGB UR QL STRIP: NEGATIVE
KETONES UR QL STRIP.AUTO: NEGATIVE MG/DL
LEUKOCYTE ESTERASE UR QL STRIP.AUTO: ABNORMAL
NITRITE UR QL STRIP.AUTO: POSITIVE
PH UR STRIP: 6 [PH] (ref 5–8)
PROT UR STRIP-MCNC: NEGATIVE MG/DL
RBC #/AREA URNS HPF: ABNORMAL /HPF (ref 0–5)
SP GR UR REFRACTOMETRY: 1.02 (ref 1–1.03)
UA: UC IF INDICATED,UAUC: ABNORMAL
UROBILINOGEN UR QL STRIP.AUTO: 0.2 EU/DL (ref 0.2–1)
WBC URNS QL MICRO: ABNORMAL /HPF (ref 0–4)

## 2022-11-29 PROCEDURE — 99283 EMERGENCY DEPT VISIT LOW MDM: CPT

## 2022-11-29 PROCEDURE — 81001 URINALYSIS AUTO W/SCOPE: CPT

## 2022-11-29 PROCEDURE — 87086 URINE CULTURE/COLONY COUNT: CPT

## 2022-11-29 PROCEDURE — 74011250637 HC RX REV CODE- 250/637: Performed by: EMERGENCY MEDICINE

## 2022-11-29 RX ORDER — PHENAZOPYRIDINE HYDROCHLORIDE 100 MG/1
200 TABLET, FILM COATED ORAL
Status: COMPLETED | OUTPATIENT
Start: 2022-11-29 | End: 2022-11-29

## 2022-11-29 RX ORDER — PHENAZOPYRIDINE HYDROCHLORIDE 200 MG/1
200 TABLET, FILM COATED ORAL 3 TIMES DAILY
Qty: 6 TABLET | Refills: 0 | Status: SHIPPED | OUTPATIENT
Start: 2022-11-29 | End: 2022-12-01

## 2022-11-29 RX ORDER — DOXYCYCLINE 100 MG/1
100 CAPSULE ORAL
Status: COMPLETED | OUTPATIENT
Start: 2022-11-29 | End: 2022-11-29

## 2022-11-29 RX ORDER — DOXYCYCLINE HYCLATE 100 MG
100 TABLET ORAL 2 TIMES DAILY
Qty: 14 TABLET | Refills: 0 | Status: SHIPPED | OUTPATIENT
Start: 2022-11-29 | End: 2022-12-06

## 2022-11-29 RX ADMIN — PHENAZOPYRIDINE HYDROCHLORIDE 200 MG: 100 TABLET ORAL at 04:50

## 2022-11-29 RX ADMIN — DOXYCYCLINE 100 MG: 100 CAPSULE ORAL at 05:57

## 2022-11-29 NOTE — ED PROVIDER NOTES
EMERGENCY DEPARTMENT HISTORY AND PHYSICAL EXAM      Date: 11/29/2022  Patient Name: Hollie Fernandez      Diagnosis     Clinical Impression:   1. Vaginal pain    2. Acute cystitis without hematuria                    History of Presenting Illness     Chief Complaint   Patient presents with    Urinary Pain     Bladder problem       History Provided By: Patient    HPI:   The history is provided by the patient. Urinary Pain   This is a new problem. The current episode started 1 to 2 hours ago. The problem occurs every urination. The problem has not changed since onset. The quality of the pain is described as aching. The pain is moderate. There has been no fever. Associated symptoms include frequency and urgency. Pertinent negatives include no chills, no nausea, no vomiting, no discharge, no hematuria, no flank pain, no vaginal discharge, no abdominal pain and no back pain. The patient is not pregnant. She has tried nothing for the symptoms. The treatment provided no relief. Her past medical history is significant for recurrent UTIs. Her past medical history does not include kidney stones. Hollie Fernandez, 81 y.o. female  presents to the ED with cc of pressure in the vaginal area that started around 2:30 AM, she has had a urinary tract infection in the past and had similar symptoms 1 month ago was seen here found to have suspected UTI and was treated for this. She also reports symptoms 2 weeks ago and reports she was given Pyridium which improved her symptoms. She denies any abdominal pain nausea vomiting diarrhea. Reports is a pressure sensation and feels like her organs are trying to come out of her vaginal area. She denies any vaginal bleeding discharge denies any flank or back pain denies fevers chills. Denies any history of vaginal prolapse or bladder prolapse.   She reports compliance with her blood pressure medications, blood pressure is noted to be elevated patient is asymptomatic regarding her blood pressure    There are no other complaints, changes, or physical findings at this time. PCP: Dana Prado    No current facility-administered medications on file prior to encounter. Current Outpatient Medications on File Prior to Encounter   Medication Sig Dispense Refill    donepeziL (ARICEPT) 5 mg tablet Take 5mg daily for 30 days then increase to 10mg daily (Patient not taking: Reported on 8/25/2022)      terazosin (HYTRIN) 2 mg capsule terazosin 2 mg capsule   Take 1 capsule every day by oral route. hydroCHLOROthiazide (MICROZIDE) 12.5 mg capsule hydrochlorothiazide 12.5 mg capsule   Take 1 capsule every day by oral route. (Patient not taking: Reported on 8/25/2022)      polyethylene glycol (Miralax) 17 gram/dose powder Take 17 g by mouth daily. 1 tablespoon with 8 oz of water daily (Patient not taking: Reported on 8/25/2022) 507 g 0    dicyclomine (BENTYL) 20 mg tablet Take 1 Tablet by mouth every six (6) hours as needed for Abdominal Cramps. 15 Tablet 0    ondansetron (Zofran ODT) 4 mg disintegrating tablet Take 1 Tablet by mouth every eight (8) hours as needed for Nausea. (Patient not taking: Reported on 8/25/2022) 20 Tablet 0    dicyclomine (BENTYL) 20 mg tablet Take 1 Tablet by mouth every six (6) hours as needed for Abdominal Cramps. 15 Tablet 0    mirabegron ER (MYRBETRIQ) 25 mg ER tablet Take 1 Tablet by mouth two (2) times a day. (Patient not taking: Reported on 8/25/2022)      lovastatin (MEVACOR) 20 mg tablet lovastatin 20 mg tablet (Patient not taking: Reported on 8/25/2022)      fluticasone propionate (FLONASE) 50 mcg/actuation nasal spray Flonase Allergy Relief 50 mcg/actuation nasal spray,suspension   Spray 1 spray every day by intranasal route. (Patient not taking: Reported on 8/31/2022)      ezetimibe (ZETIA) 10 mg tablet ezetimibe 10 mg tablet   Take 1 tablet every day by oral route.  (Patient not taking: Reported on 8/25/2022)      diclofenac (VOLTAREN) 1 % gel diclofenac 1 % gel topical kit (Patient not taking: Reported on 8/25/2022)      Vitamin D3-Menaquinone 7 1000-90 unit-mcg TbDi cholecalciferol(vit D3) 1,000 unit-vit K2 90 mcg disintegrating tablet   Take by oral route. DAILY MULTI-VITAMIN PO Daily Multi-Vitamin (Patient not taking: Reported on 8/25/2022)      acetaminophen (TYLENOL ARTHRITIS PAIN PO) Take 1 Tablet by mouth as needed. RANITIDINE HCL (ZANTAC PO) Take  by mouth. (Patient not taking: Reported on 9/16/2021)      amLODIPine (NORVASC) 10 mg tablet Take  by mouth daily. losartan (COZAAR) 100 mg tablet Take 100 mg by mouth daily. omeprazole (PRILOSEC) 40 mg capsule Take 40 mg by mouth daily. (Patient not taking: No sig reported)      L.ACIDOPHILUS/B.BIFIDUM&LONGUM (HEALTHY COLON PO) Take  by mouth. (Patient not taking: Reported on 8/25/2022)      ERGOCALCIFEROL, VITAMIN D2, (VITAMIN D2 PO) Take  by mouth. (Patient not taking: Reported on 8/25/2022)      cyanocobalamin (VITAMIN B12) 500 mcg tablet Take 500 mcg by mouth daily. FOLIC ACID/MULTIVIT-MIN/LUTEIN (CENTRUM SILVER PO) Take  by mouth.  (Patient not taking: Reported on 8/31/2022)         Past History     Past Medical History:  Past Medical History:   Diagnosis Date    Diverticula of colon     GERD (gastroesophageal reflux disease)     Hypertension     Joint pain        Past Surgical History:  Past Surgical History:   Procedure Laterality Date    HX CATARACT REMOVAL      HX OTHER SURGICAL      tumor on right side    HX TUMOR REMOVAL      fatty tumor upper left arm       Family History:  Family History   Problem Relation Age of Onset    Breast Cancer Mother     Diabetes Mother     Heart Disease Mother     Hypertension Mother        Social History:  Social History     Socioeconomic History    Marital status:    Tobacco Use    Smoking status: Former     Packs/day: 0.50     Types: Cigarettes    Smokeless tobacco: Never   Vaping Use    Vaping Use: Never used   Substance and Sexual Activity Alcohol use: No     Alcohol/week: 0.0 standard drinks    Drug use: No    Sexual activity: Not Currently     Partners: Male       Allergies: Allergies   Allergen Reactions    Aspirin Nausea Only    Codeine Not Reported This Time           Social Determinants of Health     Tobacco Use: Medium Risk    Smoking Tobacco Use: Former    Smokeless Tobacco Use: Never    Passive Exposure: Not on file   Alcohol Use: Not on file   Financial Resource Strain: Not on file   Food Insecurity: Not on file   Transportation Needs: Not on file   Physical Activity: Not on file   Stress: Not on file   Social Connections: Not on file   Intimate Partner Violence: Not on file   Depression: Not at risk    PHQ-2 Score: 0   Housing Stability: Not on file     Social Connections: Not on file             Review of Systems   Review of Systems   Constitutional: Negative. Negative for activity change, appetite change, chills and fever. HENT: Negative. Negative for congestion and sore throat. Eyes: Negative. Negative for discharge and redness. Respiratory: Negative. Negative for cough and shortness of breath. Cardiovascular: Negative. Negative for chest pain and palpitations. Gastrointestinal: Negative. Negative for abdominal pain, diarrhea, nausea and vomiting. Endocrine: Negative. Negative for polydipsia and polyuria. Genitourinary:  Positive for dysuria, frequency and urgency. Negative for difficulty urinating, flank pain, hematuria, vaginal bleeding and vaginal discharge. Musculoskeletal: Negative. Negative for arthralgias, back pain and neck pain. Skin: Negative. Negative for rash and wound. Allergic/Immunologic: Negative. Neurological: Negative. Negative for dizziness and headaches. Hematological: Negative. Negative for adenopathy. Does not bruise/bleed easily. Psychiatric/Behavioral: Negative. Negative for confusion. The patient is not nervous/anxious.     All other systems reviewed and are negative. Physical Exam   Physical Exam  Vitals and nursing note reviewed. Constitutional:       General: She is not in acute distress. Appearance: Normal appearance. She is well-developed. She is not ill-appearing, toxic-appearing or diaphoretic. Comments: Female standing in triage in no acute distress   HENT:      Head: Normocephalic and atraumatic. Nose: Nose normal.      Mouth/Throat:      Mouth: Mucous membranes are moist.      Pharynx: Oropharynx is clear. Eyes:      Extraocular Movements: Extraocular movements intact. Conjunctiva/sclera: Conjunctivae normal.      Pupils: Pupils are equal, round, and reactive to light. Cardiovascular:      Rate and Rhythm: Normal rate and regular rhythm. Pulses: Normal pulses. Pulmonary:      Effort: Pulmonary effort is normal. No respiratory distress. Abdominal:      General: There is no distension. Palpations: Abdomen is soft. There is no mass. Tenderness: There is no abdominal tenderness. There is no guarding or rebound. Hernia: No hernia is present. Musculoskeletal:         General: No swelling or tenderness. Normal range of motion. Cervical back: Normal range of motion and neck supple. No rigidity. No muscular tenderness. Skin:     General: Skin is warm and dry. Capillary Refill: Capillary refill takes less than 2 seconds. Findings: No erythema or rash. Neurological:      General: No focal deficit present. Mental Status: She is alert and oriented to person, place, and time. Mental status is at baseline. Cranial Nerves: No cranial nerve deficit. Sensory: No sensory deficit.    Psychiatric:         Mood and Affect: Mood normal.         Behavior: Behavior normal.         Diagnostic Study Results     Labs -     Recent Results (from the past 12 hour(s))   URINALYSIS W/ REFLEX CULTURE    Collection Time: 11/29/22  4:20 AM    Specimen: Urine   Result Value Ref Range    Color YELLOW/STRAW Appearance CLEAR CLEAR      Specific gravity 1.025 1.003 - 1.030      pH (UA) 6.0 5.0 - 8.0      Protein Negative NEG mg/dL    Glucose Negative NEG mg/dL    Ketone Negative NEG mg/dL    Bilirubin Negative NEG      Blood Negative NEG      Urobilinogen 0.2 0.2 - 1.0 EU/dL    Nitrites Positive (A) NEG      Leukocyte Esterase TRACE (A) NEG      WBC 5-10 0 - 4 /hpf    RBC 5-10 0 - 5 /hpf    Epithelial cells MODERATE (A) FEW /lpf    Bacteria 2+ (A) NEG /hpf    UA:UC IF INDICATED CULTURE NOT INDICATED BY UA RESULT CNI         Radiologic Studies -   No orders to display     CT Results  (Last 48 hours)      None          CXR Results  (Last 48 hours)      None            Medical Decision Making   I am the first provider for this patient. I reviewed the vital signs, available nursing notes, past medical history, past surgical history, family history and social history. Vital Signs-Reviewed the patient's vital signs. Patient Vitals for the past 12 hrs:   Temp Pulse Resp BP SpO2   11/29/22 0416 97.6 °F (36.4 °C) 66 20 (!) 219/98 99 %               Records Reviewed: Nursing Notes, Old Medical Records, and Previous Laboratory Studies    Provider Notes (Medical Decision Making):   Presents complaining of vaginal pressure pain history of UTI, will check urinalysis she has a benign abdominal exam.  Blood pressure is elevated, will give her medications for her symptoms and reassess her blood pressure. ED Course:   Initial assessment performed. The patients presenting problems have been discussed, and they are in agreement with the care plan formulated and outlined with them. I have encouraged them to ask questions as they arise throughout their visit. ED Course as of 11/29/22 0539   Tue Nov 29, 2022   7844 Reviewed urine results with patient plan for antibiotics need for follow-up with PCP plan for urine culture.  [MF]      ED Course User Index  [MF] Sanjuanita Reza MD         Medications Given in the ED:    Medications doxycycline (MONODOX) capsule 100 mg (has no administration in time range)   phenazopyridine (PYRIDIUM) tablet 200 mg (200 mg Oral Given 11/29/22 0450)           5:38 AM    Presents with pelvic pain history UTIs, urinalysis is nitrate positive leukocyte positive with few white cells and bacteria, there is some contamination so we will send for culture to confirm an infection. We will give her doxycycline to take for antibiotic give her course of Pyridium as she reports this helped instructed to take Tylenol Motrin as needed and see her PCP for follow-up in the next 2 to 3 days for reevaluation return here for change or worsening of symptoms. Patient is also instructed to continue her current blood pressure medicine and have her PCP recheck her blood pressure. Pt has been re-examined and states that they are feeling better and have no new complaints. Laboratory tests, medications, diagnosis, follow up plan and return instructions have been reviewed and discussed with the patient and/or family. Pt and/or family were instructed on symptoms that may arise after discharge requiring re-evaluation by a physician. Pt and/or family have had the opportunity to ask questions about their care. Patient and/or family verbalized understanding and agreement with care plan, follow up and return instructions. Patient and/or family agree to return in 50 hours if their symptoms are not improving or immediately if they have any change in their condition. Abx were prescribed, pt advised that diarrhea and rash are possible side effects of the medications. I have also put together some discharge instructions for patient that include: 1) educational information regarding their diagnosis, 2) how to care for their diagnosis at home, as well a 3) list of reasons why they would want to return to the ED prior to their follow-up appointment, should their condition change.        Yessi Robles, MD      Procedures            Disposition:  Discharged          DISCHARGE PLAN:  1. Current Discharge Medication List        START taking these medications    Details   doxycycline (VIBRA-TABS) 100 mg tablet Take 1 Tablet by mouth two (2) times a day for 7 days. Qty: 14 Tablet, Refills: 0  Start date: 11/29/2022, End date: 12/6/2022      phenazopyridine (Pyridium) 200 mg tablet Take 1 Tablet by mouth three (3) times daily for 6 doses. Qty: 6 Tablet, Refills: 0  Start date: 11/29/2022, End date: 12/1/2022           2. Follow-up Information       Follow up With Specialties Details Why Contact Info    [place], [facility] Schedule an appointment as soon as possible for a visit in 2 days For follow up [address] [place] [address] [phone]            3. Return to ED if worse       Attestations: Bay Vogt MD    Please note that this dictation was completed with [employer], the computer voice recognition software. Quite often unanticipated grammatical, syntax, homophones, and other interpretive errors are inadvertently transcribed by the computer software. Please disregard these errors. Please excuse any errors that have escaped final proofreading. Thank you.

## 2022-11-30 LAB
BACTERIA SPEC CULT: NORMAL
CC UR VC: NORMAL
SERVICE CMNT-IMP: NORMAL

## 2023-01-02 ENCOUNTER — APPOINTMENT (OUTPATIENT)
Dept: GENERAL RADIOLOGY | Age: 83
End: 2023-01-02
Attending: EMERGENCY MEDICINE
Payer: MEDICARE

## 2023-01-02 ENCOUNTER — APPOINTMENT (OUTPATIENT)
Dept: CT IMAGING | Age: 83
End: 2023-01-02
Attending: EMERGENCY MEDICINE
Payer: MEDICARE

## 2023-01-02 ENCOUNTER — HOSPITAL ENCOUNTER (OUTPATIENT)
Age: 83
Setting detail: OBSERVATION
Discharge: HOME | End: 2023-01-04
Attending: EMERGENCY MEDICINE | Admitting: INTERNAL MEDICINE
Payer: MEDICARE

## 2023-01-02 DIAGNOSIS — R55 SYNCOPE, UNSPECIFIED SYNCOPE TYPE: Primary | ICD-10-CM

## 2023-01-02 DIAGNOSIS — N17.9 AKI (ACUTE KIDNEY INJURY) (HCC): ICD-10-CM

## 2023-01-02 LAB
ALBUMIN SERPL-MCNC: 3.2 G/DL (ref 3.5–5)
ALBUMIN/GLOB SERPL: 1 {RATIO} (ref 1.1–2.2)
ALP SERPL-CCNC: 95 U/L (ref 45–117)
ALT SERPL-CCNC: 16 U/L (ref 12–78)
ANION GAP SERPL CALC-SCNC: 8 MMOL/L (ref 5–15)
APPEARANCE UR: CLEAR
AST SERPL-CCNC: 15 U/L (ref 15–37)
BASOPHILS # BLD: 0 K/UL (ref 0–0.1)
BASOPHILS NFR BLD: 0 % (ref 0–1)
BILIRUB SERPL-MCNC: 0.3 MG/DL (ref 0.2–1)
BILIRUB UR QL: NEGATIVE
BUN SERPL-MCNC: 25 MG/DL (ref 6–20)
BUN/CREAT SERPL: 14 (ref 12–20)
CALCIUM SERPL-MCNC: 9.1 MG/DL (ref 8.5–10.1)
CHLORIDE SERPL-SCNC: 102 MMOL/L (ref 97–108)
CO2 SERPL-SCNC: 28 MMOL/L (ref 21–32)
COLOR UR: NORMAL
CREAT SERPL-MCNC: 1.84 MG/DL (ref 0.55–1.02)
D DIMER PPP FEU-MCNC: 2.06 MG/L FEU (ref 0–0.65)
DIFFERENTIAL METHOD BLD: ABNORMAL
EOSINOPHIL # BLD: 0.1 K/UL (ref 0–0.4)
EOSINOPHIL NFR BLD: 1 % (ref 0–7)
ERYTHROCYTE [DISTWIDTH] IN BLOOD BY AUTOMATED COUNT: 16.7 % (ref 11.5–14.5)
GLOBULIN SER CALC-MCNC: 3.1 G/DL (ref 2–4)
GLUCOSE SERPL-MCNC: 223 MG/DL (ref 65–100)
GLUCOSE UR STRIP.AUTO-MCNC: NEGATIVE MG/DL
HCT VFR BLD AUTO: 34 % (ref 35–47)
HEMOCCULT STL QL: NEGATIVE
HGB BLD-MCNC: 10.3 G/DL (ref 11.5–16)
HGB UR QL STRIP: NEGATIVE
IMM GRANULOCYTES # BLD AUTO: 0 K/UL (ref 0–0.04)
IMM GRANULOCYTES NFR BLD AUTO: 0 % (ref 0–0.5)
KETONES UR QL STRIP.AUTO: NEGATIVE MG/DL
LEUKOCYTE ESTERASE UR QL STRIP.AUTO: NEGATIVE
LYMPHOCYTES # BLD: 1.7 K/UL (ref 0.8–3.5)
LYMPHOCYTES NFR BLD: 31 % (ref 12–49)
MAGNESIUM SERPL-MCNC: 1.7 MG/DL (ref 1.6–2.4)
MCH RBC QN AUTO: 24.1 PG (ref 26–34)
MCHC RBC AUTO-ENTMCNC: 30.3 G/DL (ref 30–36.5)
MCV RBC AUTO: 79.4 FL (ref 80–99)
MONOCYTES # BLD: 0.3 K/UL (ref 0–1)
MONOCYTES NFR BLD: 6 % (ref 5–13)
NEUTS SEG # BLD: 3.4 K/UL (ref 1.8–8)
NEUTS SEG NFR BLD: 61 % (ref 32–75)
NITRITE UR QL STRIP.AUTO: NEGATIVE
NRBC # BLD: 0 K/UL (ref 0–0.01)
NRBC BLD-RTO: 0 PER 100 WBC
PH UR STRIP: 6 [PH] (ref 5–8)
PLATELET # BLD AUTO: 221 K/UL (ref 150–400)
PMV BLD AUTO: 10 FL (ref 8.9–12.9)
POTASSIUM SERPL-SCNC: 3.7 MMOL/L (ref 3.5–5.1)
PROT SERPL-MCNC: 6.3 G/DL (ref 6.4–8.2)
PROT UR STRIP-MCNC: NEGATIVE MG/DL
RBC # BLD AUTO: 4.28 M/UL (ref 3.8–5.2)
SODIUM SERPL-SCNC: 138 MMOL/L (ref 136–145)
SP GR UR REFRACTOMETRY: 1.01 (ref 1–1.03)
TROPONIN-HIGH SENSITIVITY: 8 NG/L (ref 0–51)
UROBILINOGEN UR QL STRIP.AUTO: 0.2 EU/DL (ref 0.2–1)
WBC # BLD AUTO: 5.6 K/UL (ref 3.6–11)

## 2023-01-02 PROCEDURE — 83735 ASSAY OF MAGNESIUM: CPT

## 2023-01-02 PROCEDURE — 96360 HYDRATION IV INFUSION INIT: CPT

## 2023-01-02 PROCEDURE — 77030038269 HC DRN EXT URIN PURWCK BARD -A

## 2023-01-02 PROCEDURE — 74011250636 HC RX REV CODE- 250/636: Performed by: EMERGENCY MEDICINE

## 2023-01-02 PROCEDURE — 96361 HYDRATE IV INFUSION ADD-ON: CPT

## 2023-01-02 PROCEDURE — 99285 EMERGENCY DEPT VISIT HI MDM: CPT

## 2023-01-02 PROCEDURE — 81003 URINALYSIS AUTO W/O SCOPE: CPT

## 2023-01-02 PROCEDURE — 70450 CT HEAD/BRAIN W/O DYE: CPT

## 2023-01-02 PROCEDURE — 82272 OCCULT BLD FECES 1-3 TESTS: CPT

## 2023-01-02 PROCEDURE — 85025 COMPLETE CBC W/AUTO DIFF WBC: CPT

## 2023-01-02 PROCEDURE — 36415 COLL VENOUS BLD VENIPUNCTURE: CPT

## 2023-01-02 PROCEDURE — 71045 X-RAY EXAM CHEST 1 VIEW: CPT

## 2023-01-02 PROCEDURE — 84484 ASSAY OF TROPONIN QUANT: CPT

## 2023-01-02 PROCEDURE — 93005 ELECTROCARDIOGRAM TRACING: CPT

## 2023-01-02 PROCEDURE — 80053 COMPREHEN METABOLIC PANEL: CPT

## 2023-01-02 PROCEDURE — G0378 HOSPITAL OBSERVATION PER HR: HCPCS

## 2023-01-02 PROCEDURE — 85379 FIBRIN DEGRADATION QUANT: CPT

## 2023-01-02 RX ORDER — SODIUM CHLORIDE 0.9 % (FLUSH) 0.9 %
5-10 SYRINGE (ML) INJECTION ONCE
Status: DISPENSED | OUTPATIENT
Start: 2023-01-02 | End: 2023-01-03

## 2023-01-02 RX ORDER — AMLODIPINE BESYLATE 10 MG/1
10 TABLET ORAL DAILY
Status: DISCONTINUED | OUTPATIENT
Start: 2023-01-03 | End: 2023-01-04 | Stop reason: HOSPADM

## 2023-01-02 RX ORDER — SODIUM CHLORIDE 9 MG/ML
125 INJECTION, SOLUTION INTRAVENOUS CONTINUOUS
Status: DISCONTINUED | OUTPATIENT
Start: 2023-01-02 | End: 2023-01-04

## 2023-01-02 RX ORDER — ONDANSETRON 2 MG/ML
4 INJECTION INTRAMUSCULAR; INTRAVENOUS
Status: DISCONTINUED | OUTPATIENT
Start: 2023-01-02 | End: 2023-01-03

## 2023-01-02 RX ORDER — ACETAMINOPHEN 325 MG/1
650 TABLET ORAL
Status: DISCONTINUED | OUTPATIENT
Start: 2023-01-02 | End: 2023-01-04 | Stop reason: HOSPADM

## 2023-01-02 RX ADMIN — SODIUM CHLORIDE 1000 ML: 9 INJECTION, SOLUTION INTRAVENOUS at 21:34

## 2023-01-02 RX ADMIN — SODIUM CHLORIDE 1000 ML: 9 INJECTION, SOLUTION INTRAVENOUS at 20:03

## 2023-01-02 RX ADMIN — SODIUM CHLORIDE 125 ML/HR: 9 INJECTION, SOLUTION INTRAVENOUS at 23:00

## 2023-01-03 ENCOUNTER — APPOINTMENT (OUTPATIENT)
Dept: ULTRASOUND IMAGING | Age: 83
End: 2023-01-03
Attending: INTERNAL MEDICINE
Payer: MEDICARE

## 2023-01-03 LAB
ABO + RH BLD: NORMAL
ANION GAP SERPL CALC-SCNC: 7 MMOL/L (ref 5–15)
BLOOD GROUP ANTIBODIES SERPL: NORMAL
BUN SERPL-MCNC: 20 MG/DL (ref 6–20)
BUN/CREAT SERPL: 14 (ref 12–20)
CALCIUM SERPL-MCNC: 8.3 MG/DL (ref 8.5–10.1)
CHLORIDE SERPL-SCNC: 110 MMOL/L (ref 97–108)
CO2 SERPL-SCNC: 28 MMOL/L (ref 21–32)
CREAT SERPL-MCNC: 1.42 MG/DL (ref 0.55–1.02)
GLUCOSE SERPL-MCNC: 111 MG/DL (ref 65–100)
LEFT CCA DIST DIAS: 13.7 CM/S
LEFT CCA DIST SYS: 95 CM/S
LEFT CCA PROX DIAS: 12.5 CM/S
LEFT CCA PROX SYS: 100.7 CM/S
LEFT ECA DIAS: 2.97 CM/S
LEFT ECA SYS: 128 CM/S
LEFT ICA DIST DIAS: 19.7 CM/S
LEFT ICA DIST SYS: 93.8 CM/S
LEFT ICA MID DIAS: 17.9 CM/S
LEFT ICA MID SYS: 113.6 CM/S
LEFT ICA PROX DIAS: 10.8 CM/S
LEFT ICA PROX SYS: 63.9 CM/S
LEFT ICA/CCA SYS: 1.2 NO UNITS
LEFT VERTEBRAL DIAS: 13.97 CM/S
LEFT VERTEBRAL SYS: 60.5 CM/S
POTASSIUM SERPL-SCNC: 3.8 MMOL/L (ref 3.5–5.1)
RIGHT CCA DIST DIAS: 11.1 CM/S
RIGHT CCA DIST SYS: 79 CM/S
RIGHT CCA PROX DIAS: 11.4 CM/S
RIGHT CCA PROX SYS: 87.2 CM/S
RIGHT ECA DIAS: 6.74 CM/S
RIGHT ECA SYS: 162.4 CM/S
RIGHT ICA DIST DIAS: 16.4 CM/S
RIGHT ICA DIST SYS: 87 CM/S
RIGHT ICA MID DIAS: 30 CM/S
RIGHT ICA MID SYS: 164.6 CM/S
RIGHT ICA PROX DIAS: 32.3 CM/S
RIGHT ICA PROX SYS: 171.6 CM/S
RIGHT ICA/CCA SYS: 2.2 NO UNITS
RIGHT VERTEBRAL DIAS: 12.76 CM/S
RIGHT VERTEBRAL SYS: 95.1 CM/S
SODIUM SERPL-SCNC: 145 MMOL/L (ref 136–145)
SPECIMEN EXP DATE BLD: NORMAL

## 2023-01-03 PROCEDURE — 74011000250 HC RX REV CODE- 250: Performed by: INTERNAL MEDICINE

## 2023-01-03 PROCEDURE — 93880 EXTRACRANIAL BILAT STUDY: CPT

## 2023-01-03 PROCEDURE — 94760 N-INVAS EAR/PLS OXIMETRY 1: CPT

## 2023-01-03 PROCEDURE — 86900 BLOOD TYPING SEROLOGIC ABO: CPT

## 2023-01-03 PROCEDURE — 36415 COLL VENOUS BLD VENIPUNCTURE: CPT

## 2023-01-03 PROCEDURE — 74011250636 HC RX REV CODE- 250/636: Performed by: INTERNAL MEDICINE

## 2023-01-03 PROCEDURE — 80048 BASIC METABOLIC PNL TOTAL CA: CPT

## 2023-01-03 PROCEDURE — 74011250637 HC RX REV CODE- 250/637: Performed by: INTERNAL MEDICINE

## 2023-01-03 PROCEDURE — G0378 HOSPITAL OBSERVATION PER HR: HCPCS

## 2023-01-03 PROCEDURE — 74011250637 HC RX REV CODE- 250/637: Performed by: EMERGENCY MEDICINE

## 2023-01-03 PROCEDURE — 74011250636 HC RX REV CODE- 250/636: Performed by: EMERGENCY MEDICINE

## 2023-01-03 RX ORDER — DONEPEZIL HYDROCHLORIDE 5 MG/1
5 TABLET, FILM COATED ORAL
Status: DISCONTINUED | OUTPATIENT
Start: 2023-01-03 | End: 2023-01-04 | Stop reason: HOSPADM

## 2023-01-03 RX ORDER — SODIUM CHLORIDE 0.9 % (FLUSH) 0.9 %
5-40 SYRINGE (ML) INJECTION AS NEEDED
Status: DISCONTINUED | OUTPATIENT
Start: 2023-01-03 | End: 2023-01-04 | Stop reason: HOSPADM

## 2023-01-03 RX ORDER — PANTOPRAZOLE SODIUM 40 MG/1
40 TABLET, DELAYED RELEASE ORAL
Status: DISCONTINUED | OUTPATIENT
Start: 2023-01-04 | End: 2023-01-04 | Stop reason: HOSPADM

## 2023-01-03 RX ORDER — ENOXAPARIN SODIUM 100 MG/ML
40 INJECTION SUBCUTANEOUS DAILY
Status: DISCONTINUED | OUTPATIENT
Start: 2023-01-03 | End: 2023-01-04 | Stop reason: HOSPADM

## 2023-01-03 RX ORDER — POLYETHYLENE GLYCOL 3350 17 G/17G
17 POWDER, FOR SOLUTION ORAL DAILY PRN
Status: DISCONTINUED | OUTPATIENT
Start: 2023-01-03 | End: 2023-01-04 | Stop reason: HOSPADM

## 2023-01-03 RX ORDER — SODIUM CHLORIDE 0.9 % (FLUSH) 0.9 %
5-40 SYRINGE (ML) INJECTION EVERY 8 HOURS
Status: DISCONTINUED | OUTPATIENT
Start: 2023-01-03 | End: 2023-01-04 | Stop reason: HOSPADM

## 2023-01-03 RX ORDER — ONDANSETRON 4 MG/1
4 TABLET, ORALLY DISINTEGRATING ORAL
Status: DISCONTINUED | OUTPATIENT
Start: 2023-01-03 | End: 2023-01-04 | Stop reason: HOSPADM

## 2023-01-03 RX ORDER — ACETAMINOPHEN 650 MG/1
650 SUPPOSITORY RECTAL
Status: DISCONTINUED | OUTPATIENT
Start: 2023-01-03 | End: 2023-01-04 | Stop reason: HOSPADM

## 2023-01-03 RX ORDER — ONDANSETRON 2 MG/ML
4 INJECTION INTRAMUSCULAR; INTRAVENOUS
Status: DISCONTINUED | OUTPATIENT
Start: 2023-01-03 | End: 2023-01-04 | Stop reason: HOSPADM

## 2023-01-03 RX ORDER — ACETAMINOPHEN 325 MG/1
650 TABLET ORAL
Status: DISCONTINUED | OUTPATIENT
Start: 2023-01-03 | End: 2023-01-04 | Stop reason: HOSPADM

## 2023-01-03 RX ADMIN — SODIUM CHLORIDE 125 ML/HR: 9 INJECTION, SOLUTION INTRAVENOUS at 07:43

## 2023-01-03 RX ADMIN — DONEPEZIL HYDROCHLORIDE 5 MG: 5 TABLET, FILM COATED ORAL at 22:08

## 2023-01-03 RX ADMIN — AMLODIPINE BESYLATE 10 MG: 10 TABLET ORAL at 09:06

## 2023-01-03 RX ADMIN — ACETAMINOPHEN 650 MG: 325 TABLET, FILM COATED ORAL at 22:23

## 2023-01-03 RX ADMIN — SODIUM CHLORIDE 125 ML/HR: 9 INJECTION, SOLUTION INTRAVENOUS at 16:28

## 2023-01-03 RX ADMIN — SODIUM CHLORIDE, PRESERVATIVE FREE 10 ML: 5 INJECTION INTRAVENOUS at 22:08

## 2023-01-03 RX ADMIN — ENOXAPARIN SODIUM 40 MG: 100 INJECTION SUBCUTANEOUS at 10:11

## 2023-01-03 NOTE — ED NOTES
TRANSFER - OUT REPORT:    Verbal report given to [facility] ([place] [address] on [address]  being transferred to [facility]-surg 114 for routine progression of care       Report consisted of patients Situation, Background, Assessment and   Recommendations(SBAR). Information from the following report(s) SBAR, Kardex, ED Summary, Intake/Output, MAR, Recent Results and Med Rec Status was reviewed with the receiving nurse. Lines:   Peripheral IV 01/02/23 Right Antecubital (Active)   Site Assessment Clean, dry, & intact 01/02/23 1952   Phlebitis Assessment 0 01/02/23 1952   Infiltration Assessment 0 01/02/23 1952   Dressing Status Clean, dry, & intact 01/02/23 1952        Opportunity for questions and clarification was provided.       Patient transported with:   Registered Nurse

## 2023-01-03 NOTE — PROGRESS NOTES
Pharmacist Review and Automatic Dose Adjustment of Prophylactic Enoxaparin    *Review reason for admission/hospital problem list*    The reviewing pharmacist has made an adjustment to the ordered enoxaparin dose or converted to UFH per the approved [facility] protocol and table as identified below. Jules Ochoa is a 81 y.o. female. No lab exists for component: CREATININE    Estimated Creatinine Clearance: 37.7 mL/min (A) (based on SCr of 1.42 mg/dL (H)). Height:   Ht Readings from Last 1 Encounters:   01/02/23 170.2 cm (67\")     Weight:  Wt Readings from Last 1 Encounters:   01/03/23 99.7 kg (219 lb 12.8 oz)               Plan: Based upon the patient's weight (99.7 kg) and renal function (CrCl ~ 37.7 ml/min), the ordered enoxaparin dose of 30 mg Lovenox SQ daily has been changed to 40 mg SQ daily      Thank you,  Yulia MALDONADO. Ph

## 2023-01-03 NOTE — PROGRESS NOTES
Care Management Interventions  PCP Verified by CM: Yes (Dr. Benji Fabian MD)  Last Visit to PCP: 12/30/22  Palliative Care Criteria Met (RRAT>21 & CHF Dx)?: No  Mode of Transport at Discharge: Other (see comment) (POV/Dtr)  Transition of Care Consult (CM Consult): Discharge Planning  MyChart Signup: No  Discharge Durable Medical Equipment: No  Physical Therapy Consult: Yes  Occupational Therapy Consult: Yes  Speech Therapy Consult: No  Support Systems: Child(nicole), Spouse/Significant Other  Confirm Follow Up Transport: Self   Resource Information Provided?: No  Discharge Location  Patient Expects to be Discharged to[de-identified] Home     Mrs. Deshpande was admitted under Observation status 1/2/23 with MELONIE. The SIDDIQUI was explained/signed/received: copy to patient, copy to chart. The CM introductry letter with contact information was given. Mrs. Deshpande lives in [place] with her spouse; she noted that he was older and that she would want her daughter Louie Alvarenga to be the primary healthcare decision maker. I gave Mrs. Deshpande the document and the explanatory brochure Your Right to Decide and briefly described the content. Mrs. Gloria Cruz is independent: manages her own ADLs, is able to drive, uses no DME. No needs are anticipated per discharge planning. Her daughter will be able to transport her home at discharge. Mrs. Gloria Cruz is in a Medicare plan, no supplement. I advised her of the [employer] Program should she decide she wants to apply. She has the contact information for CM and I also gave her the billing office number. Advance Care Planning     General Advance Care Planning (ACP) Conversation      Date of Conversation: 01/03/23    Conducted with: Patient with Decision Making Capacity    Healthcare Decision Maker:   No healthcare decision makers have been documented.    Click here to complete [address] including selection of the Healthcare Decision Maker Relationship (ie \"Primary\")      Content/Action Overview:   Has NO ACP documents/care preferences - information provided, considering goals and options  Reviewed DNR/DNI and patient elects Full Code (Attempt Resuscitation)      Length of Voluntary ACP Conversation in minutes:  <16 minutes (Non-Billable)    Lashell Veliz              Reason for Admission:   MELONIE                     RUR Score:   N/A OBS    RRAT: 5 LOW                  Plan for utilizing home health: NO        PCP: First and Last name:  Raymond Vergara     Name of Practice: [address] [place] [address]   Are you a current patient: Yes/No: YES   Approximate date of last visit: 12/30/22   Can you participate in a virtual visit with your PCP: YES                    Current Advanced Directive/Advance Care Plan: Full Code      Healthcare Decision Maker:   Click here to complete [address] including selection of the Healthcare Decision Maker Relationship (ie \"Primary\")                             Transition of Care Plan:   HOME

## 2023-01-03 NOTE — PROGRESS NOTES
IDR Team; MD, Care Manager , Nursing, [address] [address], Dietitian, Pharmacy and Therapy, met to review patient's plan of care. Discussed goals, interventions, barriers and progress. RUR:  NA  Observation        Team will continue to monitor progress and report any concerns to the physician and care management as indicated. Transition of Care Plan:    Tentative discharge set for tomorrow, 1/4/23. Will check with the patient to see if there is a need for home health at discharge. Patient has [facility] Medicare so will have to find an agency that accepts that insurance.

## 2023-01-03 NOTE — ED TRIAGE NOTES
Pt arrived EMS with c/o syncopal episode lasting approx 1 minute while eating dinner. Pt states she remembers pushing plates out of her way and bringing her head down to rest it on table. Pt A&Ox4 on arrival and is able to stand at bedside. Pt able to answer triage questions and has no c/o pain at this time.

## 2023-01-03 NOTE — PROGRESS NOTES
Verbal report was given on patient by Ángel Arteaga RN. Report was received in [facility]. Patient arrived to Med Surg via stretcher.

## 2023-01-03 NOTE — PROGRESS NOTES
Problem: Falls - Risk of  Goal: *Absence of Falls  Description: Document Rockyshenagwendolyn GrossRamon Fall Risk and appropriate interventions in the flowsheet.   Outcome: Progressing Towards Goal  Note: Fall Risk Interventions:                                Problem: Patient Education: Go to Patient Education Activity  Goal: Patient/Family Education  Outcome: Progressing Towards Goal

## 2023-01-03 NOTE — H&P
[facility]   Admission History & Physical        1/3/2023 7:52 AM  Patient: Kg Berry 1940  PCP: Jeyson Mckinney    HISTORY  Chief Complaint:   Chief Complaint   Patient presents with    Syncope       HPI: 81 y.o. female presenting for admission to [facility] for further evaluation and treatment for MELONIE (acute kidney injury) ([facility] [place] [address] She  has a past medical history of Diverticula of colon, GERD (gastroesophageal reflux disease), Hypertension, and Joint pain. With history of hypertension and hyperlipidemia presenting to the ED following a witnessed syncopal event while sitting at the dining table. After eating she was observed to suddenly slumped with loss of consciousness but no loss of respiration or seizure activity. There was no loss of bowel or bladder control. She was felt to have a regular pulse. She apparently was unresponsive for 1 to 2 minutes. Assessment in the ED was nonfocal.  CT scan unremarkable. Pressure was mildly hypotensive with the initial systolic blood pressure 872.  laboratory suggested some dehydration with acute kidney injury. She received 2 L of normal saline and was admitted to a monitored bed for further evaluation. Patient has complete absence of recollection during the episode. She remembers completing her meal but then became sleepy and laid her head down on the table. When she woke up her  was there, her daughter had arrived and they were talking about the ambulance coming. She denied any focal deficit. She denied symptoms of palpitation. There was no sense of passing out. There was no feeling of being cold and clammy. She has not had prior vascular event. She has rested well without symptoms since admission.       Past Medical History:  Past Medical History:   Diagnosis Date    Diverticula of colon     GERD (gastroesophageal reflux disease)     Hypertension     Joint pain        Past Surgical History:  Past Surgical History: Procedure Laterality Date    HX CATARACT REMOVAL      HX OTHER SURGICAL      tumor on right side    HX TUMOR REMOVAL      fatty tumor upper left arm       Medication:  Prior to Admission medications    Medication Sig Start Date End Date Taking? Authorizing Provider   donepeziL (ARICEPT) 5 mg tablet  3/25/22  Yes Morenita, MD Tony   terazosin (HYTRIN) 2 mg capsule terazosin 2 mg capsule   Take 1 capsule every day by oral route. Yes Other, MD Tony   hydroCHLOROthiazide (MICROZIDE) 12.5 mg capsule    Yes Other, MD Tony   dicyclomine (BENTYL) 20 mg tablet Take 1 Tablet by mouth every six (6) hours as needed for Abdominal Cramps. 2/3/22  Yes Elayne Booth MD   dicyclomine (BENTYL) 20 mg tablet Take 1 Tablet by mouth every six (6) hours as needed for Abdominal Cramps. 9/27/21  Yes Elayne Booth MD   mirabegron ER (MYRBETRIQ) 25 mg ER tablet Take 1 Tablet by mouth two (2) times a day. Yes Other, MD Tony   lovastatin (MEVACOR) 20 mg tablet    Yes Other, MD Tony   acetaminophen (TYLENOL ARTHRITIS PAIN PO) Take 1 Tablet by mouth as needed. Yes Other, MD Tony   amLODIPine (NORVASC) 10 mg tablet Take  by mouth daily. Yes Provider, Historical   losartan (COZAAR) 100 mg tablet Take 100 mg by mouth daily. Yes Provider, Historical   omeprazole (PRILOSEC) 40 mg capsule Take 40 mg by mouth daily. Yes Provider, Historical   ERGOCALCIFEROL, VITAMIN D2, (VITAMIN D2 PO) Take  by mouth. Yes Provider, Historical   cyanocobalamin (VITAMIN B12) 500 mcg tablet Take 500 mcg by mouth daily. Yes Provider, Historical   polyethylene glycol (Miralax) 17 gram/dose powder Take 17 g by mouth daily. 1 tablespoon with 8 oz of water daily  Patient not taking: No sig reported 5/26/22   Carlota Au MD   ondansetron (Zofran ODT) 4 mg disintegrating tablet Take 1 Tablet by mouth every eight (8) hours as needed for Nausea.   Patient not taking: No sig reported 9/27/21   Elayne Booth MD   fluticasone propionate (FLONASE) 50 mcg/actuation nasal spray Flonase Allergy Relief 50 mcg/actuation nasal spray,suspension   Spray 1 spray every day by intranasal route. Patient not taking: No sig reported    Tony Xavier MD   ezetimibe (ZETIA) 10 mg tablet ezetimibe 10 mg tablet   Take 1 tablet every day by oral route. Patient not taking: No sig reported    Tony Xavier MD   diclofenac (VOLTAREN) 1 % gel diclofenac 1 % gel topical kit  Patient not taking: No sig reported    Morenita, MD Tony   Vitamin D3-Menaquinone 7 1000-90 unit-mcg TbDi cholecalciferol(vit D3) 1,000 unit-vit K2 90 mcg disintegrating tablet   Take by oral route. Patient not taking: Reported on 1/2/2023    Tony Xavier MD   DAILY MULTI-VITAMIN PO Daily Multi-Vitamin  Patient not taking: No sig reported    Tony Xavier MD   RANITIDINE HCL (ZANTAC PO) Take  by mouth. Patient not taking: No sig reported    Provider, Historical   L.ACIDOPHILUS/B.BIFIDUM&LONGUM (HEALTHY COLON PO) Take  by mouth. Patient not taking: No sig reported    Provider, Historical   FOLIC ACID/MULTIVIT-MIN/LUTEIN (CENTRUM SILVER PO) Take  by mouth. Patient not taking: No sig reported    Provider, Historical       Allergies:   Allergies   Allergen Reactions    Aspirin Nausea Only    Codeine Not Reported This Time       Social History:  Social History     Tobacco Use    Smoking status: Former     Packs/day: 0.50     Types: Cigarettes    Smokeless tobacco: Never   Vaping Use    Vaping Use: Never used   Substance Use Topics    Alcohol use: No     Alcohol/week: 0.0 standard drinks    Drug use: No       Family History:  Family History   Problem Relation Age of Onset    Breast Cancer Mother     Diabetes Mother     Heart Disease Mother     Hypertension Mother        ROS:  Total of 12 systems reviewed as follows:  POSITIVE= bolded text  Negative = text not bolded       General:  fever, chills, sweats, generalized weakness, weight loss/gain, loss of appetite   Eyes:    blurred vision, eye pain, loss of vision, double vision  ENT:    rhinorrhea, pharyngitis   Respiratory:  cough, sputum production, SOB, GIL, wheezing, pleuritic pain   Cardiology:   chest pain, palpitations, orthopnea, PND, edema, syncope   Gastrointestinal:  abdominal pain , N/V, diarrhea, dysphagia, constipation, bleeding   Genitourinary:  frequency, urgency, dysuria, hematuria, incontinence, prostatism   Muskuloskeletal: arthralgia, myalgia, back pain  Hematology:   easy bruising, nose or gum bleeding, lymphadenopathy   Dermatological: rash, ulceration, pruritis, color change / jaundice  Endocrine:   hot flashes or polydipsia   Neurological:  headache, dizziness, confusion, focal weakness, paresthesia, speech difficulties, memory loss, gait difficulty  Psychological: feelings of anxiety, depression, agitation      PHYSICAL EXAM:  Patient Vitals for the past 24 hrs:   Temp Pulse Resp BP SpO2   01/03/23 0731 97.4 °F (36.3 °C) (!) 59 -- (!) 172/72 97 %   01/03/23 0355 97.5 °F (36.4 °C) 64 18 (!) 160/71 98 %   01/02/23 2324 97.7 °F (36.5 °C) 66 18 (!) 149/67 97 %   01/02/23 2252 -- 66 16 -- 98 %   01/02/23 2222 -- 70 21 (!) 146/61 96 %   01/02/23 2216 -- 69 17 -- 97 %   01/02/23 2207 -- 70 18 (!) 154/62 97 %   01/02/23 2201 -- 67 18 (!) 148/58 95 %   01/02/23 2151 -- 68 19 -- 97 %   01/02/23 2146 -- 68 18 (!) 148/55 97 %   01/02/23 2143 -- 69 19 -- 97 %   01/02/23 2136 -- 70 21 -- 97 %   01/02/23 2121 -- 69 20 -- 98 %   01/02/23 2105 -- -- -- (!) 147/60 --   01/02/23 2050 -- 70 20 (!) 142/57 98 %   01/02/23 2035 -- 69 21 -- 97 %   01/02/23 2032 -- 70 21 (!) 152/57 98 %   01/02/23 2018 -- 70 22 (!) 147/60 96 %   01/02/23 2001 -- 80 21 (!) 144/57 97 %   01/02/23 2000 98.1 °F (36.7 °C) 77 22 (!) 147/59 99 %   01/02/23 1946 -- -- -- 111/76 --   01/02/23 1936 -- 77 17 -- 97 %       General:    Alert, cooperative, no distress, appears stated age.      HEENT: Atraumatic, anicteric sclerae, pink conjunctivae     No oral ulcers, mucosa moist, throat clear, dentition fair  Neck:  Supple, symmetrical;   thyroid non tender  Lungs:   Clear to auscultation bilaterally. No wheezing or rhonchi. No rales. Chest wall:  No tenderness. No accessory muscle use. Heart:   Regular rhythm. No  murmur. No edema  Abdomen:   Soft, non-tender. Not distended. Bowel sounds normal  Extremities: No cyanosis. No clubbing      Capillary refill normal,  Radial pulse 2+,  DP 1+  Skin:     Not pale. Not jaundiced. No rashes   Psych:  Not depressed. Not anxious or agitated. Neurologic: EOMs intact. No facial asymmetry. No aphasia or slurred speech. Symmetrical strength, Sensation grossly intact. Alert and oriented X 4. Lab Data Reviewed:    Recent Results (from the past 24 hour(s))   EKG, 12 LEAD, INITIAL    Collection Time: 01/02/23  7:50 PM   Result Value Ref Range    Ventricular Rate 76 BPM    Atrial Rate 76 BPM    P-R Interval 176 ms    QRS Duration 76 ms    Q-T Interval 400 ms    QTC Calculation (Bezet) 450 ms    Calculated P Axis 37 degrees    Calculated R Axis 48 degrees    Calculated T Axis 62 degrees    Diagnosis       Normal sinus rhythm  Normal ECG  When compared with ECG of 26-MAY-2022 06:43,  No significant change was found     CBC WITH AUTOMATED DIFF    Collection Time: 01/02/23  7:52 PM   Result Value Ref Range    WBC 5.6 3.6 - 11.0 K/uL    RBC 4.28 3.80 - 5.20 M/uL    HGB 10.3 (L) 11.5 - 16.0 g/dL    HCT 34.0 (L) 35.0 - 47.0 %    MCV 79.4 (L) 80.0 - 99.0 FL    MCH 24.1 (L) 26.0 - 34.0 PG    MCHC 30.3 30.0 - 36.5 g/dL    RDW 16.7 (H) 11.5 - 14.5 %    PLATELET 590 238 - 633 K/uL    MPV 10.0 8.9 - 12.9 FL    NRBC 0.0 0.0  WBC    ABSOLUTE NRBC 0.00 0.00 - 0.01 K/uL    NEUTROPHILS 61 32 - 75 %    LYMPHOCYTES 31 12 - 49 %    MONOCYTES 6 5 - 13 %    EOSINOPHILS 1 0 - 7 %    BASOPHILS 0 0 - 1 %    IMMATURE GRANULOCYTES 0 0 - 0.5 %    ABS. NEUTROPHILS 3.4 1.8 - 8.0 K/UL    ABS. LYMPHOCYTES 1.7 0.8 - 3.5 K/UL    ABS. MONOCYTES 0.3 0.0 - 1.0 K/UL    ABS.  EOSINOPHILS 0.1 0.0 - 0.4 K/UL    ABS. BASOPHILS 0.0 0.0 - 0.1 K/UL    ABS. IMM. GRANS. 0.0 0.00 - 0.04 K/UL    DF AUTOMATED     METABOLIC PANEL, COMPREHENSIVE    Collection Time: 01/02/23  7:52 PM   Result Value Ref Range    Sodium 138 136 - 145 mmol/L    Potassium 3.7 3.5 - 5.1 mmol/L    Chloride 102 97 - 108 mmol/L    CO2 28 21 - 32 mmol/L    Anion gap 8 5 - 15 mmol/L    Glucose 223 (H) 65 - 100 mg/dL    BUN 25 (H) 6 - 20 MG/DL    Creatinine 1.84 (H) 0.55 - 1.02 MG/DL    BUN/Creatinine ratio 14 12 - 20      eGFR 27 (L) >60 ml/min/1.73m2    Calcium 9.1 8.5 - 10.1 MG/DL    Bilirubin, total 0.3 0.2 - 1.0 MG/DL    ALT (SGPT) 16 12 - 78 U/L    AST (SGOT) 15 15 - 37 U/L    Alk. phosphatase 95 45 - 117 U/L    Protein, total 6.3 (L) 6.4 - 8.2 g/dL    Albumin 3.2 (L) 3.5 - 5.0 g/dL    Globulin 3.1 2.0 - 4.0 g/dL    A-G Ratio 1.0 (L) 1.1 - 2.2     D DIMER    Collection Time: 01/02/23  7:52 PM   Result Value Ref Range    D-dimer 2.06 (H) 0.00 - 0.65 mg/L FEU   MAGNESIUM    Collection Time: 01/02/23  7:52 PM   Result Value Ref Range    Magnesium 1.7 1.6 - 2.4 mg/dL   TROPONIN-HIGH SENSITIVITY    Collection Time: 01/02/23  8:00 PM   Result Value Ref Range    Troponin-High Sensitivity 8 0 - 51 ng/L   OCCULT BLOOD, STOOL    Collection Time: 01/02/23  9:25 PM   Result Value Ref Range    Occult blood, stool Negative NEG     URINALYSIS W/ RFLX MICROSCOPIC    Collection Time: 01/02/23 10:37 PM   Result Value Ref Range    Color YELLOW/STRAW      Appearance CLEAR CLEAR      Specific gravity 1.010 1.003 - 1.030      pH (UA) 6.0 5.0 - 8.0      Protein Negative NEG mg/dL    Glucose Negative NEG mg/dL    Ketone Negative NEG mg/dL    Bilirubin Negative NEG      Blood Negative NEG      Urobilinogen 0.2 0.2 - 1.0 EU/dL    Nitrites Negative NEG      Leukocyte Esterase Negative NEG         EKG: NSR 76 bpm, Normal, NSC May 2022    Radiology:  pCXR 1/2:  A single view of the chest demonstrates clear lungs. Mild cardiomegaly  is again noted.  The bones and soft tissues are within normal limits. IMPRESSION: No acute process or change compared to the prior exam    CT HEAD 1/2:  The ventricles and sulci are normal in size, shape and configuration. There is  no significant white matter disease. There is no intracranial hemorrhage,  extra-axial collection, or mass effect. The basilar cisterns are open. No CT  evidence of acute infarct. The bone windows demonstrate no abnormalities. The visualized portions of the  paranasal sinuses and mastoid air cells are clear. IMPRESSION: No acute intracranial process. Care Plan discussed with:   Patient x    Family     RN x         Consultant      Expected  Disposition:   Home with Family x   HH/PT/OT/RN    SNF/LTC    TIARA      TOTAL TIME:  54 Minutes      Comments    x Reviewed previous records   >50% of visit spent in counseling and coordination of care x Discussion with patient and/or family and questions answered       _______________________________________________________  Given the patient's current clinical presentation, I have a high level of concern for decompensation if discharged from the emergency department. Complex decision making was performed, which includes reviewing the patient's available past medical records, laboratory results, and x-ray films. My assessment of this patient's clinical condition and my plan of care is as follows. ASSESSMENT / PLAN    Principal Problem:    MELONIE (acute kidney injury) ([facility] [address] (1/2/2023)  Patient received 2 L bolus of normal saline in the ED followed by 100 cc/h  Maintain for 24 hours  Repeat basic panel and magnesium in the morning    Active Problems:    Syncope (1/2/2023)  Noticed drop while at the dinner table  Sleepy, ?  Dosed off  Hold current treatments of Cozaar, HCTZ, Hytrin to avoid hypotension  Admits to drinking poorly - will hold HCTZ on d/c  Maintain Norvasc 10 mg daily  Monitor for orthostatic blood pressure drop or arrhythmia  Follow for drop in hemoglobin  Carotid duplex      Essential hypertension (4/5/2016)  Monitor for orthostatic drop  Maintain current treatment with Norvasc 10 mg  Hold Cozaar, HCTZ, Hytrin      Mixed hyperlipidemia (4/5/2016)  Resume statin therapy with Mevacor on discharge      Gastroesophageal reflux disease without esophagitis (4/5/2016)  Maintain PPI treatment with Protonix  R/o acute bleed      SAFETY:   Code Status:Full  DVT prophylaxis:Lovenox  Stress Ulcer prophylaxis: Protonix  Bladder catheter:no  Family Contact Info:  Primary Emergency Contact: Theresa Deshpande, [place] Phone: [phone]  Bedded: [facility] Room 114/01  Goals of Care: *  Disposition: TBD, likely home when stable  Admission status:  Observation    -Tentative plan of care discussed with patient / family, who demonstrated understanding and is in agreement to the above  -Case was reviewed by records from the ED Provider, Hallie Green,  [id number] Lizzy Da Silva MD  [facility] Hospitalist  [phone]

## 2023-01-03 NOTE — PROGRESS NOTES
Pharmacist Review and Automatic Dose Adjustment of Prophylactic Enoxaparin    *Review reason for admission/hospital problem list*    The reviewing pharmacist has made an adjustment to the ordered enoxaparin dose or converted to UFH per the approved [facility] protocol and table as identified below. Gildardo Durham is a 81 y.o. female. No lab exists for component: CREATININE    Estimated Creatinine Clearance: 37.7 mL/min (A) (based on SCr of 1.42 mg/dL (H)). Height:   Ht Readings from Last 1 Encounters:   01/02/23 170.2 cm (67\")     Weight:  Wt Readings from Last 1 Encounters:   01/03/23 99.7 kg (219 lb 12.8 oz)               Plan: Based upon the patient's weight (99.7 kg) and renal function (CrCl ~ 37.7 ml/min), the ordered enoxaparin dose of 30 mg Lovenox SQ daily has been changed to 40 mg SQ daily      Thank you,  Caterina Chew MS R. Ph

## 2023-01-03 NOTE — ED PROVIDER NOTES
EMERGENCY DEPARTMENT HISTORY AND PHYSICAL EXAM      Date: 1/2/2023  Patient Name: Varinder Sevilla      Diagnosis     Clinical Impression:   1. Syncope, unspecified syncope type    2. MELONIE (acute kidney injury) ([facility] [address]                    History of Presenting Illness     Chief Complaint   Patient presents with    Syncope       History Provided By: Patient and EMS    HPI:   The history is provided by the patient and the EMS personnel. Syncope   This is a new problem. The current episode started less than 1 hour ago. The problem has been resolved. She lost consciousness for a period of less than one minute. The problem is associated with nothing. Associated symptoms include light-headedness. Pertinent negatives include no visual change, no chest pain, no palpitations, no fever, no abdominal pain, no bowel incontinence, no nausea, no vomiting, no bladder incontinence, no congestion, no headaches, no back pain, no focal weakness, no slurred speech, no weakness, no melena and no head injury. She has tried nothing for the symptoms. The treatment provided no relief. Her past medical history is significant for HTN. Varinder Sevilla, 81 y.o. female  presents to the ED with cc of syncopal episode at home. Patient reports she was sitting at the table became lightheaded and subsequently passed out had a syncopal episode for less than 1 minute. No reported seizure activity per EMS. She reports she has been in her normal state of health eating and drinking normally with no vomiting or diarrhea. She denies any chest pain palpitations shortness of breath abdominal pain. She had no nausea vomiting after the syncopal episode. She currently has no complaints and reports she is back to her baseline. EKG done by EMS at the scene revealed a normal sinus rhythm.     Family at the bedside provide additional history reports that she was sitting at the table and he reports that she looked like she was going to sleep, he reports she went unresponsive, he thinks this lasted for several minutes until EMS arrived. No seizure activity was seen she had no cyanosis, he she was snoring at 1 point time but he reports she did not stop breathing. There is no cyanosis and he reports he checked a pulse and pulses were present. No seizure activity was seen. Patient had just eaten dinner. There are no other complaints, changes, or physical findings at this time. PCP: Delisa Seek    No current facility-administered medications on file prior to encounter. Current Outpatient Medications on File Prior to Encounter   Medication Sig Dispense Refill    donepeziL (ARICEPT) 5 mg tablet Take 5mg daily for 30 days then increase to 10mg daily (Patient not taking: Reported on 8/25/2022)      terazosin (HYTRIN) 2 mg capsule terazosin 2 mg capsule   Take 1 capsule every day by oral route. hydroCHLOROthiazide (MICROZIDE) 12.5 mg capsule hydrochlorothiazide 12.5 mg capsule   Take 1 capsule every day by oral route. (Patient not taking: Reported on 8/25/2022)      polyethylene glycol (Miralax) 17 gram/dose powder Take 17 g by mouth daily. 1 tablespoon with 8 oz of water daily (Patient not taking: Reported on 8/25/2022) 507 g 0    dicyclomine (BENTYL) 20 mg tablet Take 1 Tablet by mouth every six (6) hours as needed for Abdominal Cramps. 15 Tablet 0    ondansetron (Zofran ODT) 4 mg disintegrating tablet Take 1 Tablet by mouth every eight (8) hours as needed for Nausea. (Patient not taking: Reported on 8/25/2022) 20 Tablet 0    dicyclomine (BENTYL) 20 mg tablet Take 1 Tablet by mouth every six (6) hours as needed for Abdominal Cramps. 15 Tablet 0    mirabegron ER (MYRBETRIQ) 25 mg ER tablet Take 1 Tablet by mouth two (2) times a day.  (Patient not taking: Reported on 8/25/2022)      lovastatin (MEVACOR) 20 mg tablet lovastatin 20 mg tablet (Patient not taking: Reported on 8/25/2022)      fluticasone propionate (FLONASE) 50 mcg/actuation nasal spray Flonase Allergy Relief 50 mcg/actuation nasal spray,suspension   Spray 1 spray every day by intranasal route. (Patient not taking: Reported on 8/31/2022)      ezetimibe (ZETIA) 10 mg tablet ezetimibe 10 mg tablet   Take 1 tablet every day by oral route. (Patient not taking: Reported on 8/25/2022)      diclofenac (VOLTAREN) 1 % gel diclofenac 1 % gel topical kit (Patient not taking: Reported on 8/25/2022)      Vitamin D3-Menaquinone 7 1000-90 unit-mcg TbDi cholecalciferol(vit D3) 1,000 unit-vit K2 90 mcg disintegrating tablet   Take by oral route. DAILY MULTI-VITAMIN PO Daily Multi-Vitamin (Patient not taking: Reported on 8/25/2022)      acetaminophen (TYLENOL ARTHRITIS PAIN PO) Take 1 Tablet by mouth as needed. RANITIDINE HCL (ZANTAC PO) Take  by mouth. (Patient not taking: Reported on 9/16/2021)      amLODIPine (NORVASC) 10 mg tablet Take  by mouth daily. losartan (COZAAR) 100 mg tablet Take 100 mg by mouth daily. omeprazole (PRILOSEC) 40 mg capsule Take 40 mg by mouth daily. (Patient not taking: No sig reported)      L.ACIDOPHILUS/B.BIFIDUM&LONGUM (HEALTHY COLON PO) Take  by mouth. (Patient not taking: Reported on 8/25/2022)      ERGOCALCIFEROL, VITAMIN D2, (VITAMIN D2 PO) Take  by mouth. (Patient not taking: Reported on 8/25/2022)      cyanocobalamin (VITAMIN B12) 500 mcg tablet Take 500 mcg by mouth daily. FOLIC ACID/MULTIVIT-MIN/LUTEIN (CENTRUM SILVER PO) Take  by mouth.  (Patient not taking: Reported on 8/31/2022)         Past History     Past Medical History:  Past Medical History:   Diagnosis Date    Diverticula of colon     GERD (gastroesophageal reflux disease)     Hypertension     Joint pain        Past Surgical History:  Past Surgical History:   Procedure Laterality Date    HX CATARACT REMOVAL      HX OTHER SURGICAL      tumor on right side    HX TUMOR REMOVAL      fatty tumor upper left arm       Family History:  Family History   Problem Relation Age of Onset    Breast Cancer Mother Diabetes Mother     Heart Disease Mother     Hypertension Mother        Social History:  Social History     Socioeconomic History    Marital status:    Tobacco Use    Smoking status: Former     Packs/day: 0.50     Types: Cigarettes    Smokeless tobacco: Never   Vaping Use    Vaping Use: Never used   Substance and Sexual Activity    Alcohol use: No     Alcohol/week: 0.0 standard drinks    Drug use: No    Sexual activity: Not Currently     Partners: Male       Allergies: Allergies   Allergen Reactions    Aspirin Nausea Only    Codeine Not Reported This Time           Social Determinants of Health     Tobacco Use: Medium Risk    Smoking Tobacco Use: Former    Smokeless Tobacco Use: Never    Passive Exposure: Not on file   Alcohol Use: Not on file   Financial Resource Strain: Not on file   Food Insecurity: Not on file   Transportation Needs: Not on file   Physical Activity: Not on file   Stress: Not on file   Social Connections: Not on file   Intimate Partner Violence: Not on file   Depression: Not at risk    PHQ-2 Score: 0   Housing Stability: Not on file     Social Connections: Not on file             Review of Systems   Review of Systems   Constitutional:  Negative for activity change, appetite change, chills and fever. HENT:  Negative for congestion. Eyes:  Negative for visual disturbance. Respiratory:  Negative for cough and shortness of breath. Cardiovascular:  Positive for syncope. Negative for chest pain and palpitations. Gastrointestinal:  Negative for abdominal pain, bowel incontinence, diarrhea, melena, nausea and vomiting. Genitourinary:  Negative for bladder incontinence and dysuria. Musculoskeletal:  Negative for back pain and neck pain. Skin:  Negative for rash and wound. Neurological:  Positive for light-headedness. Negative for focal weakness, weakness and headaches. Physical Exam   Physical Exam  Vitals and nursing note reviewed.    Constitutional:       General: She is not in acute distress. Appearance: Normal appearance. She is well-developed. She is not ill-appearing, toxic-appearing or diaphoretic. HENT:      Head: Normocephalic and atraumatic. Comments: No evidence of trauma     Nose: Nose normal.      Mouth/Throat:      Mouth: Mucous membranes are moist.      Pharynx: Oropharynx is clear. Eyes:      Extraocular Movements: Extraocular movements intact. Conjunctiva/sclera: Conjunctivae normal.      Pupils: Pupils are equal, round, and reactive to light. Cardiovascular:      Rate and Rhythm: Normal rate and regular rhythm. Pulses: Normal pulses. Heart sounds: Normal heart sounds. Pulmonary:      Effort: Pulmonary effort is normal. No respiratory distress. Breath sounds: Normal breath sounds. No stridor. No wheezing, rhonchi or rales. Abdominal:      General: There is no distension. Palpations: Abdomen is soft. There is no mass. Tenderness: There is no abdominal tenderness. There is no guarding or rebound. Hernia: No hernia is present. Genitourinary:     Comments: Minimal stool in rectal vault, brown stool no melena, grossly nonbloody  Musculoskeletal:         General: No swelling, tenderness, deformity or signs of injury. Normal range of motion. Cervical back: Normal range of motion and neck supple. No rigidity or tenderness. No muscular tenderness. Right lower leg: No edema. Left lower leg: No edema. Skin:     General: Skin is warm and dry. Capillary Refill: Capillary refill takes less than 2 seconds. Findings: No erythema or rash. Neurological:      General: No focal deficit present. Mental Status: She is alert and oriented to person, place, and time. Mental status is at baseline. GCS: GCS eye subscore is 4. GCS verbal subscore is 5. GCS motor subscore is 6. Cranial Nerves: Cranial nerves 2-12 are intact. No cranial nerve deficit. Sensory: Sensation is intact.  No sensory deficit. Motor: Motor function is intact. Psychiatric:         Mood and Affect: Mood normal.         Behavior: Behavior normal.         Diagnostic Study Results     Labs -     Recent Results (from the past 12 hour(s))   EKG, 12 LEAD, INITIAL    Collection Time: 01/02/23  7:50 PM   Result Value Ref Range    Ventricular Rate 76 BPM    Atrial Rate 76 BPM    P-R Interval 176 ms    QRS Duration 76 ms    Q-T Interval 400 ms    QTC Calculation (Bezet) 450 ms    Calculated P Axis 37 degrees    Calculated R Axis 48 degrees    Calculated T Axis 62 degrees    Diagnosis       Normal sinus rhythm  Normal ECG  When compared with ECG of 26-MAY-2022 06:43,  No significant change was found     CBC WITH AUTOMATED DIFF    Collection Time: 01/02/23  7:52 PM   Result Value Ref Range    WBC 5.6 3.6 - 11.0 K/uL    RBC 4.28 3.80 - 5.20 M/uL    HGB 10.3 (L) 11.5 - 16.0 g/dL    HCT 34.0 (L) 35.0 - 47.0 %    MCV 79.4 (L) 80.0 - 99.0 FL    MCH 24.1 (L) 26.0 - 34.0 PG    MCHC 30.3 30.0 - 36.5 g/dL    RDW 16.7 (H) 11.5 - 14.5 %    PLATELET 048 167 - 208 K/uL    MPV 10.0 8.9 - 12.9 FL    NRBC 0.0 0.0  WBC    ABSOLUTE NRBC 0.00 0.00 - 0.01 K/uL    NEUTROPHILS 61 32 - 75 %    LYMPHOCYTES 31 12 - 49 %    MONOCYTES 6 5 - 13 %    EOSINOPHILS 1 0 - 7 %    BASOPHILS 0 0 - 1 %    IMMATURE GRANULOCYTES 0 0 - 0.5 %    ABS. NEUTROPHILS 3.4 1.8 - 8.0 K/UL    ABS. LYMPHOCYTES 1.7 0.8 - 3.5 K/UL    ABS. MONOCYTES 0.3 0.0 - 1.0 K/UL    ABS. EOSINOPHILS 0.1 0.0 - 0.4 K/UL    ABS. BASOPHILS 0.0 0.0 - 0.1 K/UL    ABS. IMM.  GRANS. 0.0 0.00 - 0.04 K/UL    DF AUTOMATED     METABOLIC PANEL, COMPREHENSIVE    Collection Time: 01/02/23  7:52 PM   Result Value Ref Range    Sodium 138 136 - 145 mmol/L    Potassium 3.7 3.5 - 5.1 mmol/L    Chloride 102 97 - 108 mmol/L    CO2 28 21 - 32 mmol/L    Anion gap 8 5 - 15 mmol/L    Glucose 223 (H) 65 - 100 mg/dL    BUN 25 (H) 6 - 20 MG/DL    Creatinine 1.84 (H) 0.55 - 1.02 MG/DL    BUN/Creatinine ratio 14 12 - 20 eGFR 27 (L) >60 ml/min/1.73m2    Calcium 9.1 8.5 - 10.1 MG/DL    Bilirubin, total 0.3 0.2 - 1.0 MG/DL    ALT (SGPT) 16 12 - 78 U/L    AST (SGOT) 15 15 - 37 U/L    Alk. phosphatase 95 45 - 117 U/L    Protein, total 6.3 (L) 6.4 - 8.2 g/dL    Albumin 3.2 (L) 3.5 - 5.0 g/dL    Globulin 3.1 2.0 - 4.0 g/dL    A-G Ratio 1.0 (L) 1.1 - 2.2     D DIMER    Collection Time: 01/02/23  7:52 PM   Result Value Ref Range    D-dimer 2.06 (H) 0.00 - 0.65 mg/L FEU   MAGNESIUM    Collection Time: 01/02/23  7:52 PM   Result Value Ref Range    Magnesium 1.7 1.6 - 2.4 mg/dL   TROPONIN-HIGH SENSITIVITY    Collection Time: 01/02/23  8:00 PM   Result Value Ref Range    Troponin-High Sensitivity 8 0 - 51 ng/L   OCCULT BLOOD, STOOL    Collection Time: 01/02/23  9:25 PM   Result Value Ref Range    Occult blood, stool Negative NEG         Radiologic Studies -   XR CHEST SNGL V   Final Result   No acute process or change compared to the prior exam.      CT HEAD WO CONT   Final Result   No acute intracranial process. CT Results  (Last 48 hours)                 01/02/23 2123  CT HEAD WO CONT Final result    Impression:  No acute intracranial process. Narrative:  EXAM: CT HEAD WO CONT       INDICATION: syncope       COMPARISON: None. CONTRAST: None. TECHNIQUE: Unenhanced CT of the head was performed using 5 mm images. Brain and   bone windows were generated. Coronal and sagittal reformats. CT dose reduction   was achieved through use of a standardized protocol tailored for this   examination and automatic exposure control for dose modulation. FINDINGS:   The ventricles and sulci are normal in size, shape and configuration. There is   no significant white matter disease. There is no intracranial hemorrhage,   extra-axial collection, or mass effect. The basilar cisterns are open. No CT   evidence of acute infarct. The bone windows demonstrate no abnormalities.  The visualized portions of the paranasal sinuses and mastoid air cells are clear. CXR Results  (Last 48 hours)                 01/02/23 2123  XR CHEST SNGL V Final result    Impression:  No acute process or change compared to the prior exam.       Narrative:  EXAM: XR CHEST SNGL V       INDICATION: syncope       COMPARISON: 5/26/2022. FINDINGS: A single view of the chest demonstrates clear lungs. Mild cardiomegaly   is again noted. The bones and soft tissues are within normal limits. Medical Decision Making   I am the first provider for this patient. I reviewed the vital signs, available nursing notes, past medical history, past surgical history, family history and social history. Vital Signs-Reviewed the patient's vital signs. Patient Vitals for the past 12 hrs:   Temp Pulse Resp BP SpO2   01/02/23 2216 -- 69 17 -- 97 %   01/02/23 2201 -- 67 18 (!) 148/58 95 %   01/02/23 2151 -- 68 19 -- 97 %   01/02/23 2146 -- 68 18 (!) 148/55 97 %   01/02/23 2143 -- 69 19 -- 97 %   01/02/23 2136 -- 70 21 -- 97 %   01/02/23 2121 -- 69 20 -- 98 %   01/02/23 2105 -- -- -- (!) 147/60 --   01/02/23 2050 -- 70 20 (!) 142/57 98 %   01/02/23 2035 -- 69 21 -- 97 %   01/02/23 2032 -- 70 21 (!) 152/57 98 %   01/02/23 2018 -- 70 22 (!) 147/60 96 %   01/02/23 2001 -- 80 21 (!) 144/57 97 %   01/02/23 2000 98.1 °F (36.7 °C) 77 22 (!) 147/59 99 %   01/02/23 1946 -- -- -- 111/76 --   01/02/23 1936 -- 77 17 -- 97 %           EKG interpretation: (Preliminary)  Rhythm: normal sinus rhythm;  regular . Rate (approx.): 76; Blocks: none; Ectopy: none Axis: normal; P wave: normal; QRS interval: normal ; ST/T wave: normal; in  Lead: ; Other findings: .        Records Reviewed: Nursing Notes, Old Medical Records, and Previous electrocardiograms    Provider Notes (Medical Decision Making):   Presents after syncopal episode at home, will check laboratory studies head CT EKG troponin laboratory studies urinalysis.   Patient is asymptomatic without complaints currently. ED Course:   Initial assessment performed. The patients presenting problems have been discussed, and they are in agreement with the care plan formulated and outlined with them. I have encouraged them to ask questions as they arise throughout their visit. ED Course as of 01/02/23 2237 Mon Jan 02, 2023 2124 Updated patient on lab results plan for admission, rectal exam performed []   2235 Spoke with Dr. Sammy Hanson agrees with admission to hydrate for MELONIE and evaluate for syncope, patient D-dimer is elevated will hydrate and day hospitalist will determine VQ scan versus CTA.  [MF]      ED Course User Index  [MF] Lesley Elmore MD         Medications Given in the ED:    Medications   sodium chloride (NS) flush 5-10 mL (has no administration in time range)   acetaminophen (TYLENOL) tablet 650 mg (has no administration in time range)   ondansetron (ZOFRAN) injection 4 mg (has no administration in time range)   0.9% sodium chloride infusion (has no administration in time range)   amLODIPine (NORVASC) tablet 10 mg (has no administration in time range)   sodium chloride 0.9 % bolus infusion 1,000 mL (0 mL IntraVENous IV Completed 1/2/23 2134)   sodium chloride 0.9 % bolus infusion 1,000 mL (1,000 mL IntraVENous New Bag 1/2/23 2134)         Medical Decision Making  Number of Diagnoses or Management Options  MELONIE (acute kidney injury) ([facility] [address]): new, needed workup  Syncope, unspecified syncope type: new, needed workup     Amount and/or Complexity of Data Reviewed  Clinical lab tests: ordered and reviewed  Tests in the radiology section of CPT®: ordered and reviewed  Decide to obtain previous medical records or to obtain history from someone other than the patient: yes  Obtain history from someone other than the patient: yes  Review and summarize past medical records: yes  Discuss the patient with other providers: yes  Independent visualization of images, tracings, or specimens: yes (EKG)    Risk of Complications, Morbidity, and/or Mortality  Presenting problems: high  Diagnostic procedures: high  Management options: high    Patient Progress  Patient progress: improved    MDM  Reviewed: previous chart, nursing note and vitals  Reviewed previous: labs, ECG and CT scan  Interpretation: labs, ECG, x-ray and CT scan        10:36 PM    Patient presents after syncopal episode at home, she was found to have an MELONIE, she was hydrated IV fluids here will admit for IV fluid hydration and further evaluation. She had elevated D-dimer, unable to do CTA due to elevated GFR, will defer to day hospitalist for further evaluation of her elevated D-dimer to rule out possible pulmonary embolism. EKG revealed normal sinus rhythm and troponin was negative, chest x-ray was clear head CT normal.    I have reviewed all pertinent and currently available diagnostic test results for this visit including, but not limited to, labs, xrays, and EKGs. I have reviewed all pertinent and currently available medical records. My plan of care and further evaluation and/or disposition is based on these results, as well as the initial, and subsequent, history and physical exam, as well as any additional complaints during the visit. Ramiro Arcos MD        Procedures                Disposition:  Admitted        Attestations: Ramiro Arcos MD    Please note that this dictation was completed with [employer], the computer voice recognition software. Quite often unanticipated grammatical, syntax, homophones, and other interpretive errors are inadvertently transcribed by the computer software. Please disregard these errors. Please excuse any errors that have escaped final proofreading. Thank you.

## 2023-01-03 NOTE — PROGRESS NOTES
Bedside and Verbal shift change report given to GENESIS Cosme (oncoming nurse) by Sosa Buchanan LPN (offgoing nurse). Report included the following information SBAR, Kardex, Intake/Output, and MAR.

## 2023-01-04 ENCOUNTER — APPOINTMENT (OUTPATIENT)
Dept: CT IMAGING | Age: 83
End: 2023-01-04
Attending: INTERNAL MEDICINE
Payer: MEDICARE

## 2023-01-04 VITALS
TEMPERATURE: 97.6 F | SYSTOLIC BLOOD PRESSURE: 190 MMHG | RESPIRATION RATE: 20 BRPM | HEIGHT: 67 IN | DIASTOLIC BLOOD PRESSURE: 88 MMHG | WEIGHT: 219.8 LBS | BODY MASS INDEX: 34.5 KG/M2 | OXYGEN SATURATION: 97 % | HEART RATE: 77 BPM

## 2023-01-04 PROBLEM — I65.23 BILATERAL CAROTID ARTERY STENOSIS: Status: ACTIVE | Noted: 2023-01-04

## 2023-01-04 LAB
ANION GAP SERPL CALC-SCNC: 7 MMOL/L (ref 5–15)
BASOPHILS # BLD: 0 K/UL (ref 0–0.1)
BASOPHILS NFR BLD: 0 % (ref 0–1)
BUN SERPL-MCNC: 17 MG/DL (ref 6–20)
BUN/CREAT SERPL: 14 (ref 12–20)
CALCIUM SERPL-MCNC: 8.4 MG/DL (ref 8.5–10.1)
CHLORIDE SERPL-SCNC: 109 MMOL/L (ref 97–108)
CO2 SERPL-SCNC: 27 MMOL/L (ref 21–32)
CREAT SERPL-MCNC: 1.24 MG/DL (ref 0.55–1.02)
DIFFERENTIAL METHOD BLD: ABNORMAL
EOSINOPHIL # BLD: 0.1 K/UL (ref 0–0.4)
EOSINOPHIL NFR BLD: 2 % (ref 0–7)
ERYTHROCYTE [DISTWIDTH] IN BLOOD BY AUTOMATED COUNT: 16.7 % (ref 11.5–14.5)
GLUCOSE SERPL-MCNC: 102 MG/DL (ref 65–100)
HCT VFR BLD AUTO: 30.5 % (ref 35–47)
HGB BLD-MCNC: 9.3 G/DL (ref 11.5–16)
IMM GRANULOCYTES # BLD AUTO: 0 K/UL (ref 0–0.04)
IMM GRANULOCYTES NFR BLD AUTO: 0 % (ref 0–0.5)
LYMPHOCYTES # BLD: 2 K/UL (ref 0.8–3.5)
LYMPHOCYTES NFR BLD: 43 % (ref 12–49)
MAGNESIUM SERPL-MCNC: 1.9 MG/DL (ref 1.6–2.4)
MCH RBC QN AUTO: 23.3 PG (ref 26–34)
MCHC RBC AUTO-ENTMCNC: 30.5 G/DL (ref 30–36.5)
MCV RBC AUTO: 76.3 FL (ref 80–99)
MONOCYTES # BLD: 0.3 K/UL (ref 0–1)
MONOCYTES NFR BLD: 7 % (ref 5–13)
NEUTS SEG # BLD: 2.3 K/UL (ref 1.8–8)
NEUTS SEG NFR BLD: 48 % (ref 32–75)
NRBC # BLD: 0 K/UL (ref 0–0.01)
NRBC BLD-RTO: 0 PER 100 WBC
PLATELET # BLD AUTO: 188 K/UL (ref 150–400)
PMV BLD AUTO: 9.8 FL (ref 8.9–12.9)
POTASSIUM SERPL-SCNC: 3.7 MMOL/L (ref 3.5–5.1)
RBC # BLD AUTO: 4 M/UL (ref 3.8–5.2)
SODIUM SERPL-SCNC: 143 MMOL/L (ref 136–145)
WBC # BLD AUTO: 4.8 K/UL (ref 3.6–11)

## 2023-01-04 PROCEDURE — 80048 BASIC METABOLIC PNL TOTAL CA: CPT

## 2023-01-04 PROCEDURE — 94760 N-INVAS EAR/PLS OXIMETRY 1: CPT

## 2023-01-04 PROCEDURE — G0378 HOSPITAL OBSERVATION PER HR: HCPCS

## 2023-01-04 PROCEDURE — 71275 CT ANGIOGRAPHY CHEST: CPT

## 2023-01-04 PROCEDURE — 97165 OT EVAL LOW COMPLEX 30 MIN: CPT | Performed by: OCCUPATIONAL THERAPIST

## 2023-01-04 PROCEDURE — 83735 ASSAY OF MAGNESIUM: CPT

## 2023-01-04 PROCEDURE — 74011250636 HC RX REV CODE- 250/636: Performed by: INTERNAL MEDICINE

## 2023-01-04 PROCEDURE — 97161 PT EVAL LOW COMPLEX 20 MIN: CPT

## 2023-01-04 PROCEDURE — 74011250637 HC RX REV CODE- 250/637: Performed by: INTERNAL MEDICINE

## 2023-01-04 PROCEDURE — 85025 COMPLETE CBC W/AUTO DIFF WBC: CPT

## 2023-01-04 PROCEDURE — 74011000636 HC RX REV CODE- 636: Performed by: INTERNAL MEDICINE

## 2023-01-04 PROCEDURE — 74011000250 HC RX REV CODE- 250: Performed by: INTERNAL MEDICINE

## 2023-01-04 PROCEDURE — 97535 SELF CARE MNGMENT TRAINING: CPT | Performed by: OCCUPATIONAL THERAPIST

## 2023-01-04 PROCEDURE — 97116 GAIT TRAINING THERAPY: CPT

## 2023-01-04 PROCEDURE — 36415 COLL VENOUS BLD VENIPUNCTURE: CPT

## 2023-01-04 RX ORDER — HYDROCHLOROTHIAZIDE 25 MG/1
25 TABLET ORAL DAILY
Status: DISCONTINUED | OUTPATIENT
Start: 2023-01-04 | End: 2023-01-04 | Stop reason: HOSPADM

## 2023-01-04 RX ORDER — LOSARTAN POTASSIUM 100 MG/1
100 TABLET ORAL DAILY
Status: DISCONTINUED | OUTPATIENT
Start: 2023-01-04 | End: 2023-01-04 | Stop reason: HOSPADM

## 2023-01-04 RX ORDER — DONEPEZIL HYDROCHLORIDE 5 MG/1
5 TABLET, FILM COATED ORAL
Qty: 1 TABLET | Refills: 0 | Status: SHIPPED
Start: 2023-01-04

## 2023-01-04 RX ADMIN — SODIUM CHLORIDE, PRESERVATIVE FREE 10 ML: 5 INJECTION INTRAVENOUS at 14:16

## 2023-01-04 RX ADMIN — AMLODIPINE BESYLATE 10 MG: 10 TABLET ORAL at 09:37

## 2023-01-04 RX ADMIN — LOSARTAN POTASSIUM 100 MG: 100 TABLET, FILM COATED ORAL at 10:22

## 2023-01-04 RX ADMIN — PANTOPRAZOLE SODIUM 40 MG: 40 TABLET, DELAYED RELEASE ORAL at 06:33

## 2023-01-04 RX ADMIN — HYDROCHLOROTHIAZIDE 25 MG: 25 TABLET ORAL at 10:22

## 2023-01-04 RX ADMIN — ENOXAPARIN SODIUM 40 MG: 100 INJECTION SUBCUTANEOUS at 09:36

## 2023-01-04 RX ADMIN — IOPAMIDOL 100 ML: 755 INJECTION, SOLUTION INTRAVENOUS at 09:15

## 2023-01-04 NOTE — PROGRESS NOTES
OCCUPATIONAL THERAPY EVALUATION/DISCHARGE  Patient: German Rucker (6 y.o. female)  Date: 1/4/2023  Primary Diagnosis: MELONIE (acute kidney injury) ([facility] [address] [N17.9]  Syncope [R55]       Precautions:        ASSESSMENT  Based on the objective data described below, the patient presents at an overall Mod I to Independent level for all basic self-care tasks. She demonstrates good safety awareness throughout. Educated on safety, with focus on bathroom/shower safety. Patient's BP has been elevated, but is trending down. The plan is for her to be discharged home, where she lives with her . No further acute care OT needs are identified at this time. Functional Outcome Measure: The patient scored 95/100 on the Barthel Index       PLAN :  Recommendation for discharge: (in order for the patient to meet his/her long term goals)  No skilled occupational therapy/ follow up rehabilitation needs identified at this time. This discharge recommendation:  Has been made in collaboration with the attending provider and/or case management    IF patient discharges home will need the following DME: none       SUBJECTIVE:   Patient stated I don't really know what happened. I know I ate dinner, and then I didn't feel well.  (at home prior to admission)    OBJECTIVE DATA SUMMARY:   HISTORY:   Past Medical History:   Diagnosis Date    Diverticula of colon     GERD (gastroesophageal reflux disease)     Hypertension     Joint pain      Past Surgical History:   Procedure Laterality Date    HX CATARACT REMOVAL      HX OTHER SURGICAL      tumor on right side    HX TUMOR REMOVAL      fatty tumor upper left arm       Prior Level of Function/Environment/Context: Independent, including driving  Expanded or extensive additional review of patient history:   Home Situation  Home Environment: Private residence  # Steps to Enter: 4  Rails to Enter: Yes  Hand Rails : Left (brick wall on the right)  One/Two Story Residence: Two story, live on 1st floor  Living Alone: No  Support Systems: Spouse/Significant Other, Child(nicole), Other Family Member(s)  Patient Expects to be Discharged to[de-identified] Home  Current DME Used/Available at Home: Cane, straight, Walker, rolling  Tub or Shower Type: Tub/Shower combination    Hand dominance: Right    EXAMINATION OF PERFORMANCE DEFICITS:  Cognitive/Behavioral Status:  Neurologic State: Alert  Orientation Level: Oriented X4  Cognition: Appropriate decision making; Follows commands; Appropriate safety awareness  Perception: Appears intact  Perseveration: No perseveration noted  Safety/Judgement: Home safety; Fall prevention      Hearing: Auditory  Auditory Impairment: None    Vision/Perceptual:                           Acuity: Within Defined Limits    Corrective Lenses: Reading glasses    Range of Motion:  AROM: Generally decreased, functional                         Strength:  Strength: Generally decreased, functional                Coordination:     Fine Motor Skills-Upper: Left Intact; Right Intact    Gross Motor Skills-Upper: Left Intact; Right Intact    Tone & Sensation:  Tone: Normal  Sensation: Intact                      Balance:  Sitting: Intact  Standing: Intact    Functional Mobility and Transfers for ADLs:  Bed Mobility:       Transfers:  Sit to Stand: Independent  Stand to Sit: Independent  Bed to Chair: Independent  Bathroom Mobility: Independent  Toilet Transfer : Independent    ADL Assessment:  Feeding: Independent    Oral Facial Hygiene/Grooming: Independent    Bathing: Modified independent    Type of Bath: Partial;[place]/Soap/Water    Upper Body Dressing: Independent    Lower Body Dressing: Modified independent    Toileting: Independent                ADL Intervention and task modifications:  Patient educated on general home safety and fall prevention. Discussed bathroom safety, including potential equipment needs. Patient may obtain a shower chair for home use.  Recommended having her  stand by when she first attempts a tub transfer at home. Briefly discussed energy conservation. Cognitive Retraining  Safety/Judgement: Home safety; Fall prevention    Functional Measure:    Barthel Index:  Bathin  Bladder: 10  Bowels: 10  Groomin  Dressing: 10  Feeding: 10  Mobility: 15  Stairs: 5  Toilet Use: 10  Transfer (Bed to Chair and Back): 15  Total: 95/100      The Barthel ADL Index: Guidelines  1. The index should be used as a record of what a patient does, not as a record of what a patient could do. 2. The main aim is to establish degree of independence from any help, physical or verbal, however minor and for whatever reason. 3. The need for supervision renders the patient not independent. 4. A patient's performance should be established using the best available evidence. Asking the patient, friends/relatives and nurses are the usual sources, but direct observation and common sense are also important. However direct testing is not needed. 5. Usually the patient's performance over the preceding 24-48 hours is important, but occasionally longer periods will be relevant. 6. Middle categories imply that the patient supplies over 50 per cent of the effort. 7. Use of aids to be independent is allowed. Score Interpretation (from [address] [address])    Independent   60-79 Minimally independent   40-59 Partially dependent   20-39 Very dependent   <20 Totally dependent     -Jennifer Hensley., Barthel, D.W. (1965). Functional evaluation: the Barthel Index. [address] [facility] ([address] (1997). The Barthel activities of daily living index: self-reporting versus actual performance in the old (> or = 75 years). Journal of [address] [place] [address] 45(7), [address] [facility], .JZEINAF, Sujatha Ferreira., Mabel Felty. (1999). Measuring the change in disability after inpatient rehabilitation; comparison of the responsiveness of the Barthel Index and Functional Hasty Measure.  Journal of Neurology, Neurosurgery, and Psychiatry, 66(4), 179-102. LUZ Brandt, SAMY Dunn, & Shaun Fernandes M.A. (2004) Assessment of post-stroke quality of life in cost-effectiveness studies: The usefulness of the Barthel Index and the EuroQoL-5D. Quality of Life Research, 15, 739-99        Occupational Therapy Evaluation Charge Determination   History Examination Decision-Making   LOW Complexity : Brief history review  LOW Complexity : 1-3 performance deficits relating to physical, cognitive , or psychosocial skils that result in activity limitations and / or participation restrictions  LOW Complexity : No comorbidities that affect functional and no verbal or physical assistance needed to complete eval tasks       Based on the above components, the patient evaluation is determined to be of the following complexity level: LOW   Pain Rating:  No complaints    Activity Tolerance:   Good    After treatment patient left in no apparent distress:    Sitting in chair and Call bell within reach    COMMUNICATION/EDUCATION:   The patients plan of care was discussed with: Physical therapist, Registered nurse, and Case management.      Thank you for this referral.  VERENICE Mcconnell/L  Time Calculation: 14 mins

## 2023-01-04 NOTE — PROGRESS NOTES
0830  Pt noted to have a very elevated BP. Pt denies headache, CP or any other discomfort. RN called MD to notify him. Per MD, RN should wait until pt comes back from CT and do orthostatics as she had a syncopal episode and we did not want to lower her BP too much. 46  Pt is back from CT. RN gave pt her coreg. RN performed orthostatic VS on pt. Pt's BP noted to be in the 200's (see flowsheets). Pt denies headache, CP or any other discomfort. MD notified. MD resumed home meds.

## 2023-01-04 NOTE — PROGRESS NOTES
Care Management Interventions  PCP Verified by CM: Yes (Dr. Renea Gibbs MD)  Last Visit to PCP: 12/30/22  Palliative Care Criteria Met (RRAT>21 & CHF Dx)?: No  Mode of Transport at Discharge: Other (see comment) (POV/Dtr)  Transition of Care Consult (CM Consult): Discharge Planning  MyChart Signup: No  Discharge Durable Medical Equipment: No  Physical Therapy Consult: Yes  Occupational Therapy Consult: Yes  Speech Therapy Consult: No  Support Systems: Spouse/Significant Other, Child(nicole), Other Family Member(s)  Confirm Follow Up Transport: Self  [facility] Resource Information Provided?: No  Discharge Location  Patient Expects to be Discharged to[de-identified] Home    Patient is being discharged home today. No needs identified. Patient is aware and agrees with dc plan. Patient aware to contact CM for any questions or concerns post discharge. RRAT: 5 LOW     Transition of Care (MAE) Plan:  Home when medically stable. MAE Transportation:       How is patient being transported at discharge? POV      When? Today        Is transport scheduled? N/a     Follow-up appointment and transportation:     PCP? Renea Gibbs MD 1/17 at 2:30pm      Who is transporting to the follow-up appointment? POV      Is transport for follow up appointment scheduled? N/a     Communication plan (with patient/family): Patient/family aware and agree with dc plan. Who is being called? Patient or Next of Kin? Responsible party? Patient       What number(s) is to be used? [phone]      What service provider is calling for [employer] services? [address]       When are they calling?  24--48 hours prior to dwaine       Click here to complete [address] including selection of the Healthcare Decision Maker Relationship (ie \"Primary\")  @[email or web address]

## 2023-01-04 NOTE — PROGRESS NOTES
duplicate cardiac monitoring orders - one placed on 1/2/23 for 48 hour and another placed on 1/3/23 for 48 hours - removed 1/2/23 order

## 2023-01-04 NOTE — PROGRESS NOTES
Bedside and Verbal shift change report given to TONG Ziegler (oncoming nurse) by Jason Winchester RN (offgoing nurse). Report included the following information SBAR, Kardex, MAR, Recent Results, and Med Rec Status.

## 2023-01-04 NOTE — PROGRESS NOTES
Problem: Falls - Risk of  Goal: *Absence of Falls  Description: Document Vincent Almanzar Fall Risk and appropriate interventions in the flowsheet.   Outcome: Progressing Towards Goal  Note: Fall Risk Interventions:            Medication Interventions: Teach patient to arise slowly                   Problem: Patient Education: Go to Patient Education Activity  Goal: Patient/Family Education  Outcome: Progressing Towards Goal     Problem: Hypertension  Goal: *Blood pressure within specified parameters  Outcome: Progressing Towards Goal  Goal: *Fluid volume balance  Outcome: Progressing Towards Goal

## 2023-01-04 NOTE — DISCHARGE INSTRUCTIONS
HOSPITALIST RECOMMENDATIONS    Need further testing planned  No driving until further testing completed  Tests Suggested  Vasc Sx Appt - Carotid angiogram  Event Monitor  Lay dowm promptly if get the same feeling - sleepy / weak    Drink plenty of fluids  Stop HCTZ for now  MANISH Robin, [address]

## 2023-01-04 NOTE — PROGRESS NOTES
PHYSICAL THERAPY EVALUATION/DISCHARGE  Patient: Angie Saleh (46 y.o. female)  Date: 1/4/2023  Primary Diagnosis: MELONIE (acute kidney injury) ([facility] [address] [N17.9]  Syncope [R55]       Precautions: BP         ASSESSMENT  Based on the objective data described below, the patient presents at an over all ind level for return to home. Functional Outcome Measure: The patient scored 10 sec on the 5 sit to stand test  outcome measure which is indicative of normal strength and low fall risk. Other factors to consider for discharge: Pt is ind with mobility and is ready for dc from a PT stand point      Further skilled acute physical therapy is not indicated at this time. PLAN :  Recommendation for discharge: (in order for the patient to meet his/her long term goals)  No skilled physical therapy/ follow up rehabilitation needs identified at this time. This discharge recommendation:  Has been made in collaboration with the attending provider and/or case management    IF patient discharges home will need the following DME: none       SUBJECTIVE:   Patient stated I am ready to go home.     OBJECTIVE DATA SUMMARY:   HISTORY:    Past Medical History:   Diagnosis Date    Diverticula of colon     GERD (gastroesophageal reflux disease)     Hypertension     Joint pain      Past Surgical History:   Procedure Laterality Date    HX CATARACT REMOVAL      HX OTHER SURGICAL      tumor on right side    HX TUMOR REMOVAL      fatty tumor upper left arm       Prior level of function: ind lives at home with    Personal factors and/or comorbidities impacting plan of care:  none    Home Situation  Home Environment: Private residence  # Steps to Enter: 4  Rails to Enter: Yes  [employer] : Left (brick wall on the right)  One/Two Story Residence: Two story, live on 1st floor  Living Alone: No  Support Systems: Spouse/Significant Other, Child(nicole), Other Family Member(s)  Patient Expects to be Discharged to[de-identified] Home  Current DME Used/Available at Home: Pa Rodolfo, straight, Walker, rolling  Tub or Shower Type: Tub/Shower combination    EXAMINATION/PRESENTATION/DECISION MAKING:   Critical Behavior:  Neurologic State: Alert  Orientation Level: Oriented X4  Cognition: Appropriate decision making, Follows commands, Appropriate safety awareness  Safety/Judgement: Home safety, Fall prevention  Hearing: Auditory  Auditory Impairment: None  Skin:    Edema: none noted   Range Of Motion:  AROM: Within functional limits                       Strength:    Strength: Within functional limits                    Tone & Sensation:   Tone: Normal              Sensation: Intact               Coordination: WNL     Vision:   Acuity: Within Defined Limits  Corrective Lenses: Reading glasses  Functional Mobility:  Bed Mobility:        Sit to Supine: Independent     Transfers:  Sit to Stand: Independent  Stand to Sit: Independent        Bed to Chair: Independent              Balance:   Sitting: Intact  Standing: Intact  Ambulation/Gait Training:  Distance (ft): 500 Feet (ft)  Assistive Device:  (no AD)  Ambulation - Level of Assistance: Independent                                                                Functional Measure:  10 sec 5 sit to stand        Physical Therapy Evaluation Charge Determination   History Examination Presentation Decision-Making   LOW Complexity : Zero comorbidities / personal factors that will impact the outcome / POC LOW Complexity : 1-2 Standardized tests and measures addressing body structure, function, activity limitation and / or participation in recreation  LOW Complexity : Stable, uncomplicated  LOW Complexity : FOTO score of       Based on the above components, the patient evaluation is determined to be of the following complexity level: LOW     Pain Rating:  Denies pain    Activity Tolerance:    WNL      After treatment patient left in no apparent distress:   Sitting in chair    COMMUNICATION/EDUCATION:   The patients plan of care was discussed with: Case management. Fall prevention education was provided and the patient/caregiver indicated understanding.     Thank you for this referral.  Marli Vazquez, PT   Time Calculation: 20 mins

## 2023-01-04 NOTE — PROGRESS NOTES
Occupational Therapy  OT consult received, chart reviewed. Attempted to see patient this morning for OT evaluation. Discussed with RN. Patient's BP is elevated (most recently 222/94). Will hold OT evaluation for now but continue to follow up as medically appropriate.

## 2023-01-04 NOTE — DISCHARGE SUMMARY
[facility]  Hospitalist Discharge Summary    Patient ID:  Henry Marquez  [id number]  81 y.o.  1940    PCP on record: Terrial Garden date: 1/2/2023  Discharge date and time: 1/4/2023     DISCHARGE DIAGNOSIS:    Principal Problem:    MELONIE (acute kidney injury) ([facility] [address] (1/2/2023)    Active Problems:    Syncope (1/2/2023)    Bilateral carotid artery stenosis (1/4/2023)    Essential hypertension (4/5/2016)    Mixed hyperlipidemia (4/5/2016)    Gastroesophageal reflux disease without esophagitis (4/5/2016)    CONSULTATIONS:  None    Excerpted HPI from H&P of Quynh Rodarte MD:  81 y.o. female presenting for admission to [facility] for further evaluation and treatment for MELONIE (acute kidney injury) ([facility] [address] She  has a past medical history of Diverticula of colon, GERD (gastroesophageal reflux disease), Hypertension, and Joint pain. With history of hypertension and hyperlipidemia presenting to the ED following a witnessed syncopal event while sitting at the dining table. After eating she was observed to suddenly slumped with loss of consciousness but no loss of respiration or seizure activity. There was no loss of bowel or bladder control. She was felt to have a regular pulse. She apparently was unresponsive for 1 to 2 minutes. Assessment in the ED was nonfocal.  CT scan unremarkable. Pressure was mildly hypotensive with the initial systolic blood pressure 752.  laboratory suggested some dehydration with acute kidney injury. She received 2 L of normal saline and was admitted to a monitored bed for further evaluation. Patient has complete absence of recollection during the episode. She remembers completing her meal but then became sleepy and laid her head down on the table. When she woke up her  was there, her daughter had arrived and they were talking about the ambulance coming. She denied any focal deficit. She denied symptoms of palpitation.   There was no sense of passing out. There was no feeling of being cold and clammy. She has not had prior vascular event. She has rested well without symptoms since admission    ______________________________________________________________________  DISCHARGE SUMMARY/HOSPITAL COURSE:  for full details see H&P, daily progress notes, labs, consult notes. Principal Problem:    MELONIE (acute kidney injury) ([address] (1/2/2023)  Patient received 2 L bolus of normal saline in the ED followed by 100 cc/h  Maintained for 24 hours  Repeat basic panel and magnesium improved  Stop HCTZ     Active Problems:    Syncope (1/2/2023)  Noticed sudden drop while at the dinner table  Sleepy, ? Dosed off  Hold current treatments of Cozaar, HCTZ, Hytrin to avoid hypotension  Admits to drinking poorly - will hold HCTZ on d/c  Maintain Norvasc 10 mg daily on adm. With elevation in BP resumed tx Cozaar and Hytrin  Monitor for orthostatic blood pressure drop or arrhythmia  No orthostatic drop following hydration  No signficant arrhythmia - could monitor more thorough with 7d Event Monitor  Follow for drop in hemoglobin - drop c/w degree of hydration. FOB was neg  Carotid duplex has some concern for significant occlusions - Vascular Sx referral for repeat Duplex, consider for angiogram  Slight drop in Hg with Hydration - low MCV s/o Fe Def       Essential hypertension (4/5/2016)  Monitor for orthostatic drop - none post hydration  Maintain current treatment with Norvasc 10 mg,Cozaar 100mg, and Hytrin 2mg  Hold HCTZ  May need adjustment in follow up to achieve control of HTN       Mixed hyperlipidemia (4/5/2016)  Resume statin therapy with Mevacor on discharge       Gastroesophageal reflux disease without esophagitis (4/5/2016)  Maintain PPI treatment with Protonix  R/o acute bleed - FOB neg     _______________________________________________________________________  Patient seen and examined by me on discharge day.   Pertinent Findings:  Visit Vitals  BP (!) 190/88 Pulse 65   Temp 97.6 °F (36.4 °C)   Resp 20   Ht 5' 7\" (1.702 m)   Wt 99.7 kg (219 lb 12.8 oz)   SpO2 97%   BMI 34.43 kg/m²     Gen:    Not in distress  Chest: Nonlabored respiration, Clear lungs  CVS:   Regular rhythm. No edema  Abd:  Soft, not distended, not tender  Neuro:  Alert, nonfocal    LABS:   5/26/22 06:57 1/2/23 19:52 1/4/23 05:18   WBC 5.6 5.6 4.8   NRBC 0.0 0.0 0.0   RBC 5.03 4.28 4.00   HGB 11.8 10.3 (L) 9.3 (L)   HCT 38.6 34.0 (L) 30.5 (L)   MCV 76.7 (L) 79.4 (L) 76.3 (L)   MCH 23.5 (L) 24.1 (L) 23.3 (L)   MCHC 30.6 30.3 30.5   RDW 17.0 (H) 16.7 (H) 16.7 (H)   PLATELET 523 143 007   MPV 9.9 10.0 9.8   NEUTROPHILS 43 61 48   LYMPHOCYTES 45 31 43   MONOCYTES 10 6 7   EOSINOPHILS 1 1 2   BASOPHILS 1 0 0        1/2/23 22:37   Color YELLOW/STRAW   Appearance CLEAR   Specific gravity 1.010   pH (UA) 6.0   Protein Negative   Glucose Negative   Ketone Negative   Blood Negative   Bilirubin Negative   Urobilinogen 0.2   Nitrites Negative   Leukocyte Esterase Negative      1/2/23 19:52   D-dimer 2.06 (H)        1/2/23 19:52 1/2/23 20:00 1/3/23 07:01 1/4/23 05:18   Sodium 138  145 143   Potassium 3.7  3.8 3.7   Chloride 102  110 (H) 109 (H)   CO2 28  28 27   Anion gap 8  7 7   Glucose 223 (H)  111 (H) 102 (H)   BUN 25 (H)  20 17   Creatinine 1.84 (H)  1.42 (H) 1.24 (H)   BUN/Creatinine ratio 14  14 14   Calcium 9.1  8.3 (L) 8.4 (L)   Magnesium 1.7   1.9   eGFR 27 (L)  37 (L) 44 (L)   Bilirubin, total 0.3      Protein, total 6.3 (L)      Albumin 3.2 (L)      Globulin 3.1      A-G Ratio 1.0 (L)      ALT 16      AST 15      Alk. phosphatase 95      Troponin-High Sensitivity  8        1/2/23 21:25   Occult blood, stool Negative     EKG: NSR 76 bpm, Normal, NSC May 2022     Radiology:  pCXR 1/2:  A single view of the chest demonstrates clear lungs. Mild cardiomegaly  is again noted. The bones and soft tissues are within normal limits.    IMPRESSION: No acute process or change compared to the prior exam     CT HEAD 1/2:  The ventricles and sulci are normal in size, shape and configuration. There is  no significant white matter disease. There is no intracranial hemorrhage,  extra-axial collection, or mass effect. The basilar cisterns are open. No CT  evidence of acute infarct. The bone windows demonstrate no abnormalities. The visualized portions of the  paranasal sinuses and mastoid air cells are clear. IMPRESSION: No acute intracranial process. CTA CHEST w and w/o 1/4:  THYROID: Unremarkable. MEDIASTINUM/NOLBERTO: No mass or lymphadenopathy. HEART/PERICARDIUM: Trace pericardial effusion. Cardiomegaly. AORTA:  No aneurysm. PULMONARY ARTERIES:No pulmonary embolism. LUNGS/PLEURA: Small pleural effusions. Bibasilar atelectasis. No focal  consolidation. .  INCIDENTALLY IMAGED UPPER ABDOMEN: Left hepatic cyst, 3.6 cm. Few subtle areas  of hyper density in the liver. Several small gallstones. BONES: No destructive bone lesion. IMPRESSION   1. No acute pulmonary embolus. 2. Small pleural effusions and trace pericardial effusion. Cardiomegaly. CAROTID DUPLEX 1/3:   Bilateral atherosclerosis. Right and left external carotid arteries are patent. The vertebral arteries demonstrate bilateral antegrade flow. IMPRESSION: Bilateral carotid stenosis. Consider CT angiography neck.     _______________________________________________________________________  DISCHARGE MEDICATIONS:   Current Discharge Medication List        CONTINUE these medications which have CHANGED    Details   donepeziL (ARICEPT) 5 mg tablet Take 1 Tablet by mouth nightly. Qty: 1 Tablet, Refills: 0  Start date: 1/4/2023           CONTINUE these medications which have NOT CHANGED    Details   terazosin (HYTRIN) 2 mg capsule terazosin 2 mg capsule   Take 1 capsule every day by oral route. dicyclomine (BENTYL) 20 mg tablet Take 1 Tablet by mouth every six (6) hours as needed for Abdominal Cramps.   Qty: 15 Tablet, Refills: 0      mirabegron ER (MYRBETRIQ) 25 mg ER tablet Take 1 Tablet by mouth two (2) times a day. lovastatin (MEVACOR) 20 mg tablet       acetaminophen (TYLENOL ARTHRITIS PAIN PO) Take 1 Tablet by mouth as needed. amLODIPine (NORVASC) 10 mg tablet Take  by mouth daily. losartan (COZAAR) 100 mg tablet Take 100 mg by mouth daily. omeprazole (PRILOSEC) 40 mg capsule Take 40 mg by mouth daily. ERGOCALCIFEROL, VITAMIN D2, (VITAMIN D2 PO) Take  by mouth.      cyanocobalamin (VITAMIN B12) 500 mcg tablet Take 500 mcg by mouth daily. STOP taking these medications       hydroCHLOROthiazide (MICROZIDE) 12.5 mg capsule Comments:   Reason for Stopping:         polyethylene glycol (Miralax) 17 gram/dose powder Comments:   Reason for Stopping:         ondansetron (Zofran ODT) 4 mg disintegrating tablet Comments:   Reason for Stopping:         fluticasone propionate (FLONASE) 50 mcg/actuation nasal spray Comments:   Reason for Stopping:         ezetimibe (ZETIA) 10 mg tablet Comments:   Reason for Stopping:         diclofenac (VOLTAREN) 1 % gel Comments:   Reason for Stopping:         Vitamin D3-Menaquinone 7 1000-90 unit-mcg TbDi Comments:   Reason for Stopping:         DAILY MULTI-VITAMIN PO Comments:   Reason for Stopping:         RANITIDINE HCL (ZANTAC PO) Comments:   Reason for Stopping:         L. ACIDOPHILUS/B.BIFIDUM&LONGUM (HEALTHY COLON PO) Comments:   Reason for Stopping:         FOLIC ACID/MULTIVIT-MIN/LUTEIN (CENTRUM SILVER PO) Comments:   Reason for Stopping:           ONLY CHANGE DURING ADM:  Stopping HCTZ to reduce risk for dehydration    My Recommended  Diet: Cardiac, encourage fluids  Activity: Ad Madyson - no climbing / driving  Wound Care: none  Follow-up labs:  Follow BMP with BUN/Cr    HOSPITALIST RECOMMENDATIONS  Need further testing planned  No driving until further testing completed  Tests Suggested  [facility] Sx Appt - Carotid angiogram to be considered  Event Monitor to further assess for arrhythmia  Patient / Family Education:  Jyoti olmstead promptly if get the same feeling - sleepy / weak  Drink plenty of fluids  Stop HCTZ for now  MANISH Robin -[phone]    ______________________________________________________________________  DISPOSITION:    Home with Family: x   Home with HH/PT/OT/RN:    SNF/LTC:    TIARA:    OTHER:        Condition at Discharge:  Stable  _____________________________________________________________________  Follow up with:   PCP : Cherylene Ravel  Follow-up Information       Follow up With Specialties Details Why Contact Info    Darlene Robin Follow up in 1 week(s)  [facility]  [phone]            Total time in minutes spent coordinating this discharge (includes going over instructions, follow-up, prescriptions, and preparing report for sign off to her PCP) :35 minutes    Signed:  Anselmo Olea MD  [facility] Hospitalist  [phone]

## 2023-01-04 NOTE — PROGRESS NOTES
Problem: Falls - Risk of  Goal: *Absence of Falls  Description: Document Earma Amy Fall Risk and appropriate interventions in the flowsheet.   Outcome: Progressing Towards Goal  Note: Fall Risk Interventions:            Medication Interventions: Teach patient to arise slowly                   Problem: Patient Education: Go to Patient Education Activity  Goal: Patient/Family Education  Outcome: Progressing Towards Goal     Problem: Hypertension  Goal: *Blood pressure within specified parameters  Outcome: Progressing Towards Goal  Goal: *Fluid volume balance  Outcome: Progressing Towards Goal

## 2023-01-08 LAB
ATRIAL RATE: 76 BPM
CALCULATED P AXIS, ECG09: 37 DEGREES
CALCULATED R AXIS, ECG10: 48 DEGREES
CALCULATED T AXIS, ECG11: 62 DEGREES
DIAGNOSIS, 93000: NORMAL
P-R INTERVAL, ECG05: 176 MS
Q-T INTERVAL, ECG07: 400 MS
QRS DURATION, ECG06: 76 MS
QTC CALCULATION (BEZET), ECG08: 450 MS
VENTRICULAR RATE, ECG03: 76 BPM

## 2023-01-10 ENCOUNTER — HOSPITAL ENCOUNTER (EMERGENCY)
Age: 83
Discharge: HOME | End: 2023-01-10
Attending: EMERGENCY MEDICINE
Payer: MEDICARE

## 2023-01-10 VITALS
BODY MASS INDEX: 34.37 KG/M2 | SYSTOLIC BLOOD PRESSURE: 160 MMHG | DIASTOLIC BLOOD PRESSURE: 66 MMHG | TEMPERATURE: 98.8 F | OXYGEN SATURATION: 96 % | WEIGHT: 219 LBS | RESPIRATION RATE: 20 BRPM | HEIGHT: 67 IN | HEART RATE: 66 BPM

## 2023-01-10 DIAGNOSIS — E86.0 DEHYDRATION: ICD-10-CM

## 2023-01-10 DIAGNOSIS — R55 SYNCOPE AND COLLAPSE: Primary | ICD-10-CM

## 2023-01-10 LAB
ALBUMIN SERPL-MCNC: 3.5 G/DL (ref 3.5–5)
ALBUMIN/GLOB SERPL: 1.1 (ref 1.1–2.2)
ALP SERPL-CCNC: 93 U/L (ref 45–117)
ALT SERPL-CCNC: 18 U/L (ref 12–78)
ANION GAP SERPL CALC-SCNC: 9 MMOL/L (ref 5–15)
APPEARANCE UR: CLEAR
AST SERPL-CCNC: 16 U/L (ref 15–37)
BACTERIA URNS QL MICRO: NEGATIVE /HPF
BASOPHILS # BLD: 0 K/UL (ref 0–0.1)
BASOPHILS NFR BLD: 0 % (ref 0–1)
BILIRUB SERPL-MCNC: 0.3 MG/DL (ref 0.2–1)
BILIRUB UR QL: NEGATIVE
BUN SERPL-MCNC: 17 MG/DL (ref 6–20)
BUN/CREAT SERPL: 10 (ref 12–20)
CALCIUM SERPL-MCNC: 9.1 MG/DL (ref 8.5–10.1)
CHLORIDE SERPL-SCNC: 100 MMOL/L (ref 97–108)
CO2 SERPL-SCNC: 28 MMOL/L (ref 21–32)
COLOR UR: NORMAL
CREAT SERPL-MCNC: 1.69 MG/DL (ref 0.55–1.02)
DIFFERENTIAL METHOD BLD: ABNORMAL
EOSINOPHIL # BLD: 0 K/UL (ref 0–0.4)
EOSINOPHIL NFR BLD: 1 % (ref 0–7)
EPITH CASTS URNS QL MICRO: NORMAL /LPF
ERYTHROCYTE [DISTWIDTH] IN BLOOD BY AUTOMATED COUNT: 16.7 % (ref 11.5–14.5)
GLOBULIN SER CALC-MCNC: 3.3 G/DL (ref 2–4)
GLUCOSE SERPL-MCNC: 107 MG/DL (ref 65–100)
GLUCOSE UR STRIP.AUTO-MCNC: NEGATIVE MG/DL
HCT VFR BLD AUTO: 35.6 % (ref 35–47)
HGB BLD-MCNC: 10.9 G/DL (ref 11.5–16)
HGB UR QL STRIP: NEGATIVE
IMM GRANULOCYTES # BLD AUTO: 0 K/UL (ref 0–0.04)
IMM GRANULOCYTES NFR BLD AUTO: 0 % (ref 0–0.5)
KETONES UR QL STRIP.AUTO: NEGATIVE MG/DL
LEUKOCYTE ESTERASE UR QL STRIP.AUTO: NEGATIVE
LYMPHOCYTES # BLD: 1.5 K/UL (ref 0.8–3.5)
LYMPHOCYTES NFR BLD: 23 % (ref 12–49)
MCH RBC QN AUTO: 23.2 PG (ref 26–34)
MCHC RBC AUTO-ENTMCNC: 30.6 G/DL (ref 30–36.5)
MCV RBC AUTO: 75.9 FL (ref 80–99)
MONOCYTES # BLD: 0.3 K/UL (ref 0–1)
MONOCYTES NFR BLD: 5 % (ref 5–13)
NEUTS SEG # BLD: 4.5 K/UL (ref 1.8–8)
NEUTS SEG NFR BLD: 71 % (ref 32–75)
NITRITE UR QL STRIP.AUTO: NEGATIVE
NRBC # BLD: 0 K/UL (ref 0–0.01)
NRBC BLD-RTO: 0 PER 100 WBC
PH UR STRIP: 6 (ref 5–8)
PLATELET # BLD AUTO: 249 K/UL (ref 150–400)
PMV BLD AUTO: 10.1 FL (ref 8.9–12.9)
POTASSIUM SERPL-SCNC: 3.7 MMOL/L (ref 3.5–5.1)
PROT SERPL-MCNC: 6.8 G/DL (ref 6.4–8.2)
PROT UR STRIP-MCNC: NEGATIVE MG/DL
RBC # BLD AUTO: 4.69 M/UL (ref 3.8–5.2)
RBC #/AREA URNS HPF: NORMAL /HPF (ref 0–5)
SODIUM SERPL-SCNC: 137 MMOL/L (ref 136–145)
SP GR UR REFRACTOMETRY: 1.02 (ref 1–1.03)
TROPONIN-HIGH SENSITIVITY: 8 NG/L (ref 0–51)
UA: UC IF INDICATED,UAUC: NORMAL
UROBILINOGEN UR QL STRIP.AUTO: 0.2 EU/DL (ref 0.2–1)
WBC # BLD AUTO: 6.3 K/UL (ref 3.6–11)
WBC URNS QL MICRO: NORMAL /HPF (ref 0–4)

## 2023-01-10 PROCEDURE — 36415 COLL VENOUS BLD VENIPUNCTURE: CPT

## 2023-01-10 PROCEDURE — 74011250636 HC RX REV CODE- 250/636: Performed by: EMERGENCY MEDICINE

## 2023-01-10 PROCEDURE — 80053 COMPREHEN METABOLIC PANEL: CPT

## 2023-01-10 PROCEDURE — 96360 HYDRATION IV INFUSION INIT: CPT

## 2023-01-10 PROCEDURE — 96361 HYDRATE IV INFUSION ADD-ON: CPT

## 2023-01-10 PROCEDURE — 84484 ASSAY OF TROPONIN QUANT: CPT

## 2023-01-10 PROCEDURE — 93005 ELECTROCARDIOGRAM TRACING: CPT

## 2023-01-10 PROCEDURE — 85025 COMPLETE CBC W/AUTO DIFF WBC: CPT

## 2023-01-10 PROCEDURE — 99284 EMERGENCY DEPT VISIT MOD MDM: CPT

## 2023-01-10 PROCEDURE — 81001 URINALYSIS AUTO W/SCOPE: CPT

## 2023-01-10 RX ADMIN — SODIUM CHLORIDE 1000 ML: 9 INJECTION, SOLUTION INTRAVENOUS at 18:08

## 2023-01-10 NOTE — ED TRIAGE NOTES
Pt arrived by EMS for syncopal episode. Per EMS pt had a syncopal episode and was treated for a UTI recently but did not finish her antibiotics because she was feeling better. EMS reports pt was noted to be orthostatic, IV was placed and NS was hung. Pt is awake and alert  pt educated on ER flow. Patient and/or Family notified of acuity of the unit and on going construction. This writer apologized for any delay that may occur.

## 2023-01-10 NOTE — ED PROVIDER NOTES
[facility] EMERGENCY DEP  EMERGENCY DEPARTMENT ENCOUNTER       Pt Name: Sajan Anguiano  MRN: [id number]  [place] 1940  Date of evaluation: 1/10/2023  Provider: Kumar Liriano DO   PCP: Edis Bess  Note Started: 6:50 PM 1/10/23     CHIEF COMPLAINT       Chief Complaint   Patient presents with    Syncope     Hx of uti        HISTORY OF PRESENT ILLNESS: 1 or more elements      History From: Patient, History limited by: none     Sajan Anguiano is a 81 y.o. female who presents by EMS for evaluation following a syncopal episode. Patient had recently been admitted on 1/3/2023 and discharged on 1/5/2023 secondary to a syncopal episode with work-up and evaluation. Patient states that she was on an antibiotic for urinary tract infection. She states that she has not had much of an appetite and has had decreased p.o. intake overall. Per EMS patient was orthostatic with them. Patient arrives to the emergency department without any significant complaint. She denies any chest pain or shortness of breath. She denies any headache or vision changes. She denies any palpitations, lightheadedness or current dizziness. She denies any abdominal pain, nausea vomiting diarrhea. Nursing Notes were all reviewed and agreed with or any disagreements were addressed in the HPI. REVIEW OF SYSTEMS        Positives and Pertinent negatives as per HPI. Syncope.     PAST HISTORY     Past Medical History:  Past Medical History:   Diagnosis Date    Diverticula of colon     GERD (gastroesophageal reflux disease)     Hypertension     Joint pain        Past Surgical History:  Past Surgical History:   Procedure Laterality Date    HX CATARACT REMOVAL      HX OTHER SURGICAL      tumor on right side    HX TUMOR REMOVAL      fatty tumor upper left arm       Family History:  Family History   Problem Relation Age of Onset    Breast Cancer Mother     Diabetes Mother     Heart Disease Mother     Hypertension Mother        Social History:  Social History     Tobacco Use    Smoking status: Former     Packs/day: 0.50     Types: Cigarettes    Smokeless tobacco: Never   Vaping Use    Vaping Use: Never used   Substance Use Topics    Alcohol use: No     Alcohol/week: 0.0 standard drinks    Drug use: No       Allergies: Allergies   Allergen Reactions    Aspirin Nausea Only    Codeine Not Reported This Time       CURRENT MEDICATIONS      Previous Medications    ACETAMINOPHEN (TYLENOL ARTHRITIS PAIN PO)    Take 1 Tablet by mouth as needed. AMLODIPINE (NORVASC) 10 MG TABLET    Take  by mouth daily. CYANOCOBALAMIN (VITAMIN B12) 500 MCG TABLET    Take 500 mcg by mouth daily. DICYCLOMINE (BENTYL) 20 MG TABLET    Take 1 Tablet by mouth every six (6) hours as needed for Abdominal Cramps. DONEPEZIL (ARICEPT) 5 MG TABLET    Take 1 Tablet by mouth nightly. ERGOCALCIFEROL, VITAMIN D2, (VITAMIN D2 PO)    Take  by mouth. LOSARTAN (COZAAR) 100 MG TABLET    Take 100 mg by mouth daily. LOVASTATIN (MEVACOR) 20 MG TABLET        MIRABEGRON ER (MYRBETRIQ) 25 MG ER TABLET    Take 1 Tablet by mouth two (2) times a day. OMEPRAZOLE (PRILOSEC) 40 MG CAPSULE    Take 40 mg by mouth daily. TERAZOSIN (HYTRIN) 2 MG CAPSULE    terazosin 2 mg capsule   Take 1 capsule every day by oral route. SCREENINGS               No data recorded         PHYSICAL EXAM      ED Triage Vitals [01/10/23 1703]   ED Encounter Vitals Group      /65      Pulse (Heart Rate) 84      Resp Rate 18      Temp 98.8 °F (37.1 °C)      Temp src       O2 Sat (%) 93 %      Weight 219 lb      Height 5' 7\"        Physical Exam  Vitals and nursing note reviewed. Constitutional:       Appearance: She is well-developed. Comments: elderly female appears fatigued no acute distress   HENT:      Head: Normocephalic and atraumatic. Right Ear: External ear normal.      Left Ear: External ear normal.      Mouth/Throat:      Mouth: Mucous membranes are dry.    Eyes: Extraocular Movements: Extraocular movements intact. Conjunctiva/sclera: Conjunctivae normal.      Pupils: Pupils are equal, round, and reactive to light. Neck:      Vascular: No JVD. Trachea: No tracheal deviation. Cardiovascular:      Rate and Rhythm: Normal rate and regular rhythm. Heart sounds: Normal heart sounds. No murmur heard. Pulmonary:      Effort: Pulmonary effort is normal. No respiratory distress. Breath sounds: Normal breath sounds. No stridor. No wheezing or rales. Abdominal:      General: Bowel sounds are normal. There is no distension. Palpations: Abdomen is soft. Tenderness: There is no abdominal tenderness. There is no guarding or rebound. Musculoskeletal:         General: No tenderness. Normal range of motion. Cervical back: Normal range of motion and neck supple. Right lower leg: No edema. Left lower leg: No edema. Skin:     General: Skin is warm and dry. Capillary Refill: Capillary refill takes 2 to 3 seconds. Neurological:      Mental Status: She is alert and oriented to person, place, and time. Cranial Nerves: No cranial nerve deficit.       Comments: No nystagmus, no dysmetria, no focal motor or sensory deficits   Psychiatric:         Behavior: Behavior normal.        DIAGNOSTIC RESULTS   LABS:     Recent Results (from the past 12 hour(s))   EKG, 12 LEAD, INITIAL    Collection Time: 01/10/23  5:50 PM   Result Value Ref Range    Ventricular Rate 68 BPM    Atrial Rate 68 BPM    P-R Interval 186 ms    QRS Duration 80 ms    Q-T Interval 500 ms    QTC Calculation (Bezet) 531 ms    Calculated P Axis 22 degrees    Calculated R Axis 48 degrees    Calculated T Axis 66 degrees    Diagnosis       Sinus rhythm with premature supraventricular complexes  Nonspecific T wave abnormality  Abnormal ECG  When compared with ECG of 02-JAN-2023 19:50,  premature supraventricular complexes are now present  QT has lengthened     CBC WITH AUTOMATED DIFF    Collection Time: 01/10/23  6:19 PM   Result Value Ref Range    WBC 6.3 3.6 - 11.0 K/uL    RBC 4.69 3.80 - 5.20 M/uL    HGB 10.9 (L) 11.5 - 16.0 g/dL    HCT 35.6 35.0 - 47.0 %    MCV 75.9 (L) 80.0 - 99.0 FL    MCH 23.2 (L) 26.0 - 34.0 PG    MCHC 30.6 30.0 - 36.5 g/dL    RDW 16.7 (H) 11.5 - 14.5 %    PLATELET 338 111 - 755 K/uL    MPV 10.1 8.9 - 12.9 FL    NRBC 0.0 0  WBC    ABSOLUTE NRBC 0.00 0.00 - 0.01 K/uL    NEUTROPHILS 71 32 - 75 %    LYMPHOCYTES 23 12 - 49 %    MONOCYTES 5 5 - 13 %    EOSINOPHILS 1 0 - 7 %    BASOPHILS 0 0 - 1 %    IMMATURE GRANULOCYTES 0 0.0 - 0.5 %    ABS. NEUTROPHILS 4.5 1.8 - 8.0 K/UL    ABS. LYMPHOCYTES 1.5 0.8 - 3.5 K/UL    ABS. MONOCYTES 0.3 0.0 - 1.0 K/UL    ABS. EOSINOPHILS 0.0 0.0 - 0.4 K/UL    ABS. BASOPHILS 0.0 0.0 - 0.1 K/UL    ABS. IMM. GRANS. 0.0 0.00 - 0.04 K/UL    DF AUTOMATED     METABOLIC PANEL, COMPREHENSIVE    Collection Time: 01/10/23  6:19 PM   Result Value Ref Range    Sodium 137 136 - 145 mmol/L    Potassium 3.7 3.5 - 5.1 mmol/L    Chloride 100 97 - 108 mmol/L    CO2 28 21 - 32 mmol/L    Anion gap 9 5 - 15 mmol/L    Glucose 107 (H) 65 - 100 mg/dL    BUN 17 6 - 20 MG/DL    Creatinine 1.69 (H) 0.55 - 1.02 MG/DL    BUN/Creatinine ratio 10 (L) 12 - 20      eGFR 30 (L) >60 ml/min/1.73m2    Calcium 9.1 8.5 - 10.1 MG/DL    Bilirubin, total 0.3 0.2 - 1.0 MG/DL    ALT (SGPT) 18 12 - 78 U/L    AST (SGOT) 16 15 - 37 U/L    Alk.  phosphatase 93 45 - 117 U/L    Protein, total 6.8 6.4 - 8.2 g/dL    Albumin 3.5 3.5 - 5.0 g/dL    Globulin 3.3 2.0 - 4.0 g/dL    A-G Ratio 1.1 1.1 - 2.2     TROPONIN-HIGH SENSITIVITY    Collection Time: 01/10/23  6:19 PM   Result Value Ref Range    Troponin-High Sensitivity 8 0 - 51 ng/L   URINALYSIS W/ REFLEX CULTURE    Collection Time: 01/10/23  6:33 PM    Specimen: Urine   Result Value Ref Range    Color YELLOW/STRAW      Appearance CLEAR CLEAR      Specific gravity 1.020 1.003 - 1.030      pH (UA) 6.0 5.0 - 8.0      Protein Negative NEG mg/dL    Glucose Negative NEG mg/dL    Ketone Negative NEG mg/dL    Bilirubin Negative NEG      Blood Negative NEG      Urobilinogen 0.2 0.2 - 1.0 EU/dL    Nitrites Negative NEG      Leukocyte Esterase Negative NEG      WBC 0-4 0 - 4 /hpf    RBC 0-5 0 - 5 /hpf    Epithelial cells FEW FEW /lpf    Bacteria Negative NEG /hpf    UA:UC IF INDICATED CULTURE NOT INDICATED BY UA RESULT CNI          EKG: If performed, independent interpretation documented below in the MDM section     RADIOLOGY:  Non-plain film images such as CT, Ultrasound and MRI are read by the radiologist. Plain radiographic images are visualized and preliminarily interpreted by the ED Provider with the findings documented in the MDM section. Interpretation per the Radiologist below, if available at the time of this note:          PROCEDURES   Unless otherwise noted below, none  Procedures     CRITICAL CARE TIME   None      EMERGENCY DEPARTMENT COURSE and DIFFERENTIAL DIAGNOSIS/MDM   Vitals:    Vitals:    01/10/23 1703 01/10/23 1840   BP: 132/65 (!) 149/64   Pulse: 84 64   Resp: 18 16   Temp: 98.8 °F (37.1 °C)    SpO2: 93% 96%   Weight: 99.3 kg (219 lb)    Height: 5' 7\" (1.702 m)         Patient was given the following medications:  Medications   sodium chloride 0.9 % bolus infusion 1,000 mL (1,000 mL IntraVENous New Bag 1/10/23 1808)       Medical Decision Making  Amount and/or Complexity of Data Reviewed  Labs: ordered. ECG/medicine tests: ordered. EKG obtained at 1750 reviewed by me sinus rhythm with PACs rate of 68 normal axis/WV/QRS nonspecific ST changes. ,     Suspect patient is dehydrated. Patient had a full work-up for syncope just recently. We will send off repeat labs patient will get a liter of IV fluid we will repeat urinalysis. Labs unremarkable with the exception of the slight elevation of the patient's creatinine. Patient looks and feels better following IV fluids. Urinalysis unremarkable.     Discussed with patient make sure that she is drinking water throughout the day and keep a jug beside her so that she is sipping on it not necessarily having to drink it in large amounts at 1 time. Patient will be discharged home family present at the bedside. FINAL IMPRESSION     1. Syncope and collapse    2. Dehydration          DISPOSITION/PLAN   Karen Deshpande's  results have been reviewed with her. She has been counseled regarding her diagnosis, treatment, and plan. She verbally conveys understanding and agreement of the signs, symptoms, diagnosis, treatment and prognosis and additionally agrees to follow up as discussed. She also agrees with the care-plan and conveys that all of her questions have been answered. I have also provided discharge instructions for her that include: educational information regarding their diagnosis and treatment, and list of reasons why they would want to return to the ED prior to their follow-up appointment, should her condition change. CLINICAL IMPRESSION    Discharge Note: The patient is stable for discharge home. The signs, symptoms, diagnosis, and discharge instructions have been discussed, understanding conveyed, and agreed upon. The patient is to follow up as recommended or return to ER should their symptoms worsen. PATIENT REFERRED TO:  Follow-up Information    None           DISCHARGE MEDICATIONS:  Current Discharge Medication List       No medication changes. DISCONTINUED MEDICATIONS:  Current Discharge Medication List          I am the Primary Clinician of Record. Sosa Records, DO (electronically signed)    (Please note that parts of this dictation were completed with voice recognition software. Quite often unanticipated grammatical, syntax, homophones, and other interpretive errors are inadvertently transcribed by the computer software. Please disregards these errors.  Please excuse any errors that have escaped final proofreading.)

## 2023-01-10 NOTE — ED NOTES
Pt has had phlebotomy attempts to draw blood per ED RN Manager, unable to obtain specimens, pt does not want further blood draws. Pt requires further incentive to have labs drawn?

## 2023-01-11 LAB
ATRIAL RATE: 68 BPM
CALCULATED P AXIS, ECG09: 22 DEGREES
CALCULATED R AXIS, ECG10: 48 DEGREES
CALCULATED T AXIS, ECG11: 66 DEGREES
DIAGNOSIS, 93000: NORMAL
P-R INTERVAL, ECG05: 186 MS
Q-T INTERVAL, ECG07: 500 MS
QRS DURATION, ECG06: 80 MS
QTC CALCULATION (BEZET), ECG08: 531 MS
VENTRICULAR RATE, ECG03: 68 BPM

## 2023-01-11 NOTE — ED NOTES
Discharge instructions reviewed and provided to Pt. Pt wheeled out to family members car for discharge.

## 2023-01-18 ENCOUNTER — APPOINTMENT (OUTPATIENT)
Dept: GENERAL RADIOLOGY | Age: 83
DRG: 683 | End: 2023-01-18
Attending: EMERGENCY MEDICINE
Payer: MEDICARE

## 2023-01-18 ENCOUNTER — HOSPITAL ENCOUNTER (INPATIENT)
Age: 83
LOS: 3 days | Discharge: HOME | DRG: 683 | End: 2023-01-21
Attending: EMERGENCY MEDICINE | Admitting: EMERGENCY MEDICINE
Payer: MEDICARE

## 2023-01-18 ENCOUNTER — APPOINTMENT (OUTPATIENT)
Dept: CT IMAGING | Age: 83
DRG: 683 | End: 2023-01-18
Attending: EMERGENCY MEDICINE
Payer: MEDICARE

## 2023-01-18 DIAGNOSIS — N17.9 ACUTE KIDNEY INJURY (HCC): ICD-10-CM

## 2023-01-18 DIAGNOSIS — R79.89 ELEVATED LACTIC ACID LEVEL: Primary | ICD-10-CM

## 2023-01-18 DIAGNOSIS — G93.40 ENCEPHALOPATHY: ICD-10-CM

## 2023-01-18 LAB
ALBUMIN SERPL-MCNC: 3.6 G/DL (ref 3.5–5)
ALBUMIN/GLOB SERPL: 1.1 (ref 1.1–2.2)
ALP SERPL-CCNC: 99 U/L (ref 45–117)
ALT SERPL-CCNC: 17 U/L (ref 12–78)
ANION GAP SERPL CALC-SCNC: 11 MMOL/L (ref 5–15)
APPEARANCE UR: CLEAR
AST SERPL-CCNC: 12 U/L (ref 15–37)
BACTERIA URNS QL MICRO: NEGATIVE /HPF
BASOPHILS # BLD: 0 K/UL (ref 0–0.1)
BASOPHILS NFR BLD: 0 % (ref 0–1)
BILIRUB SERPL-MCNC: 0.3 MG/DL (ref 0.2–1)
BILIRUB UR QL: NEGATIVE
BNP SERPL-MCNC: 178 PG/ML (ref 0–450)
BUN SERPL-MCNC: 18 MG/DL (ref 6–20)
BUN/CREAT SERPL: 11 (ref 12–20)
CALCIUM SERPL-MCNC: 9.3 MG/DL (ref 8.5–10.1)
CHLORIDE SERPL-SCNC: 102 MMOL/L (ref 97–108)
CO2 SERPL-SCNC: 25 MMOL/L (ref 21–32)
COLOR UR: ABNORMAL
CREAT SERPL-MCNC: 1.69 MG/DL (ref 0.55–1.02)
DIFFERENTIAL METHOD BLD: ABNORMAL
EOSINOPHIL # BLD: 0.1 K/UL (ref 0–0.4)
EOSINOPHIL NFR BLD: 1 % (ref 0–7)
EPITH CASTS URNS QL MICRO: ABNORMAL /LPF
ERYTHROCYTE [DISTWIDTH] IN BLOOD BY AUTOMATED COUNT: 16.5 % (ref 11.5–14.5)
FLUAV RNA SPEC QL NAA+PROBE: NOT DETECTED
FLUBV RNA SPEC QL NAA+PROBE: NOT DETECTED
GLOBULIN SER CALC-MCNC: 3.4 G/DL (ref 2–4)
GLUCOSE BLD STRIP.AUTO-MCNC: 140 MG/DL (ref 65–117)
GLUCOSE BLD STRIP.AUTO-MCNC: 158 MG/DL (ref 65–117)
GLUCOSE SERPL-MCNC: 131 MG/DL (ref 65–100)
GLUCOSE UR STRIP.AUTO-MCNC: NEGATIVE MG/DL
HCT VFR BLD AUTO: 34 % (ref 35–47)
HGB BLD-MCNC: 10.5 G/DL (ref 11.5–16)
HGB UR QL STRIP: NEGATIVE
IMM GRANULOCYTES # BLD AUTO: 0 K/UL (ref 0–0.04)
IMM GRANULOCYTES NFR BLD AUTO: 0 % (ref 0–0.5)
KETONES UR QL STRIP.AUTO: ABNORMAL MG/DL
LACTATE SERPL-SCNC: 2.5 MMOL/L (ref 0.4–2)
LACTATE SERPL-SCNC: 3.1 MMOL/L (ref 0.4–2)
LACTATE SERPL-SCNC: 5.7 MMOL/L (ref 0.4–2)
LEUKOCYTE ESTERASE UR QL STRIP.AUTO: NEGATIVE
LYMPHOCYTES # BLD: 2.4 K/UL (ref 0.8–3.5)
LYMPHOCYTES NFR BLD: 34 % (ref 12–49)
MAGNESIUM SERPL-MCNC: 1.9 MG/DL (ref 1.6–2.4)
MCH RBC QN AUTO: 23.1 PG (ref 26–34)
MCHC RBC AUTO-ENTMCNC: 30.9 G/DL (ref 30–36.5)
MCV RBC AUTO: 74.7 FL (ref 80–99)
MONOCYTES # BLD: 0.5 K/UL (ref 0–1)
MONOCYTES NFR BLD: 8 % (ref 5–13)
NEUTS SEG # BLD: 3.9 K/UL (ref 1.8–8)
NEUTS SEG NFR BLD: 57 % (ref 32–75)
NITRITE UR QL STRIP.AUTO: NEGATIVE
NRBC # BLD: 0 K/UL (ref 0–0.01)
NRBC BLD-RTO: 0 PER 100 WBC
PH UR STRIP: 6 (ref 5–8)
PLATELET # BLD AUTO: 222 K/UL (ref 150–400)
PMV BLD AUTO: 9.8 FL (ref 8.9–12.9)
POTASSIUM SERPL-SCNC: 4.3 MMOL/L (ref 3.5–5.1)
PROT SERPL-MCNC: 7 G/DL (ref 6.4–8.2)
PROT UR STRIP-MCNC: 30 MG/DL
RBC # BLD AUTO: 4.55 M/UL (ref 3.8–5.2)
RBC #/AREA URNS HPF: ABNORMAL /HPF (ref 0–5)
SARS-COV-2, COV2: NOT DETECTED
SERVICE CMNT-IMP: ABNORMAL
SERVICE CMNT-IMP: ABNORMAL
SODIUM SERPL-SCNC: 138 MMOL/L (ref 136–145)
SP GR UR REFRACTOMETRY: 1.02 (ref 1–1.03)
TROPONIN-HIGH SENSITIVITY: 9 NG/L (ref 0–51)
UA: UC IF INDICATED,UAUC: ABNORMAL
UROBILINOGEN UR QL STRIP.AUTO: 0.2 EU/DL (ref 0.2–1)
WBC # BLD AUTO: 6.8 K/UL (ref 3.6–11)
WBC URNS QL MICRO: ABNORMAL /HPF (ref 0–4)

## 2023-01-18 PROCEDURE — 74011250637 HC RX REV CODE- 250/637: Performed by: INTERNAL MEDICINE

## 2023-01-18 PROCEDURE — 83605 ASSAY OF LACTIC ACID: CPT

## 2023-01-18 PROCEDURE — 74011000250 HC RX REV CODE- 250: Performed by: EMERGENCY MEDICINE

## 2023-01-18 PROCEDURE — 87040 BLOOD CULTURE FOR BACTERIA: CPT

## 2023-01-18 PROCEDURE — 70450 CT HEAD/BRAIN W/O DYE: CPT

## 2023-01-18 PROCEDURE — 93005 ELECTROCARDIOGRAM TRACING: CPT

## 2023-01-18 PROCEDURE — 87636 SARSCOV2 & INF A&B AMP PRB: CPT

## 2023-01-18 PROCEDURE — 85025 COMPLETE CBC W/AUTO DIFF WBC: CPT

## 2023-01-18 PROCEDURE — 96361 HYDRATE IV INFUSION ADD-ON: CPT

## 2023-01-18 PROCEDURE — 80053 COMPREHEN METABOLIC PANEL: CPT

## 2023-01-18 PROCEDURE — 65270000029 HC RM PRIVATE

## 2023-01-18 PROCEDURE — 83735 ASSAY OF MAGNESIUM: CPT

## 2023-01-18 PROCEDURE — 81001 URINALYSIS AUTO W/SCOPE: CPT

## 2023-01-18 PROCEDURE — 84484 ASSAY OF TROPONIN QUANT: CPT

## 2023-01-18 PROCEDURE — 74011000250 HC RX REV CODE- 250: Performed by: INTERNAL MEDICINE

## 2023-01-18 PROCEDURE — 83880 ASSAY OF NATRIURETIC PEPTIDE: CPT

## 2023-01-18 PROCEDURE — 74011250636 HC RX REV CODE- 250/636: Performed by: EMERGENCY MEDICINE

## 2023-01-18 PROCEDURE — 71045 X-RAY EXAM CHEST 1 VIEW: CPT

## 2023-01-18 PROCEDURE — 36415 COLL VENOUS BLD VENIPUNCTURE: CPT

## 2023-01-18 PROCEDURE — 99285 EMERGENCY DEPT VISIT HI MDM: CPT

## 2023-01-18 PROCEDURE — 96360 HYDRATION IV INFUSION INIT: CPT

## 2023-01-18 PROCEDURE — 82962 GLUCOSE BLOOD TEST: CPT

## 2023-01-18 RX ORDER — ONDANSETRON 2 MG/ML
4 INJECTION INTRAMUSCULAR; INTRAVENOUS
Status: DISCONTINUED | OUTPATIENT
Start: 2023-01-18 | End: 2023-01-21 | Stop reason: HOSPADM

## 2023-01-18 RX ORDER — ACETAMINOPHEN 650 MG/1
650 SUPPOSITORY RECTAL
Status: DISCONTINUED | OUTPATIENT
Start: 2023-01-18 | End: 2023-01-21 | Stop reason: HOSPADM

## 2023-01-18 RX ORDER — PANTOPRAZOLE SODIUM 40 MG/1
40 TABLET, DELAYED RELEASE ORAL
Status: DISCONTINUED | OUTPATIENT
Start: 2023-01-19 | End: 2023-01-21 | Stop reason: HOSPADM

## 2023-01-18 RX ORDER — DONEPEZIL HYDROCHLORIDE 5 MG/1
5 TABLET, FILM COATED ORAL
Status: DISCONTINUED | OUTPATIENT
Start: 2023-01-18 | End: 2023-01-21 | Stop reason: HOSPADM

## 2023-01-18 RX ORDER — ONDANSETRON 4 MG/1
4 TABLET, ORALLY DISINTEGRATING ORAL
Status: DISCONTINUED | OUTPATIENT
Start: 2023-01-18 | End: 2023-01-21 | Stop reason: HOSPADM

## 2023-01-18 RX ORDER — SODIUM CHLORIDE 0.9 % (FLUSH) 0.9 %
5-40 SYRINGE (ML) INJECTION AS NEEDED
Status: DISCONTINUED | OUTPATIENT
Start: 2023-01-18 | End: 2023-01-21 | Stop reason: HOSPADM

## 2023-01-18 RX ORDER — SODIUM CHLORIDE 0.9 % (FLUSH) 0.9 %
5-40 SYRINGE (ML) INJECTION EVERY 8 HOURS
Status: DISCONTINUED | OUTPATIENT
Start: 2023-01-18 | End: 2023-01-21 | Stop reason: HOSPADM

## 2023-01-18 RX ORDER — ENOXAPARIN SODIUM 100 MG/ML
40 INJECTION SUBCUTANEOUS DAILY
Status: DISCONTINUED | OUTPATIENT
Start: 2023-01-19 | End: 2023-01-21 | Stop reason: HOSPADM

## 2023-01-18 RX ORDER — POLYETHYLENE GLYCOL 3350 17 G/17G
17 POWDER, FOR SOLUTION ORAL DAILY PRN
Status: DISCONTINUED | OUTPATIENT
Start: 2023-01-18 | End: 2023-01-21 | Stop reason: HOSPADM

## 2023-01-18 RX ORDER — SODIUM CHLORIDE 0.9 % (FLUSH) 0.9 %
5-10 SYRINGE (ML) INJECTION AS NEEDED
Status: DISCONTINUED | OUTPATIENT
Start: 2023-01-18 | End: 2023-01-21 | Stop reason: HOSPADM

## 2023-01-18 RX ORDER — ACETAMINOPHEN 325 MG/1
650 TABLET ORAL
Status: DISCONTINUED | OUTPATIENT
Start: 2023-01-18 | End: 2023-01-21 | Stop reason: HOSPADM

## 2023-01-18 RX ADMIN — SODIUM CHLORIDE 2 G: 9 INJECTION INTRAMUSCULAR; INTRAVENOUS; SUBCUTANEOUS at 18:00

## 2023-01-18 RX ADMIN — VANCOMYCIN HYDROCHLORIDE 2000 MG: 1 INJECTION, POWDER, LYOPHILIZED, FOR SOLUTION INTRAVENOUS at 18:18

## 2023-01-18 RX ADMIN — DONEPEZIL HYDROCHLORIDE 5 MG: 5 TABLET, FILM COATED ORAL at 21:38

## 2023-01-18 RX ADMIN — SODIUM CHLORIDE 1000 ML: 9 INJECTION, SOLUTION INTRAVENOUS at 16:13

## 2023-01-18 RX ADMIN — SODIUM CHLORIDE, PRESERVATIVE FREE 10 ML: 5 INJECTION INTRAVENOUS at 22:00

## 2023-01-18 NOTE — ED TRIAGE NOTES
Pt arrived via EMS with c/o lethargy and being found \"down face first in the bed\" by family around 1430. Per family she was normal at 12 noon and when they found her she was slow to respond and seemed confused. Per EMS she was recently seen and treated for a UTI and dehydration.  Upon assessment pt is shaky, oriented to self and place but disoriented to year

## 2023-01-18 NOTE — ED NOTES
TRANSFER - OUT REPORT:    Verbal report given to KALI Torres RN(name) on Rj Garcia  being transferred to Med Surg(unit) for routine progression of care       Report consisted of patients Situation, Background, Assessment and   Recommendations(SBAR). Information from the following report(s) SBAR, Kardex, ED Summary and MAR was reviewed with the receiving nurse. Lines:   Peripheral IV 01/18/23 Right Forearm (Active)   Site Assessment Clean, dry, & intact 01/18/23 1536   Phlebitis Assessment 0 01/18/23 1536   Infiltration Assessment 0 01/18/23 1536        Opportunity for questions and clarification was provided.       Patient transported with:  rain

## 2023-01-18 NOTE — ED PROVIDER NOTES
73-year-old female with a history of hypertension, GERD presents with a chief complaint of altered mental status and lethargy. Patient was seen by her primary yesterday and diagnosed with dehydration. She is also being treated for urinary tract infection. Today family found her laying face down on the bed with complaint of being tired and lethargic. EMS was called to scene and found her moving all of her extremities. She was oriented for them. Patient complains of no pain.        Past Medical History:   Diagnosis Date    Diverticula of colon     GERD (gastroesophageal reflux disease)     Hypertension     Joint pain        Past Surgical History:   Procedure Laterality Date    HX CATARACT REMOVAL      HX OTHER SURGICAL      tumor on right side    HX TUMOR REMOVAL      fatty tumor upper left arm         Family History:   Problem Relation Age of Onset    Breast Cancer Mother     Diabetes Mother     Heart Disease Mother     Hypertension Mother        Social History     Socioeconomic History    Marital status:      Spouse name: Not on file    Number of children: Not on file    Years of education: Not on file    Highest education level: Not on file   Occupational History    Not on file   Tobacco Use    Smoking status: Former     Packs/day: 0.50     Types: Cigarettes    Smokeless tobacco: Never   Vaping Use    Vaping Use: Never used   Substance and Sexual Activity    Alcohol use: No     Alcohol/week: 0.0 standard drinks    Drug use: No    Sexual activity: Not Currently     Partners: Male   Other Topics Concern    Not on file   Social History Narrative    Not on file     Social Determinants of Health     Financial Resource Strain: Not on file   Food Insecurity: Not on file   Transportation Needs: Not on file   Physical Activity: Not on file   Stress: Not on file   Social Connections: Not on file   Intimate Partner Violence: Not on file   Housing Stability: Not on file         ALLERGIES: Aspirin and Codeine    Review of Systems   Constitutional:  Positive for fatigue. Cardiovascular:  Negative for chest pain. Gastrointestinal:  Negative for abdominal pain. Vitals:    01/18/23 1522   BP: (!) 149/71   Pulse: 73   Resp: 20   Temp: 98.1 °F (36.7 °C)   SpO2: 99%   Weight: 99.3 kg (219 lb)   Height: 5' 7\" (1.702 m)            Physical Exam  Vitals and nursing note reviewed. Constitutional:       General: She is not in acute distress. Appearance: Normal appearance. She is not ill-appearing, toxic-appearing or diaphoretic. HENT:      Head: Normocephalic and atraumatic. Eyes:      Extraocular Movements: Extraocular movements intact. Cardiovascular:      Rate and Rhythm: Normal rate. Pulses: Normal pulses. Pulmonary:      Effort: Pulmonary effort is normal. No respiratory distress. Breath sounds: Rales (Faint bibasilar) present. Abdominal:      General: There is no distension. Musculoskeletal:         General: Normal range of motion. Cervical back: Normal range of motion. Skin:     General: Skin is dry. Neurological:      Mental Status: She is alert. Comments: No facial asymmetry. Moving all 4 extremities. Oriented to person and place but not time   Psychiatric:         Mood and Affect: Mood normal.        Medical Decision Making      Patient presents with altered mental status. Differential include but not limited to infection, intracranial hemorrhage, seizure among others. Rectal temperature is normal.  Patient is nontachycardic. White blood cell count is normal.  Hemoglobin slightly low at 10.5. Creatinine is elevated 1.69 which appears to be of above her baseline. Troponin normal.  BNP normal.  Electrolytes normal.  I have independently reviewed the obtained radiographic images and note no focal pneumonia on chest x-ray. Will await formal radiology read. CT head shows no intracranial hemorrhage.   Chest x-ray shows cardiomegaly with pulmonary vascular congestion according to the radiologist.  Certainly possible that this represents viral pneumonia. Lactic acid level returned significantly elevated at 5.7. Patient was given 1 L of IV fluids here in the ED. I have concerned that the elevated lactic acid level may be a false laboratory reading. A repeat was obtained and returned elevated as well at 3.1. At this point there is no source for infection although given her elevated lactic acid level and altered mental status I will start her on broad-spectrum antibiotics admitted to hospital medicine. She was not given a full 30 mL/kg bolus of IV fluid given concern for fluid overload and chest x-ray findings. Total critical care time spent exclusive of procedures:  37 minutes: Urinary      Amount and/or Complexity of Data Reviewed  Labs: ordered. Radiology: ordered. ECG/medicine tests: ordered. Risk  Prescription drug management. Decision regarding hospitalization. ED Course as of 01/18/23 1805 Wed Jan 18, 2023   1703 EKG shows sinus rhythm at a rate of 63, normal intervals, normal axis, no ST elevation or depression.   This EKG was interpreted by Della Bowie MD, ED Physician [RD]      ED Course User Index  [RD] Lorin Winter MD       Procedures

## 2023-01-19 PROBLEM — D50.9 MICROCYTIC ANEMIA: Chronic | Status: ACTIVE | Noted: 2023-01-19

## 2023-01-19 PROBLEM — D50.9 MICROCYTIC ANEMIA: Status: ACTIVE | Noted: 2023-01-19

## 2023-01-19 LAB
ANION GAP SERPL CALC-SCNC: 7 MMOL/L (ref 5–15)
BASOPHILS # BLD: 0 K/UL (ref 0–0.1)
BASOPHILS NFR BLD: 1 % (ref 0–1)
BUN SERPL-MCNC: 19 MG/DL (ref 6–20)
BUN/CREAT SERPL: 12 (ref 12–20)
CALCIUM SERPL-MCNC: 8.7 MG/DL (ref 8.5–10.1)
CHLORIDE SERPL-SCNC: 106 MMOL/L (ref 97–108)
CO2 SERPL-SCNC: 27 MMOL/L (ref 21–32)
CREAT SERPL-MCNC: 1.54 MG/DL (ref 0.55–1.02)
DIFFERENTIAL METHOD BLD: ABNORMAL
EOSINOPHIL # BLD: 0.1 K/UL (ref 0–0.4)
EOSINOPHIL NFR BLD: 2 % (ref 0–7)
ERYTHROCYTE [DISTWIDTH] IN BLOOD BY AUTOMATED COUNT: 16.7 % (ref 11.5–14.5)
GLUCOSE BLD STRIP.AUTO-MCNC: 125 MG/DL (ref 65–117)
GLUCOSE SERPL-MCNC: 110 MG/DL (ref 65–100)
HCT VFR BLD AUTO: 31.1 % (ref 35–47)
HGB BLD-MCNC: 9.8 G/DL (ref 11.5–16)
IMM GRANULOCYTES # BLD AUTO: 0 K/UL (ref 0–0.04)
IMM GRANULOCYTES NFR BLD AUTO: 0 % (ref 0–0.5)
LACTATE SERPL-SCNC: 1.3 MMOL/L (ref 0.4–2)
LYMPHOCYTES # BLD: 2.2 K/UL (ref 0.8–3.5)
LYMPHOCYTES NFR BLD: 38 % (ref 12–49)
MAGNESIUM SERPL-MCNC: 1.8 MG/DL (ref 1.6–2.4)
MCH RBC QN AUTO: 23.5 PG (ref 26–34)
MCHC RBC AUTO-ENTMCNC: 31.5 G/DL (ref 30–36.5)
MCV RBC AUTO: 74.6 FL (ref 80–99)
MONOCYTES # BLD: 0.4 K/UL (ref 0–1)
MONOCYTES NFR BLD: 7 % (ref 5–13)
NEUTS SEG # BLD: 3 K/UL (ref 1.8–8)
NEUTS SEG NFR BLD: 52 % (ref 32–75)
NRBC # BLD: 0 K/UL (ref 0–0.01)
NRBC BLD-RTO: 0 PER 100 WBC
PLATELET # BLD AUTO: 198 K/UL (ref 150–400)
PMV BLD AUTO: 9.9 FL (ref 8.9–12.9)
POTASSIUM SERPL-SCNC: 3.7 MMOL/L (ref 3.5–5.1)
RBC # BLD AUTO: 4.17 M/UL (ref 3.8–5.2)
SERVICE CMNT-IMP: ABNORMAL
SODIUM SERPL-SCNC: 140 MMOL/L (ref 136–145)
WBC # BLD AUTO: 5.7 K/UL (ref 3.6–11)

## 2023-01-19 PROCEDURE — 97161 PT EVAL LOW COMPLEX 20 MIN: CPT

## 2023-01-19 PROCEDURE — 74011250636 HC RX REV CODE- 250/636: Performed by: INTERNAL MEDICINE

## 2023-01-19 PROCEDURE — 83735 ASSAY OF MAGNESIUM: CPT

## 2023-01-19 PROCEDURE — 65270000029 HC RM PRIVATE

## 2023-01-19 PROCEDURE — 97535 SELF CARE MNGMENT TRAINING: CPT

## 2023-01-19 PROCEDURE — 85025 COMPLETE CBC W/AUTO DIFF WBC: CPT

## 2023-01-19 PROCEDURE — 82607 VITAMIN B-12: CPT

## 2023-01-19 PROCEDURE — 82962 GLUCOSE BLOOD TEST: CPT

## 2023-01-19 PROCEDURE — 74011250637 HC RX REV CODE- 250/637: Performed by: INTERNAL MEDICINE

## 2023-01-19 PROCEDURE — 94760 N-INVAS EAR/PLS OXIMETRY 1: CPT

## 2023-01-19 PROCEDURE — 74011000250 HC RX REV CODE- 250: Performed by: INTERNAL MEDICINE

## 2023-01-19 PROCEDURE — 80048 BASIC METABOLIC PNL TOTAL CA: CPT

## 2023-01-19 PROCEDURE — 83605 ASSAY OF LACTIC ACID: CPT

## 2023-01-19 PROCEDURE — 97165 OT EVAL LOW COMPLEX 30 MIN: CPT

## 2023-01-19 PROCEDURE — 36415 COLL VENOUS BLD VENIPUNCTURE: CPT

## 2023-01-19 PROCEDURE — 84443 ASSAY THYROID STIM HORMONE: CPT

## 2023-01-19 PROCEDURE — 74011000258 HC RX REV CODE- 258: Performed by: INTERNAL MEDICINE

## 2023-01-19 RX ADMIN — DONEPEZIL HYDROCHLORIDE 5 MG: 5 TABLET, FILM COATED ORAL at 21:56

## 2023-01-19 RX ADMIN — CEFEPIME 2 G: 2 INJECTION, POWDER, FOR SOLUTION INTRAVENOUS at 20:15

## 2023-01-19 RX ADMIN — CEFEPIME 2 G: 2 INJECTION, POWDER, FOR SOLUTION INTRAVENOUS at 09:17

## 2023-01-19 RX ADMIN — ENOXAPARIN SODIUM 40 MG: 100 INJECTION SUBCUTANEOUS at 09:09

## 2023-01-19 RX ADMIN — VANCOMYCIN HYDROCHLORIDE 1000 MG: 1 INJECTION, POWDER, LYOPHILIZED, FOR SOLUTION INTRAVENOUS at 18:43

## 2023-01-19 RX ADMIN — SODIUM CHLORIDE, PRESERVATIVE FREE 10 ML: 5 INJECTION INTRAVENOUS at 06:43

## 2023-01-19 RX ADMIN — SODIUM CHLORIDE, PRESERVATIVE FREE 10 ML: 5 INJECTION INTRAVENOUS at 21:57

## 2023-01-19 RX ADMIN — SODIUM CHLORIDE, PRESERVATIVE FREE 10 ML: 5 INJECTION INTRAVENOUS at 14:00

## 2023-01-19 RX ADMIN — PANTOPRAZOLE SODIUM 40 MG: 40 TABLET, DELAYED RELEASE ORAL at 06:43

## 2023-01-19 NOTE — PROGRESS NOTES
Problem: Pressure Injury - Risk of  Goal: *Prevention of pressure injury  Description: Document Juan Scale and appropriate interventions in the flowsheet. Outcome: Progressing Towards Goal  Note: Pressure Injury Interventions:             Activity Interventions: Increase time out of bed    Mobility Interventions: HOB 30 degrees or less    Nutrition Interventions: Document food/fluid/supplement intake

## 2023-01-19 NOTE — PROGRESS NOTES
Spiritual Care Assessment/Progress Note  Mark Barillas      NAME: Misty Patten      MRN: [id number]  AGE: 81 y.o.  SEX: female  Taoist Affiliation: Caodaism   Language: English     1/19/2023     Total Time (in minutes): 14     Spiritual Assessment begun in \[facility]\"" MED/SURG through conversation with:         [x]Patient        [] Family    [] Friend(s)        Reason for Consult: Initial/Spiritual assessment, patient floor     Spiritual beliefs: (Please include comment if needed)     [x] Identifies with a jessica tradition:         [] Supported by a jessica community:            [] Claims no spiritual orientation:           [] Seeking spiritual identity:                [] Adheres to an individual form of spirituality:           [] Not able to assess:                           Identified resources for coping:      [] Prayer                               [] Music                  [] Guided Imagery     [x] Family/friends                 [] Pet visits     [] Devotional reading                         [] Unknown     [] Other:                                               Interventions offered during this visit: (See comments for more details)    Patient Interventions: Affirmation of emotions/emotional suffering, Affirmation of jessica, Iconic (affirming the presence of God/Higher Power), Initial/Spiritual assessment, patient floor, Prayer (assurance of)           Plan of Care:     [x] Support spiritual and/or cultural needs    [] Support AMD and/or advance care planning process      [] Support grieving process   [] Coordinate Rites and/or Rituals    [] Coordination with community clergy   [] No spiritual needs identified at this time   [] Detailed Plan of Care below (See Comments)  [] Make referral to Music Therapy  [] Make referral to Pet Therapy     [] Make referral to Addiction services  [] Make referral to [facility]  [] Make referral to Spiritual Care Partner  [] No future visits requested        [] Contact Spiritual Care for further referrals     Comments: INITIAL SPIRITUAL ASSESSMENT in Rm 122  Provided empathic listening and spiritual support  Advised of  Availability    [address] [place] [address]

## 2023-01-19 NOTE — PROGRESS NOTES
Problem: Mobility Impaired (Adult and Pediatric)  Goal: *Acute Goals and Plan of Care (Insert Text)  Outcome: Resolved/Met     Problem: Patient Education: Go to Patient Education Activity  Goal: Patient/Family Education  Outcome: Resolved/Met  PHYSICAL THERAPY EVALUATION/DISCHARGE  Patient: Beau Platt (75 y.o. female)  Date: 1/19/2023  Primary Diagnosis: [facility]ON[facility]acute kidney [facility]) ([facility] [N17.9]       Precautions: none         ASSESSMENT  Based on the objective data described below, the patient presents with B LE AROM and strength grossly WFL. Pt was able to demonstrate functional independence w/ all transfers and ambulation on level surfaces and stairs w/o AD and supervision to SBA only; no LOB and only minor SOB after walking >300'. Subsequently, pt does not warrant further skilled PT services at this time and she will be an evaluation only. She is being d/c'd to the care of the medical staff and MD on the unit. Vitals taken during session included: O2 sats 97% on RA and HR 68. Functional Outcome Measure: The patient scored 100/100 on the Barthel Index outcome measure which is indicative of being independent in basic self care. Other factors to consider for discharge: independent at baseline     Further skilled acute physical therapy is not indicated at this time. PLAN :  Recommendation for discharge: (in order for the patient to meet his/her long term goals)  No skilled physical therapy/ follow up rehabilitation needs identified at this time. This discharge recommendation:  Has not yet been discussed the attending provider and/or case management    IF patient discharges home will need the following DME: none       SUBJECTIVE:   Patient stated I'm doing just fine. I hope they say I can go home today.     OBJECTIVE DATA SUMMARY:   HISTORY:    Past Medical History:   Diagnosis Date    Diverticula of colon     GERD (gastroesophageal reflux disease)     Hypertension     Joint pain      Past Surgical History:   Procedure Laterality Date    HX CATARACT REMOVAL      HX OTHER SURGICAL      tumor on right side    HX TUMOR REMOVAL      fatty tumor upper left arm       Prior level of function: independent; no AD  Personal factors and/or comorbidities impacting plan of care: none    Home Situation  Home Environment: Private residence  # Steps to Enter: 4  Rails to Enter: Yes  Hand Rails : Left  One/Two Story Residence: Two story, live on 1st floor  Living Alone: No  Support Systems: Spouse/Significant Other, Child(nicole)  Patient Expects to be Discharged to[de-identified] Home  Current DME Used/Available at Home: None    EXAMINATION/PRESENTATION/DECISION MAKING:   Critical Behavior:  Neurologic State: Alert  Orientation Level: Oriented X4  Cognition: Follows commands  Safety/Judgement: Good awareness of safety precautions  Hearing: Auditory  Auditory Impairment: None  Range Of Motion:  AROM: Within functional limits           PROM: Within functional limits           Strength:    Strength:  Within functional limits                    Tone & Sensation:   Tone: Normal              Sensation: Intact               Coordination:  Coordination: Within functional limits  Vision:      Functional Mobility:  Bed Mobility:     Supine to Sit: Modified independent  Sit to Supine: Modified independent  Scooting: Independent  Transfers:  Sit to Stand: Independent  Stand to Sit: Independent                       Balance:   Sitting: Intact  Standing: Intact  Ambulation/Gait Training:              Gait Description (WDL): Within defined limits                                          Stairs:   Up/down 10 steps w/ L HR and SBA only              Physical Therapy Evaluation Charge Determination   History Examination Presentation Decision-Making   LOW Complexity : Zero comorbidities / personal factors that will impact the outcome / POC LOW Complexity : 1-2 Standardized tests and measures addressing body structure, function, activity limitation and / or participation in recreation  LOW Complexity : Stable, uncomplicated  LOW Complexity : FOTO score of       Based on the above components, the patient evaluation is determined to be of the following complexity level: LOW     Pain Rating:  denied    Activity Tolerance:   Good, Fair, SpO2 stable on RA, and observed SOB with activity      After treatment patient left in no apparent distress:   Sitting in chair and Call bell within reach    COMMUNICATION/EDUCATION:   The patients plan of care was discussed with: Occupational therapist and Registered nurse. Fall prevention education was provided and the patient/caregiver indicated understanding. and Patient/family have participated as able in goal setting and plan of care.     Thank you for this referral.  Tommas Dakins, PT   Time Calculation: 18 mins

## 2023-01-19 NOTE — H&P
[facility] Emergency Department   Admission History & Physical        1/18/2023 7:44 PM  Patient: Rj Garcia 1940  PCP: Carlie Rogers    HISTORY  Chief Complaint:   Chief Complaint   Patient presents with    Lethargy       HPI: 81 y.o. female presenting for admission to [facility] for further evaluation and treatment for MELONIE (acute kidney injury) ([address] She  has a past medical history of Diverticula of colon, GERD (gastroesophageal reflux disease), Hypertension, and Joint pain. .    Patient presents to the ED via EMS with lethargy and being found \"face down in the bed\" by family. She had been fine this morning through noon. Family subsequently found her in the bed facedown around 1430. At that time she was lethargic and difficult to respond. She seemed confused. EMS found her to be tremulous and disoriented. Assessment in the ED was remarkable for normal temperature and blood pressure. 0 of 4 SIRS criteria were present. Chemistries however documented MELONIE with a creatinine of 1.69 and a notably elevated lactic acid of 5.7. She received 1 L of normal saline as well as Maxipime 2 g and vancomycin 1000 mg. Repeat chemistries noted the improved lactate level of 3.1 following 1 L of saline, obtained about 1 hour after the initial testing. There was no leukocytosis or fever. Patient was admitted for treatment of MELONIE and possible sepsis. Today she does not remember anything until arrival to the ED. She awoke about 0900. She did not eat or take medications. She went to BR to urinate and felt tired she she layed back in bed. She not uncommonly will sleep on the abdomen and face in the pillow. She was found that way by her  who called their daughter to come help her. She was recently hospitalized January 2 with an MELONIE and history of syncope. Evaluation then noted bilateral carotid artery stenoses with a history of hypertension and hyperlipidemia.   At the time of that admission she was noted to have slumped while sitting at the dining room table. It was unclear whether she had fallen asleep or had experienced a TIA. That admission no arrhythmia was detected. She received fluids and was maintained on Norvasc 10 mg daily, and subsequently resumed the Cozaar and Hytrin. There was no evidence for orthostasis following hydration. No evidence for acute blood loss and stool was heme-negative. She was felt to have some carotid stenoses and was referred to vascular surgery for further assessment. Following that admission she was again seen in the ED with a syncopal episode. She had not completed her antibiotics for the recent UTI \"because she felt better\". EMS appreciated some orthostasis and she received IV fluids. She was seen by Dr. Wright Every in follow-up January 17. She was prescribed Hytrin 2 mg with HCTZ 12.5 mg. He was given refills on Cozaar 100 mg daily, Hytrin 2 mg daily and Norvasc 10 mg daily with HCTZ 12.5 mg daily. He has a history of dementia and prior treatment with Aricept 10 mg. In the past she also had received Elavil 25 mg and Myrbetriq 25 mg. She is noted to have office follow-up with Dr. Wright Every on March 22 and April 20 and OB/GYN follow-up October 2024. No labs are reported back on [facility] labs list at this time. Pt thinks she received a new Rx yesterday, but she hadn't taken any medicine today to her knowledge. She usually takes 4 tabs in AM, and 1 in PM (one she also takes in AM). Med list notes Myrbetriq 25mg bid. Other AM meds ? Norvasc 10mg, HCTZ 25mg, and Cozaar 100mg.         Past Medical History:  Past Medical History:   Diagnosis Date    Diverticula of colon     GERD (gastroesophageal reflux disease)     Hypertension     Joint pain        Past Surgical History:  Past Surgical History:   Procedure Laterality Date    HX CATARACT REMOVAL      HX OTHER SURGICAL      tumor on right side    HX TUMOR REMOVAL      fatty tumor upper left arm Medication:  Prior to Admission medications    Medication Sig Start Date End Date Taking? Authorizing Provider   donepeziL (ARICEPT) 5 mg tablet Take 1 Tablet by mouth nightly. 1/4/23  Yes Malgorzata Ashby MD   terazosin (HYTRIN) 2 mg capsule terazosin 2 mg capsule   Take 1 capsule every day by oral route. Yes Other, MD Tony   dicyclomine (BENTYL) 20 mg tablet Take 1 Tablet by mouth every six (6) hours as needed for Abdominal Cramps. 9/27/21  Yes Jame Alcaraz MD   mirabegron ER (MYRBETRIQ) 25 mg ER tablet Take 1 Tablet by mouth two (2) times a day. Yes Other, MD Tony   lovastatin (MEVACOR) 20 mg tablet    Yes Other, MD Tony   acetaminophen (TYLENOL ARTHRITIS PAIN PO) Take 1 Tablet by mouth as needed. Yes Other, MD Tony   amLODIPine (NORVASC) 10 mg tablet Take  by mouth daily. Yes Provider, Historical   losartan (COZAAR) 100 mg tablet Take 100 mg by mouth daily. Yes Provider, Historical   omeprazole (PRILOSEC) 40 mg capsule Take 40 mg by mouth daily. Yes Provider, Historical   ERGOCALCIFEROL, VITAMIN D2, (VITAMIN D2 PO) Take  by mouth. Yes Provider, Historical   cyanocobalamin (VITAMIN B12) 500 mcg tablet Take 500 mcg by mouth daily. Yes Provider, Historical       Allergies:   Allergies   Allergen Reactions    Aspirin Nausea Only    Codeine Not Reported This Time       Social History:  Social History     Tobacco Use    Smoking status: Former     Packs/day: 0.50     Types: Cigarettes    Smokeless tobacco: Never   Vaping Use    Vaping Use: Never used   Substance Use Topics    Alcohol use: No     Alcohol/week: 0.0 standard drinks    Drug use: No       Family History:  Family History   Problem Relation Age of Onset    Breast Cancer Mother     Diabetes Mother     Heart Disease Mother     Hypertension Mother        ROS:  Total of 12 systems reviewed as follows:  POSITIVE= bolded text  Negative = text not bolded       General:  fever, chills, sweats, generalized weakness, weight loss/gain, loss of appetite   Eyes:    blurred vision, eye pain, loss of vision, double vision  ENT:    rhinorrhea, pharyngitis   Respiratory:  cough, sputum production, SOB, GIL, wheezing, pleuritic pain   Cardiology:   chest pain, palpitations, orthopnea, PND, edema, syncope   Gastrointestinal:  abdominal pain , N/V, diarrhea, dysphagia, constipation, bleeding   Genitourinary:  frequency, urgency, dysuria, hematuria, incontinence, prostatism   Muskuloskeletal: arthralgia, myalgia, back pain  Hematology:   easy bruising, nose or gum bleeding, lymphadenopathy   Dermatological: rash, ulceration, pruritis, color change / jaundice  Endocrine:   hot flashes or polydipsia   Neurological:  headache, dizziness, confusion, focal weakness, paresthesia, speech difficulties, memory loss, gait difficulty  Psychological: feelings of anxiety, depression, agitation      PHYSICAL EXAM:  Patient Vitals for the past 24 hrs:   Temp Pulse Resp BP SpO2   01/18/23 1836 97.9 °F (36.6 °C) 66 18 (!) 143/65 100 %   01/18/23 1824 -- 66 22 -- 100 %   01/18/23 1809 -- 66 24 126/67 100 %   01/18/23 1754 -- 63 (!) 0 134/63 99 %   01/18/23 1739 -- 63 16 133/63 93 %   01/18/23 1730 -- 64 18 (!) 119/56 97 %   01/18/23 1724 -- (!) 58 19 -- 96 %   01/18/23 1715 -- 64 16 (!) 127/58 99 %   01/18/23 1709 -- 64 13 -- 99 %   01/18/23 1700 -- 64 22 134/62 94 %   01/18/23 1654 -- 66 14 -- 96 %   01/18/23 1645 -- 66 17 (!) 126/49 98 %   01/18/23 1639 -- 60 17 (!) 100/58 97 %   01/18/23 1624 -- 63 21 -- 99 %   01/18/23 1539 -- 73 (!) 32 -- 100 %   01/18/23 1522 98.1 °F (36.7 °C) 73 20 (!) 149/71 99 %       General:    Alert, cooperative, no distress, appears stated age. HEENT: Atraumatic, anicteric sclerae, pink conjunctivae     No oral ulcers, mucosa moist, throat clear, dentition fair  Neck:  Supple, symmetrical;   thyroid non tender  Lungs:   Clear to auscultation bilaterally. No wheezing or rhonchi. No rales. Chest wall:  No tenderness.   No accessory muscle use.  Heart:   Regular rhythm. No  murmur. No edema  Abdomen:   Soft, non-tender. Not distended. Bowel sounds normal  Extremities: No cyanosis. No clubbing      Capillary refill normal,  Radial pulse 2+,  DP 1+  Skin:     Not pale. Not jaundiced. No rashes   Psych:  Not depressed. Not anxious or agitated. Neurologic: EOMs intact. No facial asymmetry. No aphasia or slurred speech. Symmetrical strength, Sensation grossly intact. Alert and oriented X 4. Lab Data Reviewed:    Recent Results (from the past 24 hour(s))   GLUCOSE, POC    Collection Time: 01/18/23  3:26 PM   Result Value Ref Range    Glucose (POC) 140 (H) 65 - 117 mg/dL    Performed by Umm Kan([facility])    IJMBK-80 WITH INFLUENZA A/B    Collection Time: 01/18/23  3:30 PM   Result Value Ref Range    SARS-CoV-2 by PCR Not detected NOTD      Influenza A by PCR Not detected NOTD      Influenza B by PCR Not detected NOTD     LACTIC ACID    Collection Time: 01/18/23  3:39 PM   Result Value Ref Range    Lactic acid 5.7 (HH) 0.4 - 2.0 MMOL/L   CBC WITH AUTOMATED DIFF    Collection Time: 01/18/23  3:39 PM   Result Value Ref Range    WBC 6.8 3.6 - 11.0 K/uL    RBC 4.55 3.80 - 5.20 M/uL    HGB 10.5 (L) 11.5 - 16.0 g/dL    HCT 34.0 (L) 35.0 - 47.0 %    MCV 74.7 (L) 80.0 - 99.0 FL    MCH 23.1 (L) 26.0 - 34.0 PG    MCHC 30.9 30.0 - 36.5 g/dL    RDW 16.5 (H) 11.5 - 14.5 %    PLATELET 228 592 - 950 K/uL    MPV 9.8 8.9 - 12.9 FL    NRBC 0.0 0  WBC    ABSOLUTE NRBC 0.00 0.00 - 0.01 K/uL    NEUTROPHILS 57 32 - 75 %    LYMPHOCYTES 34 12 - 49 %    MONOCYTES 8 5 - 13 %    EOSINOPHILS 1 0 - 7 %    BASOPHILS 0 0 - 1 %    IMMATURE GRANULOCYTES 0 0.0 - 0.5 %    ABS. NEUTROPHILS 3.9 1.8 - 8.0 K/UL    ABS. LYMPHOCYTES 2.4 0.8 - 3.5 K/UL    ABS. MONOCYTES 0.5 0.0 - 1.0 K/UL    ABS. EOSINOPHILS 0.1 0.0 - 0.4 K/UL    ABS. BASOPHILS 0.0 0.0 - 0.1 K/UL    ABS. IMM.  GRANS. 0.0 0.00 - 0.04 K/UL    DF AUTOMATED     METABOLIC PANEL, COMPREHENSIVE    Collection Time: 01/18/23  3:39 PM   Result Value Ref Range    Sodium 138 136 - 145 mmol/L    Potassium 4.3 3.5 - 5.1 mmol/L    Chloride 102 97 - 108 mmol/L    CO2 25 21 - 32 mmol/L    Anion gap 11 5 - 15 mmol/L    Glucose 131 (H) 65 - 100 mg/dL    BUN 18 6 - 20 MG/DL    Creatinine 1.69 (H) 0.55 - 1.02 MG/DL    BUN/Creatinine ratio 11 (L) 12 - 20      eGFR 30 (L) >60 ml/min/1.73m2    Calcium 9.3 8.5 - 10.1 MG/DL    Bilirubin, total 0.3 0.2 - 1.0 MG/DL    ALT (SGPT) 17 12 - 78 U/L    AST (SGOT) 12 (L) 15 - 37 U/L    Alk.  phosphatase 99 45 - 117 U/L    Protein, total 7.0 6.4 - 8.2 g/dL    Albumin 3.6 3.5 - 5.0 g/dL    Globulin 3.4 2.0 - 4.0 g/dL    A-G Ratio 1.1 1.1 - 2.2     TROPONIN-HIGH SENSITIVITY    Collection Time: 01/18/23  3:39 PM   Result Value Ref Range    Troponin-High Sensitivity 9 0 - 51 ng/L   NT-PRO BNP    Collection Time: 01/18/23  3:39 PM   Result Value Ref Range    NT pro- 0 - 450 PG/ML   MAGNESIUM    Collection Time: 01/18/23  3:39 PM   Result Value Ref Range    Magnesium 1.9 1.6 - 2.4 mg/dL   URINALYSIS W/ REFLEX CULTURE    Collection Time: 01/18/23  3:48 PM    Specimen: Urine   Result Value Ref Range    Color YELLOW/STRAW      Appearance CLEAR CLEAR      Specific gravity 1.025 1.003 - 1.030      pH (UA) 6.0 5.0 - 8.0      Protein 30 (A) NEG mg/dL    Glucose Negative NEG mg/dL    Ketone TRACE (A) NEG mg/dL    Bilirubin Negative NEG      Blood Negative NEG      Urobilinogen 0.2 0.2 - 1.0 EU/dL    Nitrites Negative NEG      Leukocyte Esterase Negative NEG      WBC 0-4 0 - 4 /hpf    RBC 0-5 0 - 5 /hpf    Epithelial cells FEW FEW /lpf    Bacteria Negative NEG /hpf    UA:UC IF INDICATED CULTURE NOT INDICATED BY UA RESULT CNI     EKG, 12 LEAD, INITIAL    Collection Time: 01/18/23  4:28 PM   Result Value Ref Range    Ventricular Rate 63 BPM    Atrial Rate 63 BPM    P-R Interval 188 ms    QRS Duration 78 ms    Q-T Interval 480 ms    QTC Calculation (Bezet) 491 ms    Calculated P Axis 42 degrees    Calculated R Axis 21 degrees    Calculated T Axis 47 degrees    Diagnosis       Normal sinus rhythm  Prolonged QT  Abnormal ECG  When compared with ECG of 10-BOUCHRA-2023 17:50,  premature supraventricular complexes are no longer present     LACTIC ACID    Collection Time: 01/18/23  5:10 PM   Result Value Ref Range    Lactic acid 3.1 (HH) 0.4 - 2.0 MMOL/L       EKG: NSR 63 bpm, Prolonged QT, no PACs as on prior 10Jan 22. Radiology:  CT HEAD WO CONT   Final Result   No acute process on noncontrast CT. No change. XR CHEST PORT   Final Result   Cardiomegaly with pulmonary vascular congestion. Care Plan discussed with:   Patient x    Family     RN x         Consultant      Expected  Disposition:   Home with Family x   HH/PT/OT/RN    SNF/LTC    TIARA      TOTAL TIME:  54 Minutes      Comments    x Reviewed previous records   >50% of visit spent in counseling and coordination of care x Discussion with patient and/or family and questions answered       _______________________________________________________  Given the patient's current clinical presentation, I have a high level of concern for decompensation if discharged from the emergency department. Complex decision making was performed, which includes reviewing the patient's available past medical records, laboratory results, and x-ray films. My assessment of this patient's clinical condition and my plan of care is as follows.     ASSESSMENT / PLAN    Principal Problem:    MELONIE (acute kidney injury) ([facility] [address] (1/2/2023)  With elevated Lactate  Improved Lactate following 1 liter NS  Repeat pending  No S/S infection  Plan holding antibiotics in AM unless indication noted   1/18/23 15:39 1/18/23 17:10   Lactic acid 5.7 (HH) 3.1 (HH)     Active Problems:    Essential hypertension (4/5/2016)  Possible excessive tx  Holding all meds for now      Mixed hyperlipidemia (4/5/2016)  No current tx listed  Mevacor on PTA lists      Gastroesophageal reflux disease without esophagitis (4/5/2016)  Using Prilosec daily PTA  Cont tx Protonix daily AM    SAFETY:   Code Status:Full  DVT prophylaxis:Lovenox  Stress Ulcer prophylaxis: Pepcid  Bladder catheter:no  Family Contact Info:  Primary Emergency Contact: Theresa Deshpande, [place] Phone: [phone]  Bedded: [facility] Room 122/01  Goals of Care: Empiric antibiotics, r/o Sepsis, IVF  Disposition: TBD, likely home when stable  Admission status:  Inpatient    -Tentative plan of care discussed with patient / family, who demonstrated understanding and is in agreement to the above  -Case was reviewed with the ED Provider, MD Richard Vera MD  [facility] Hospitalist  [phone]

## 2023-01-19 NOTE — PROGRESS NOTES
9 pm Lactic Acid down to 2.5, Perfect Served , lactic acid trending down ,no further lactic acid needed.

## 2023-01-19 NOTE — PROGRESS NOTES
Bedside and Verbal shift change report given to HELENE Balderas RN (oncoming nurse) by BHARATH Arrieta (offgoing nurse). Report included the following information SBAR and Kardex.

## 2023-01-19 NOTE — PROGRESS NOTES
OCCUPATIONAL THERAPY EVALUATION/DISCHARGE  Patient: Annika Metz (89 y.o. female)  Date: 1/19/2023  Primary Diagnosis: MELONIE (acute kidney injury) ([facility] [address] [N17.9]       Precautions: good safety awareness       ASSESSMENT  OT eval completed. Pt able to complete functional ambulation and ADLs independently with good safety awareness. Pt demo good dyn balance and activity tolerance during ADL completion. Pt is safe to mobilize and complete ADLs independently. Pt does not demonstrate need for skilled OT services at this time. Current Level of Function (ADLs/self-care): independent      Other factors to consider for discharge: pt lives with  and is able to complete all IADLs      PLAN :    Recommendation for discharge: (in order for the patient to meet his/her long term goals)  No skilled occupational therapy/ follow up rehabilitation needs identified at this time. This discharge recommendation:  Has not yet been discussed the attending provider and/or case management    IF patient discharges home will need the following DME: none       SUBJECTIVE:   Patient stated I am very independent .     OBJECTIVE DATA SUMMARY:   HISTORY:   Past Medical History:   Diagnosis Date    Diverticula of colon     GERD (gastroesophageal reflux disease)     Hypertension     Joint pain      Past Surgical History:   Procedure Laterality Date    HX CATARACT REMOVAL      HX OTHER SURGICAL      tumor on right side    HX TUMOR REMOVAL      fatty tumor upper left arm       Prior Level of Function/Environment/Context:   Expanded or extensive additional review of patient history:   Home Situation  Home Environment: Private residence  # Steps to Enter: 4  Rails to Enter: Yes  Hand Rails : Left  One/Two Story Residence: Two story, live on 1st floor  Living Alone: No  Support Systems: Spouse/Significant Other (Daughter lives in the area)  Patient Expects to be Discharged to[de-identified] Home  Current DME Used/Available at Home: None        EXAMINATION OF PERFORMANCE DEFICITS:  Cognitive/Behavioral Status:  Neurologic State: Alert  Orientation Level: Oriented X4  Cognition: Appropriate decision making; Appropriate safety awareness        Safety/Judgement: Good awareness of safety precautions    Skin: intact    Edema: edema    Hearing: Auditory  Auditory Impairment: None    Vision/Perceptual:            good                         Range of Motion:    AROM: Within functional limits  PROM: Within functional limits                      Strength:    Strength: Within functional limits                Coordination:  Coordination: Within functional limits            Tone & Sensation:    Tone: Normal  Sensation: Intact                      Balance:  Sitting: Intact  Standing: Intact    Functional Mobility and Transfers for ADLs:      Transfers:  Sit to Stand: Independent  Stand to Sit: Independent  Bathroom Mobility: Independent    ADL Assessment:     Pt completed standing basin bath at sink independently with good dyn balance and safety awareness. Pt completed oral care and dressing independently. No concerns. Oral Facial Hygiene/Grooming: Independent              Upper Body Dressing: Independent    Lower Body Dressing: Independent                     ADL Intervention and task modifications:  none     Grooming  Position Performed: Standing  Washing Face: Independent  Washing Hands: Independent  Brushing Teeth: Independent  Brushing/Combing Hair: Independent    Upper Body Bathing  Bathing Assistance: Independent  Position Performed: Standing         Lower Body Bathing  Perineal  : Independent  Position Performed: Standing    Upper Body [address] Gown: Modified independent; Independent    Lower Body Dressing Assistance  Underpants:  Independent         Cognitive Retraining  Safety/Judgement: Good awareness of safety precautions    Functional Measure:    Barthel Index:  Bathin  Bladder: 10  Bowels: 10  Groomin  Dressing: 10  Feeding: 10  Mobility: 15  Stairs: 10  Toilet Use: 10  Transfer (Bed to Chair and Back): 15  Total: 100/100      The Barthel ADL Index: Guidelines  1. The index should be used as a record of what a patient does, not as a record of what a patient could do. 2. The main aim is to establish degree of independence from any help, physical or verbal, however minor and for whatever reason. 3. The need for supervision renders the patient not independent. 4. A patient's performance should be established using the best available evidence. Asking the patient, friends/relatives and nurses are the usual sources, but direct observation and common sense are also important. However direct testing is not needed. 5. Usually the patient's performance over the preceding 24-48 hours is important, but occasionally longer periods will be relevant. 6. Middle categories imply that the patient supplies over 50 per cent of the effort. 7. Use of aids to be independent is allowed. Score Interpretation (from [address] [facility] [address])    Independent   60-79 Minimally independent   40-59 Partially dependent   20-39 Very dependent   <20 Totally dependent     -Jennifer Hensley., Barthel, D.W. (1965). Functional evaluation: the Barthel Index. [address] [facility] ([address] (1997). The Barthel activities of daily living index: self-reporting versus actual performance in the old (> or = 75 years). Journal of [address] [place] [address] 45(7), [facility], JAMEEL, Ryan Pedraza., Sai Carias. (1999). Measuring the change in disability after inpatient rehabilitation; comparison of the responsiveness of the Barthel Index and Functional [place] Measure. Journal of Neurology, Neurosurgery, and Psychiatry, 66(4), 139-295. Víctor Velasquez, N.J.A, SAMY Dunn, & Sabrina Odell, MLinaA. (2004) Assessment of post-stroke quality of life in cost-effectiveness studies: The usefulness of the Barthel Index and the EuroQoL-5D.  Quality of Life Research, 13, 555-58        Occupational Therapy Evaluation Charge Determination   History Examination Decision-Making   LOW Complexity : Brief history review  LOW Complexity : 1-3 performance deficits relating to physical, cognitive , or psychosocial skils that result in activity limitations and / or participation restrictions  LOW Complexity : No comorbidities that affect functional and no verbal or physical assistance needed to complete eval tasks       Based on the above components, the patient evaluation is determined to be of the following complexity level: LOW   Pain Ratin/10    Activity Tolerance:    WNL    After treatment patient left in no apparent distress:    Sitting in chair and Call bell within reach    COMMUNICATION/EDUCATION:   The patients plan of care was discussed with: Physical therapist.     Thank you for this referral.  Acacia Montanez OT  Time Calculation: 26 mins

## 2023-01-19 NOTE — PROGRESS NOTES
Care Management Interventions  PCP Verified by CM: Yes (Dr. Pepe Handley MD)  Last Visit to PCP: 01/17/23  Palliative Care Criteria Met (RRAT>21 & CHF Dx)?: No  Mode of Transport at Discharge: Other (see comment) (POV)  Transition of Care Consult (CM Consult): Discharge Planning  MyChart Signup: No  Discharge Durable Medical Equipment: No  Physical Therapy Consult: Yes  Occupational Therapy Consult: Yes  Speech Therapy Consult: No  Support Systems: Spouse/Significant Other, Child(nicole)  Confirm Follow Up Transport: Self   Resource Information Provided?: No  Discharge Location  Patient Expects to be Discharged to[de-identified] Home     Mrs. Monica Malik was admitted with MELONIE 1/18/23 under Inpatient status. She was here under Observation status 01/2/23 - 01/04/23 with Syncope/MELONIE and discharged to home with family. She attended her follow up PCP visit 1/17/23. Mrs. Monica Malik lives with her spouse outside of [place]. He is elderly and she relies on her daughter Ajit Galvan who lives locally. Mrs. Monica Malik has been independent: manages her care needs, able to drive, does not use DME. The IM was explained/signed/received: copy to patient, copy to  chart. The CM introductory letter with contact information was given. We discussed the Advance Directive when Mrs. Deshpande was here earlier this month. This date she has decided to complete one and appoint her daughter as primary healthcare decision maker. Aidan Matthews will be able to assist with this. Also at last admission we discussed the [facility] program. I gave Mrs. Deshpande a blank application and wrote down the items needed to file this. She has my contact information and I advised I will assist her with this application once she has the needed verifications. Daughter will be able to transport Mrs. Deshpande home at discharge. She does not feel she will need home health, but in actuality the three home health agencies serving the are do not take her [place]. Advance Care Planning     General Advance Care Planning (ACP) Conversation      Date of Conversation: 01/19/23    Conducted with: Patient with Decision Making Capacity    Healthcare Decision Maker:   No healthcare decision makers have been documented. Click here to complete [address] including selection of the Healthcare Decision Maker Relationship (ie \"Primary\")      Content/Action Overview:   Has NO ACP documents/care preferences - information provided, considering goals and options  Reviewed DNR/DNI and patient elects Full Code (Attempt Resuscitation)      Length of Voluntary ACP Conversation in minutes:  <16 minutes (Non-Billable)    Johnna Flynn            Reason for Admission:   MELONIE                     RUR Score:     15% MEDIUM                Plan for utilizing home health:   NO       PCP: First and Last name:  Ashlie Martin     Name of Practice: [id number] [place] [address]   Are you a current patient: Yes/No: YES   Approximate date of last visit: 01/17/23   Can you participate in a virtual visit with your PCP: YES                    Current Advanced Directive/Advance Care Plan: Full Code      Healthcare Decision Maker:   Click here to complete [address] including selection of the Healthcare Decision Maker Relationship (ie \"Primary\")                      Transition of Care Plan:  HOME

## 2023-01-19 NOTE — PROGRESS NOTES
Bedside shift change report given to [address] (oncoming nurse) by HELENE Bacon (offgoing nurse). Report included the following information SBAR, Kardex, Intake/Output, MAR, Recent Results, and Quality Measures.

## 2023-01-20 ENCOUNTER — APPOINTMENT (OUTPATIENT)
Dept: ULTRASOUND IMAGING | Age: 83
DRG: 683 | End: 2023-01-20
Attending: INTERNAL MEDICINE
Payer: MEDICARE

## 2023-01-20 LAB
ATRIAL RATE: 63 BPM
CALCULATED P AXIS, ECG09: 42 DEGREES
CALCULATED R AXIS, ECG10: 21 DEGREES
CALCULATED T AXIS, ECG11: 47 DEGREES
DIAGNOSIS, 93000: NORMAL
P-R INTERVAL, ECG05: 188 MS
Q-T INTERVAL, ECG07: 480 MS
QRS DURATION, ECG06: 78 MS
QTC CALCULATION (BEZET), ECG08: 491 MS
TSH SERPL DL<=0.05 MIU/L-ACNC: 4.08 UIU/ML (ref 0.36–3.74)
VANCOMYCIN SERPL-MCNC: 16.3 UG/ML
VENTRICULAR RATE, ECG03: 63 BPM

## 2023-01-20 PROCEDURE — 80202 ASSAY OF VANCOMYCIN: CPT

## 2023-01-20 PROCEDURE — 74011250637 HC RX REV CODE- 250/637: Performed by: INTERNAL MEDICINE

## 2023-01-20 PROCEDURE — 74011000258 HC RX REV CODE- 258: Performed by: INTERNAL MEDICINE

## 2023-01-20 PROCEDURE — 74011000250 HC RX REV CODE- 250: Performed by: INTERNAL MEDICINE

## 2023-01-20 PROCEDURE — 94760 N-INVAS EAR/PLS OXIMETRY 1: CPT

## 2023-01-20 PROCEDURE — 74011250636 HC RX REV CODE- 250/636: Performed by: INTERNAL MEDICINE

## 2023-01-20 PROCEDURE — 93306 TTE W/DOPPLER COMPLETE: CPT

## 2023-01-20 PROCEDURE — 36415 COLL VENOUS BLD VENIPUNCTURE: CPT

## 2023-01-20 PROCEDURE — 65270000046 HC RM TELEMETRY

## 2023-01-20 RX ORDER — HYDRALAZINE HYDROCHLORIDE 20 MG/ML
20 INJECTION INTRAMUSCULAR; INTRAVENOUS
Status: DISCONTINUED | OUTPATIENT
Start: 2023-01-20 | End: 2023-01-21 | Stop reason: HOSPADM

## 2023-01-20 RX ORDER — AMLODIPINE BESYLATE 10 MG/1
10 TABLET ORAL DAILY
Status: DISCONTINUED | OUTPATIENT
Start: 2023-01-20 | End: 2023-01-21 | Stop reason: HOSPADM

## 2023-01-20 RX ORDER — LOSARTAN POTASSIUM 25 MG/1
25 TABLET ORAL DAILY
Status: DISCONTINUED | OUTPATIENT
Start: 2023-01-20 | End: 2023-01-21 | Stop reason: HOSPADM

## 2023-01-20 RX ADMIN — DONEPEZIL HYDROCHLORIDE 5 MG: 5 TABLET, FILM COATED ORAL at 21:08

## 2023-01-20 RX ADMIN — HYDRALAZINE HYDROCHLORIDE 20 MG: 20 INJECTION INTRAMUSCULAR; INTRAVENOUS at 19:53

## 2023-01-20 RX ADMIN — SODIUM CHLORIDE, PRESERVATIVE FREE 10 ML: 5 INJECTION INTRAVENOUS at 06:34

## 2023-01-20 RX ADMIN — SODIUM CHLORIDE, PRESERVATIVE FREE 10 ML: 5 INJECTION INTRAVENOUS at 14:00

## 2023-01-20 RX ADMIN — SODIUM CHLORIDE, PRESERVATIVE FREE 10 ML: 5 INJECTION INTRAVENOUS at 21:07

## 2023-01-20 RX ADMIN — PANTOPRAZOLE SODIUM 40 MG: 40 TABLET, DELAYED RELEASE ORAL at 06:34

## 2023-01-20 RX ADMIN — ENOXAPARIN SODIUM 40 MG: 100 INJECTION SUBCUTANEOUS at 08:27

## 2023-01-20 RX ADMIN — AMLODIPINE BESYLATE 10 MG: 10 TABLET ORAL at 17:58

## 2023-01-20 RX ADMIN — CEFEPIME 2 G: 2 INJECTION, POWDER, FOR SOLUTION INTRAVENOUS at 08:27

## 2023-01-20 RX ADMIN — LOSARTAN POTASSIUM 25 MG: 25 TABLET, FILM COATED ORAL at 17:58

## 2023-01-20 NOTE — PROGRESS NOTES
Problem: Pressure Injury - Risk of  Goal: *Prevention of pressure injury  Description: Document Juan Scale and appropriate interventions in the flowsheet. Outcome: Progressing Towards Goal  Note: Pressure Injury Interventions: Activity Interventions: Increase time out of bed    Mobility Interventions: HOB 30 degrees or less    Nutrition Interventions: Document food/fluid/supplement intake, Offer support with meals,snacks and hydration                     Problem: Patient Education: Go to Patient Education Activity  Goal: Patient/Family Education  Outcome: Progressing Towards Goal     Problem: Falls - Risk of  Goal: *Absence of Falls  Description: Document Slick Fall Risk and appropriate interventions in the flowsheet.   Outcome: Progressing Towards Goal  Note: Fall Risk Interventions:            Medication Interventions: Teach patient to arise slowly                   Problem: Patient Education: Go to Patient Education Activity  Goal: Patient/Family Education  Outcome: Progressing Towards Goal

## 2023-01-20 NOTE — PROGRESS NOTES
IDR Team; MD, Nursing, Care Manager, Physical therapy,  and Dietician, met to review patient's plan of care. Discussed goals, interventions, barriers and progress. Team will continue to monitor progress and report any concerns to the physician and care management as indicated. Transition of Care Plan: Per MD, patient has had 3 dizziness spells in the past month. Asked nurse what she is running on monitor but nurse said patient is not currently on heart monitor. Will order telemetry Also need orthostatic blood pressure. PT said patient was eval only.

## 2023-01-20 NOTE — PROGRESS NOTES
Problem: Pressure Injury - Risk of  Goal: *Prevention of pressure injury  Description: Document Juan Scale and appropriate interventions in the flowsheet. Outcome: Progressing Towards Goal  Note: Pressure Injury Interventions: Activity Interventions: Increase time out of bed    Mobility Interventions: HOB 30 degrees or less    Nutrition Interventions: Document food/fluid/supplement intake                     Problem: Falls - Risk of  Goal: *Absence of Falls  Description: Document Slick Fall Risk and appropriate interventions in the flowsheet.   Outcome: Progressing Towards Goal  Note: Fall Risk Interventions:            Medication Interventions: Teach patient to arise slowly                   Problem: Patient Education: Go to Patient Education Activity  Goal: Patient/Family Education  Outcome: Progressing Towards Goal

## 2023-01-20 NOTE — PROGRESS NOTES
[facility]  Hospitalist Progress Note    NAME: Bryanna Thomas   :  1940   MRN:  [id number]     Total duration of encounter: 1 day      Interim Hospital Summary: 81 y.o. female who presented on 2023 with MELONIE (acute kidney injury) ([facility] [address] She has a past medical history of Diverticula of colon, GERD (gastroesophageal reflux disease), Hypertension, and Joint pain. .       Patient presenting following an apparent syncopal episode in her home, the third similar presentation this month  Today the patient's daughter was here and was able to relate her day yesterday in more detail    Patient was doing well in the morning  She went to the bedroom to urinate in the bathroom. After finishing she felt weak/tired and apparently slumped on the bed. When her daughter realized she had been gone for several minutes she went into the bedroom and found her lying stretched across the bed on her abdomen with her face down into the pillows. She was minimally arousable and EMS was contacted    Patient remembers nothing until she arrived in the emergency room  EMS described her as being tremulous and disoriented  In the ED she met 0 of 4's SIRS criteria but her lactic acid was elevated at 5.7. Following saline bolus and antibiotic therapy with Maxipime/vancomycin lactate improved to 3.1. She was admitted and received further hydration with normalization of lactate this morning. There was no leukocytosis or fever documented. Sepsis work-up did not confirm evidence of an infection by abdominal CT, urinalysis, chest x-ray. Daughter notes she had been feeling well that morning. The daughter notes she is not fully compliant with medications  She was seen by Dr. Radha Juarez and no medicine changes were made the day prior. Since she feels poorly if she sometimes withholds her routine medicines.     She feels she is prescribed four medicines which are taken in the morning and 1 of these isrepeated in the evening  Medicine list includes Myrbetriq 25 mg twice daily with morning Norvasc, HCTZ, Cozaar  Other listed medications include Hytrin, Elavil, Aricept    Pt was admitted following initial episode on Jan 2nd. Subsequent visit to ED 1/10 for Dehydration / Syncope dx with UTI   PCI     Subjective:     Chief Complaint / Reason for Physician Visit  \"better\".   Discussed with RN   Ready to get home    Review of Systems:  Symptom Y/N Comments  Symptom Y/N Comments   Fever/Chills n   Chest Pain n    Poor Appetite n   Edema n    Cough n   Abdominal Pain n    Sputum n   Joint Pain n    SOB/GIL n   Pruritis/Rash n    Nausea/vomit n   Tolerating PT/OT y    Diarrhea n   Tolerating Diet y    Constipation y   Other         Current Facility-Administered Medications:     [START ON 1/20/2023] VANCOMYCIN RANDOM LEVEL ORDERED TO BE DRAWN AT 4AM 1/20 : NURSING INFORMATION NOTE, , Other, Vinod Gutierrez MD    vancomycin (VANCOCIN) 1,000 mg in 0.9% sodium chloride 250 mL ([id number]), 1,000 mg, IntraVENous, Q24H, Shaun Clark MD, Last Rate: 250 mL/hr at 01/19/23 1843, 1,000 mg at 01/19/23 1843    sodium chloride (NS) flush 5-10 mL, 5-10 mL, IntraVENous, PRN, Shaun Clark MD    cefepime (MAXIPIME) 2 g in 0.9% sodium chloride (MBP/ADV) 100 mL MBP, 2 g, IntraVENous, Q12H, Shaun Clark MD, Last Rate: 25 mL/hr at 01/19/23 2015, 2 g at 01/19/23 2015    donepeziL (ARICEPT) tablet 5 mg, 5 mg, Oral, QHS, Shaun Clark MD, 5 mg at 01/18/23 2138    pantoprazole (PROTONIX) tablet 40 mg, 40 mg, Oral, ACB, Shaun Clark MD, 40 mg at 01/19/23 0643    sodium chloride (NS) flush 5-40 mL, 5-40 mL, IntraVENous, Q8H, Shaun Clark MD, 10 mL at 01/19/23 1400    sodium chloride (NS) flush 5-40 mL, 5-40 mL, IntraVENous, PRN, Shaun Clark MD    acetaminophen (TYLENOL) tablet 650 mg, 650 mg, Oral, Q6H PRN **OR** acetaminophen (TYLENOL) suppository 650 mg, 650 mg, Rectal, Q6H PRN, Shaun Clark MD    polyethylene glycol (MIRALAX) packet 17 g, 17 g, Oral, DAILY PRN, Carmita Carrillo MD    ondansetron (ZOFRAN ODT) tablet 4 mg, 4 mg, Oral, Q8H PRN **OR** ondansetron (ZOFRAN) injection 4 mg, 4 mg, IntraVENous, Q6H PRN, Carmita Carrillo MD    enoxaparin (LOVENOX) injection 40 mg, 40 mg, SubCUTAneous, DAILY, Carmita Carrillo MD, 40 mg at 01/19/23 0909    Objective:     VITALS:   Patient Vitals for the past 12 hrs:   Temp Pulse Resp BP SpO2   01/19/23 1644 97.8 °F (36.6 °C) 66 20 (!) 160/58 100 %       Intake/Output Summary (Last 24 hours) at 1/19/2023 2023  Last data filed at 1/19/2023 0919  Gross per 24 hour   Intake 240 ml   Output --   Net 240 ml        PHYSICAL EXAM:  General: WD, WN. Alert, cooperative, no acute distress    EENT:  EOMI. Anicteric sclerae. MMM  Resp:  CTA bilaterally, no wheezing or rales. No accessory muscle use  CV:  Regular  rhythm,  No edema  GI:  Soft, Non distended, Non tender. +Bowel sounds  Neurologic:  Alert and oriented X 3, normal speech, nonfocal  Psych:   Not anxious   Skin:  No rashes. No jaundice    LABS:  I reviewed today's most current labs and imaging studies. Pertinent labs include:  Recent Labs     01/19/23  0532 01/18/23  1539   WBC 5.7 6.8   HGB 9.8* 10.5*   HCT 31.1* 34.0*    222     Recent Labs     01/19/23  0532 01/18/23  1539    138   K 3.7 4.3    102   CO2 27 25   * 131*   BUN 19 18   CREA 1.54* 1.69*   CA 8.7 9.3   MG 1.8 1.9   ALB  --  3.6   TBILI  --  0.3   ALT  --  17      1/18/23 17:10 1/18/23 21:17 1/19/23 05:32   Lactic acid 3.1 (HH) 2.5 (HH) 1.3     CULTURES   1/18/23 15:30   Influenza A by PCR Not detected   Influenza B by PCR Not detected   SARS-COV-2  PCR Not detected     [facility] 1/18:  NG x 1d    EKG: NSR 63 bpm, Prolonged QT, no PACs as on prior 10Jan 22. Radiology:  CT HEAD WO CONT   Final Result   No acute process on noncontrast CT. No change. XR CHEST PORT   Final Result   Cardiomegaly with pulmonary vascular congestion.        Procedures: see electronic medical records for all procedures/Xrays and details which were not copied into this note but were reviewed prior to creation of Plan. Assessment / Plan:    Principal Problem:    MELONIE (acute kidney injury) ([facility] [address] (1/2/2023)  With elevated Lactate  Improved Lactate following 1 liter NS  Repeat this AM back to normal  No S/S infection  Plan holding antibiotics Fri AM unless indication noted  Concerning hx for inconsistency in medication usage  Monitor for arrhythmia    Active Problems:    Essential hypertension (4/5/2016)  Possible excessive tx  Holding all meds since admission  Orthostatic maneuver in AM  Resume one at a time  Montior labs as edccc       Mixed hyperlipidemia (4/5/2016)  No current tx listed  Mevacor on PTA lists       Gastroesophageal reflux disease without esophagitis (4/5/2016)  Using Prilosec daily PTA  Cont tx Protonix daily AM      Microcytic Anemia  Follow     SAFETY:   Code Status:Full  DVT prophylaxis:Lovenox  Stress Ulcer prophylaxis: Pepcid  Bladder catheter:no  Family Contact Info:  Primary Emergency Contact: Theresa Deshpande, [place] Phone: [phone]  Bedded: [facility] Room 122/01  Goals of Care: Empiric antibiotics, r/o Sepsis, IVF  Disposition: TBD, likely home when stable  Admission status:  Inpatient    Reviewed most current lab test results and cultures  YES  Reviewed most current radiology test results   YES  Review and summation of old records today    NO  Reviewed patient's current orders and MAR    YES  PMH/SH reviewed - no change compared to H&P    Care Plan discussed with:                                   Comments  Patient x     Family  x Daughter in room   RN x     Care Manager  x     Consultant                         x  Multidiciplinary team rounds were held today with , nursing, pharmacist and clinical coordinator. Patient's plan of care was discussed; medications were reviewed and discharge planning was addressed. ____________________________________________    Total NON Critical Care TIME:  35   Minutes        Comments   >50% of visit spent in counseling and coordination of care   x      Signed: Ree Lafleur MD  [facility] Hospitalist  [phone]

## 2023-01-20 NOTE — PROGRESS NOTES
[facility]  Hospitalist Progress Note    NAME: Clif Chamorro   :  1940   MRN:  [id number]     Total duration of encounter: 2 days       Admission HPI/Hospitalization Summary To Date:    81-year-old female admitted on 2023 with MELONIE, and recurrent syncope with third such episode in about a month-initial admission 2023, subsequent ED visit 1/10/2023, and current visit. History is obtained from the patient as well as the record. According to discussion by yesterday's attending:     Patient was doing well in the morning  She went to the bedroom to urinate in the bathroom. After finishing she felt weak/tired and apparently slumped on the bed. When her daughter realized she had been gone for several minutes she went into the bedroom and found her lying stretched across the bed on her abdomen with her face down into the pillows. Patient's history obtained by me today indicates that just prior to her syncopal spells she feels a little weak has no palpitations chest pain shortness of breath headache. She tells me she cares for herself is able to walk up and down steps and has no unusual exertional related symptoms. Her spells occur while she is sitting she denies orthostatic type symptoms. She denies a history of seizures, has had no tongue biting, incontinence, no injury and has been on no new medications. She reports eating reasonably well. In the ED, patient had elevated lactate of 5.7 and was initiated on broad-spectrum antibiotics; with fluids her lactic acid ultimately improved she was admitted for hydration and further examination. There is been no evidence for infection. Evaluation has included the following:  -No evidence for SIRS, normal white count, but anemia trending worse. UA, head CT negative, chest x-ray suggestive of pulmonary congestion. Lactate at 5.7 has now normalized, EKG to my reading shows normal sinus rhythm with a slightly prolonged QTC.   -There is been some very mild rare confusion at night on a few episodes. However that has resolved today and is fairly insignificant. Physical therapy saw the patient yesterday and she did extremely well per my personal discussion with them. Patient is asking to go home. In general her hospitalization has been unremarkable though anemia has worsened somewhat. Subjective:     Reason for provider encounter today:    Follow-up physician visit hospitalization        Chief Complaint :    She has no complaints or concerns. She wants to go home. Is been nothing neurologic. Review of Systems:       General:                     No fever, chills, sweats, generalized weakness, anorexia     Eyes:                 No photophobia, eye pain, or visual changes     ENT:      No rhinorrhea, pharyngitis      Respiratory:    No cough, sputum production, SOB, GIL, wheezing, chest pain      Cardiology:               No chest pain, palpitations, orthopnea, PND, edema, syncope      Gastrointestinal:     No abdominal pain , N/V, diarrhea, dysphagia, constipation, bleeding      Genitourinary:     No frequency, urgency, dysuria, hematuria, incontinence, prostatism      Muskuloskeletal:    No arthralgia, myalgia, back pain     Hematology:     No easy bruising, nose or gum bleeding, lymphadenopathy      Dermatological:    No rash, ulceration, pruritis, color change / jaundice     Neurological:     No headache, dizziness, confusion, focal weakness    paresthesia, speech difficulties, memory loss, gait difficulty     Psychological:    No feelings of anxiety, depression, agitation      PMH/SH:  Reviewed when deemed appropriate.             No change from that contained in H and P except as otherwise stated      Objective:     VITALS:        Patient Vitals for the past 12 hrs:   Temp Pulse Resp BP SpO2   01/20/23 1100 98.6 °F (37 °C) 60 20 (!) 184/84 96 %   01/20/23 0822 98.6 °F (37 °C) 62 18 (!) 145/72 96 %          Intake/Output Summary (Last 24 hours) at 1/20/2023 1332  Last data filed at 1/20/2023 1200  Gross per 24 hour   Intake 480 ml   Output --   Net 480 ml          PHYSICAL EXAM:       General: WDWN  Alert,  Cooperative   NAD      EENT:         EOMI   Anicteric Sclerae   No Pallor  MMM     Resp: No resp distress  CTA bilaterally,  No wheezing or rales. Air movement good    No accessory muscle use     CV:  RRR,  S1-S2 normal without M/R/G     GI:  Soft,  Non distended   Non tender   +Bowel sounds   Generally benign     Neurologic:  Alert and oriented X      Normal speech   Grossly intact   Moving all 4 extremities. Vascular: Distal pulses are good     Musculoskeletal/extremities: No evidence  for DVT   No cyanosis arthritis or joint      abnormality,       Psych:   Good insight. Not anxious nor agitated     Skin:  No rashes. No jaundice      LABS:    Reviewed most current lab test results and cultures  YES  Reviewed most current radiology test results   YES  Reviewed patient's current orders and [facility]    YES    Pertinent labs include:  Recent Labs     01/19/23  0532 01/18/23  1539   WBC 5.7 6.8   HGB 9.8* 10.5*   HCT 31.1* 34.0*    222     Recent Labs     01/19/23  0532 01/18/23  1539    138   K 3.7 4.3    102   CO2 27 25   * 131*   BUN 19 18   CREA 1.54* 1.69*   CA 8.7 9.3   MG 1.8 1.9   ALB  --  3.6   TBILI  --  0.3   ALT  --  17             Procedures: see electronic medical records for all procedures/Xrays and details which were not copied into this note but were reviewed prior to creation of Plan. Assessment / Plan:    Recurrent syncope  Certainly interesting, and history is not particularly helpful other than no apparent orthostasis. -Differential includes orthostasis, seizure, dysrhythmia. Orthostasis in turn perhaps related to antihypertensive medication versus volume depletion. There is a small 2% but definitive well-known association of Aricept with syncope.   No evidence for other usual causes of syncope such as PE. She reports eating well certainly with the MELONIE 1 needs to consider volume depletion and unrecognized orthostasis. There is no obvious persistent neurologic or cognitive deficit. Plan:  Placed telemetry today, get the patient up and walking around. Consideration given toward EEG and Holter monitor on discharge. Could consider transfer for inpatient continuous EEG monitoring but she does not want to do that. -Asked nurses to check orthostatics on her  -Discussed with PT today they saw her yesterday and she did extremely well and they have essentially signed off. Plan:  -DC antibiotics  -We will need to restart blood pressure medications.  -Consider syndrome of orthostatic hypotension, supine hypertension  -Cardiac echo today      MELONIE (acute kidney injury) ([facility] [address] (1/2/2023)  With elevated Lactate now resolved hydration  No S/S infection  -Tying this together nicely could be eating poorly volume depletion resulting in orthostasis, lactic acidosis and syncope. She does not appear to be on diuretics at home. -Aricept can suppress diet  Concerning hx for inconsistency in medication usage  Plan: Follow labs tomorrow restart Cozaar        Essential hypertension (4/5/2016)  This appears not well controlled at this time  -Medications were held including Hytrin and Cozaar due to concerns over orthostasis. Plan: Given elevation of blood pressure would like to resume Cozaar though at a smaller dose. Mixed hyperlipidemia (4/5/2016)  No current tx listed  Mevacor on PTA lists       Gastroesophageal reflux disease without esophagitis (4/5/2016)  Using Prilosec daily PTA  Cont tx Protonix daily AM       Microcytic Anemia  This is worsened somewhat. Not sure if that is related to volume repletion or not. Plan: Iron studies, TSH, B12, follow and they need follow-up as an outpatient for studies.         ___________________________________________________________________    SAFETY:   Code Status:Full  DVT prophylaxis:Lovenox  Bladder catheter:no  Family Contact Info:  Primary Emergency Contact: Theresa Deshpande Home Phone: [phone]  Disposition: TBD, likely home when stable  Admission status:  Inpatient          Care Plan discussed with:                                   Comments  Patient x     Family      RN  Other  Members  Care Team   (specify)  x       Care Manager  x     Consultant                           Multidiciplinary team rounds were held today with , nursing, pharmacist and clinical coordinator. Patient's plan of care was discussed; medications were reviewed and discharge planning was addressed.         ____________________________________________          Signed: Kiran Davies MD  [facility] Hospitalist  [phone]

## 2023-01-20 NOTE — PROGRESS NOTES
Bedside and Verbal shift change report given to Michaelle Da Silva (oncoming nurse) by Luiza Piepr (offgoing nurse). Report included the following information SBAR, Kardex, ED Summary, MAR, and Med Rec Status.

## 2023-01-20 NOTE — PROGRESS NOTES
Dr. Wojciech Gallegos notified about pt's bp of 199/86 HR 61, MD states he will restart bp meds, awaiting order.

## 2023-01-20 NOTE — PROGRESS NOTES
Bedside and Verbal shift change report given to DENNY Luna LPN (oncoming nurse) by Enrrique Swain RN (offgoing nurse). Report included the following information SBAR, Kardex, MAR, and Recent Results.

## 2023-01-21 VITALS
HEIGHT: 67 IN | RESPIRATION RATE: 20 BRPM | SYSTOLIC BLOOD PRESSURE: 155 MMHG | HEART RATE: 66 BPM | DIASTOLIC BLOOD PRESSURE: 68 MMHG | WEIGHT: 219 LBS | TEMPERATURE: 98.4 F | OXYGEN SATURATION: 97 % | BODY MASS INDEX: 34.37 KG/M2

## 2023-01-21 LAB
ANION GAP SERPL CALC-SCNC: 5 MMOL/L (ref 5–15)
BASOPHILS # BLD: 0 K/UL (ref 0–0.1)
BASOPHILS NFR BLD: 0 % (ref 0–1)
BUN SERPL-MCNC: 15 MG/DL (ref 6–20)
BUN/CREAT SERPL: 12 (ref 12–20)
CALCIUM SERPL-MCNC: 8.5 MG/DL (ref 8.5–10.1)
CHLORIDE SERPL-SCNC: 106 MMOL/L (ref 97–108)
CO2 SERPL-SCNC: 29 MMOL/L (ref 21–32)
CREAT SERPL-MCNC: 1.25 MG/DL (ref 0.55–1.02)
DIFFERENTIAL METHOD BLD: ABNORMAL
EOSINOPHIL # BLD: 0.1 K/UL (ref 0–0.4)
EOSINOPHIL NFR BLD: 1 % (ref 0–7)
ERYTHROCYTE [DISTWIDTH] IN BLOOD BY AUTOMATED COUNT: 16.7 % (ref 11.5–14.5)
FOLATE SERPL-MCNC: 9 NG/ML (ref 5–21)
GLUCOSE SERPL-MCNC: 108 MG/DL (ref 65–100)
HCT VFR BLD AUTO: 30.2 % (ref 35–47)
HGB BLD-MCNC: 9.5 G/DL (ref 11.5–16)
IMM GRANULOCYTES # BLD AUTO: 0 K/UL (ref 0–0.04)
IMM GRANULOCYTES NFR BLD AUTO: 0 % (ref 0–0.5)
IRON SATN MFR SERPL: 23 % (ref 20–50)
IRON SERPL-MCNC: 50 UG/DL (ref 50–170)
LYMPHOCYTES # BLD: 1.8 K/UL (ref 0.8–3.5)
LYMPHOCYTES NFR BLD: 37 % (ref 12–49)
MCH RBC QN AUTO: 23.3 PG (ref 26–34)
MCHC RBC AUTO-ENTMCNC: 31.5 G/DL (ref 30–36.5)
MCV RBC AUTO: 74.2 FL (ref 80–99)
MONOCYTES # BLD: 0.4 K/UL (ref 0–1)
MONOCYTES NFR BLD: 9 % (ref 5–13)
NEUTS SEG # BLD: 2.5 K/UL (ref 1.8–8)
NEUTS SEG NFR BLD: 53 % (ref 32–75)
NRBC # BLD: 0 K/UL (ref 0–0.01)
NRBC BLD-RTO: 0 PER 100 WBC
PLATELET # BLD AUTO: 187 K/UL (ref 150–400)
PMV BLD AUTO: 9.5 FL (ref 8.9–12.9)
POTASSIUM SERPL-SCNC: 3.7 MMOL/L (ref 3.5–5.1)
RBC # BLD AUTO: 4.07 M/UL (ref 3.8–5.2)
SODIUM SERPL-SCNC: 140 MMOL/L (ref 136–145)
TIBC SERPL-MCNC: 214 UG/DL (ref 250–450)
VIT B12 SERPL-MCNC: 308 PG/ML (ref 193–986)
WBC # BLD AUTO: 4.9 K/UL (ref 3.6–11)

## 2023-01-21 PROCEDURE — 36415 COLL VENOUS BLD VENIPUNCTURE: CPT

## 2023-01-21 PROCEDURE — 85025 COMPLETE CBC W/AUTO DIFF WBC: CPT

## 2023-01-21 PROCEDURE — 74011250637 HC RX REV CODE- 250/637: Performed by: INTERNAL MEDICINE

## 2023-01-21 PROCEDURE — 74011250636 HC RX REV CODE- 250/636: Performed by: INTERNAL MEDICINE

## 2023-01-21 PROCEDURE — 74011000250 HC RX REV CODE- 250: Performed by: INTERNAL MEDICINE

## 2023-01-21 PROCEDURE — 80048 BASIC METABOLIC PNL TOTAL CA: CPT

## 2023-01-21 PROCEDURE — 83540 ASSAY OF IRON: CPT

## 2023-01-21 RX ORDER — LOSARTAN POTASSIUM 50 MG/1
50 TABLET ORAL DAILY
Qty: 30 TABLET | Refills: 0 | Status: SHIPPED | OUTPATIENT
Start: 2023-01-22 | End: 2023-02-21

## 2023-01-21 RX ORDER — POLYETHYLENE GLYCOL 3350 17 G/17G
17 POWDER, FOR SOLUTION ORAL
Qty: 10 PACKET | Refills: 0 | Status: SHIPPED | OUTPATIENT
Start: 2023-01-21

## 2023-01-21 RX ADMIN — LOSARTAN POTASSIUM 25 MG: 25 TABLET, FILM COATED ORAL at 08:22

## 2023-01-21 RX ADMIN — ENOXAPARIN SODIUM 40 MG: 100 INJECTION SUBCUTANEOUS at 08:22

## 2023-01-21 RX ADMIN — PANTOPRAZOLE SODIUM 40 MG: 40 TABLET, DELAYED RELEASE ORAL at 06:30

## 2023-01-21 RX ADMIN — AMLODIPINE BESYLATE 10 MG: 10 TABLET ORAL at 08:22

## 2023-01-21 RX ADMIN — SODIUM CHLORIDE, PRESERVATIVE FREE 10 ML: 5 INJECTION INTRAVENOUS at 06:29

## 2023-01-21 NOTE — PROGRESS NOTES
Problem: Pressure Injury - Risk of  Goal: *Prevention of pressure injury  Description: Document Juan Scale and appropriate interventions in the flowsheet. Outcome: Progressing Towards Goal  Note: Pressure Injury Interventions:  Sensory Interventions: Maintain/enhance activity level    Moisture Interventions: Minimize layers    Activity Interventions: Increase time out of bed    Mobility Interventions: HOB 30 degrees or less    Nutrition Interventions: Document food/fluid/supplement intake    Friction and Shear Interventions: Minimize layers                Problem: Patient Education: Go to Patient Education Activity  Goal: Patient/Family Education  Outcome: Progressing Towards Goal     Problem: Falls - Risk of  Goal: *Absence of Falls  Description: Document Slick Fall Risk and appropriate interventions in the flowsheet.   Outcome: Progressing Towards Goal  Note: Fall Risk Interventions:            Medication Interventions: Teach patient to arise slowly

## 2023-01-21 NOTE — PROGRESS NOTES
Bedside and Verbal shift change report given to DENNY Luna RN (oncoming nurse) by Mariely Londono RN (offgoing nurse). Report included the following information SBAR, Kardex, MAR, and Recent Results.

## 2023-01-21 NOTE — PROGRESS NOTES
Patient read discharge instructions, and understands what medications to take, what doctor to follow up with, patient understands all instructions and agrees to comply. Patient has all of her belongings and has left with her daughter.

## 2023-01-21 NOTE — PROGRESS NOTES
Transition of Care (MAE) Plan:  Patient is being discharged to home with family. Unfortunately, home health cannot be offered because no home health agency that is referred to in the area takes the patient's insurance. Patient is being discharged to follow up with her PCP. RUR: 15%    MAE Transportation:       How is patient being transported at discharge? When? Is transport scheduled? Follow-up appointment and transportation:Follow-up with PCP where it is suggested a Holter monitor be arranged as an outpatient, follow-up echo, and ensure the patient is on her new antihypertensive regimen     PCP? Ovidio Rojas. MD     Specialist?     Time/Date? Will contact the patient on Monday. 1/23/23 to make sure that the patient called her PCP to make the follow up appointment     Who is transporting to the follow-up appointment? Is transport for follow up appointment scheduled? Family/POV    Communication plan (with patient/family): Who is being called? Patient     What number(s) is to be used? [phone] [facility]   [phone] (Mobile)Della James  [phone] [facility] Phone)     What service provider is calling for [facility] services? PCP office    When are they calling? Probably 24 - 48 hours prior to appointment      Click here to [address] including selection of the Healthcare Decision Maker Relationship (ie \"Primary\")  @[email or web address]     Care Management Interventions  PCP Verified by CM: Yes (Dr. Ovidio Rojas MD)  Last Visit to PCP: 01/17/23  Palliative Care Criteria Met (RRAT>21 & CHF Dx)?: No  Mode of Transport at Discharge:  Other (see comment) (POV)  Transition of Care Consult (CM Consult): Discharge Planning  MyChart Signup: No  Discharge Durable Medical Equipment: No  Physical Therapy Consult: Yes  Occupational Therapy Consult: Yes  Speech Therapy Consult: No  Support Systems: Spouse/Significant Other, Child(nicole)  Confirm Follow Up Transport: Self  [place] Resource Information Provided?: No  Discharge Location  Patient Expects to be Discharged to[de-identified] Home

## 2023-01-21 NOTE — DISCHARGE SUMMARY
[facility]  Hospitalist Discharge Summary    Patient ID:  Wendy Shay  [id number]  81 y.o.  1940    PCP on record: Kimberly Griffith date: 1/18/2023  Discharge date and time: 1/21/2023     DISCHARGE DIAGNOSES:       *PRIMARY DIAGNOSIS  Recurrent syncope         *ADDITIONAL DIAGNOSES  Possible adverse reaction to antihypertensives causing hypotension  Acute kidney injury  Lactic acidosis  Chronic essential hypertension  Dyslipidemia  GERD  Microcytic anemia      CONSULTATIONS:  None      ______________________________________________________________________    HPI  81-year-old female admitted on 1/18/2023 with MELONIE, and recurrent syncope with third such episode in about a month-initial admission 1/2/2023, subsequent ED visit 1/10/2023, and current visit. History is obtained from the patient as well as the record. According to discussion by admitting hospitalist:      Patient was doing well in the morning  She went to the bedroom to urinate in the bathroom. After finishing she felt weak/tired and apparently slumped on the bed. When her daughter realized she had been gone for several minutes she went into the bedroom and found her lying stretched across the bed on her abdomen with her face down into the pillows. Additional history obtained from the patient by myself indicates that just prior to her syncopal spells she feels a little weak has no palpitations chest pain shortness of breath headache. She tells me she cares for herself is able to walk up and down steps and has no unusual exertional related symptoms. Her spells occur while she is sitting she denies orthostatic type symptoms. She denies a history of seizures, has had no tongue biting, incontinence, no injury and has been on no new medications. She reports eating reasonably well.      In the ED, patient had elevated lactate of 5.7 and was initiated on broad-spectrum antibiotics; with fluids her lactic acid ultimately improved she was admitted for hydration and further examination. There is been no evidence for infection. Evaluation has included the following:  -No evidence for SIRS, normal white count, but anemia trending worse. UA, head CT negative, chest x-ray suggestive of pulmonary congestion. Lactate at 5.7 has now normalized, EKG to my reading shows normal sinus rhythm with a slightly prolonged QTC. HOSPITAL COURSE/ OUTCOMES/ ASSESSMENT/PLANS       Recurrent syncope    -Differential includes orthostasis, seizure, dysrhythmia. Orthostasis in turn perhaps related to antihypertensive medication/relative intravascular volume depletion versus absolute volume depletion. There is a small 2% but definitive well-known association of Aricept with syncope. No evidence for other usual causes of syncope such as PE. She reports eating well certainly with the MELONIE-we strongly considered the likelihood of relative intravascular volume depletion and/or related to antihypertensives given the acute kidney injury, lactic acidosis, and the fact that her blood pressures responded very well to a lower dose of blood pressure medications that she is usually on.  -Telemetry showed no dysrhythmias. She did extremely well with physical therapy who signed off  -We had asked about potential transfer for continuous EEG monitoring but she did not want to do that and wanted to go home.  -Cardiac echo was done but not read given the weekend.   Plan:   Going to discharge on a decreased antihypertensive regimen given her good response here to reinitiation at a smaller dose  Follow-up PCP where it is suggested a Holter monitor be arranged as an outpatient, follow-up echo, and ensure the patient is on her new antihypertensive regimen          MELONIE (acute kidney injury) ([facility] [address] (1/2/2023)  With elevated Lactate now resolved hydration  -This supports the contention that her syncope is probably related to relative intravascular volume depletion  No S/S infection  -Tying this together nicely could be eating poorly volume depletion resulting in orthostasis, lactic acidosis and syncope. She does not appear to be on diuretics at home. -Aricept can suppress diet  Concerning hx for inconsistency in medication usage  Plan: Follow labs tomorrow restart Cozaar          Essential hypertension (4/5/2016)  This appears not well controlled at this time  -Medications were held including Hytrin and Cozaar due to concerns over orthostasis. -Given the increase in her blood pressure with this strategy, they were restarted at a lower dose yesterday, and had a pretty good response. Plan: Discharged home on a smaller dose of Cozaar            Mixed hyperlipidmiaa (4/5/2016)  No current tx listed  Mevacor on PTA lists  Follow-up PCP       Gastroesophageal reflux disease without esophagitis (4/5/2016)  Continue use of Protonix consider change to Pepcid given increased risk of C. difficile       Microcytic Anemia  -Microcytic suggestive of iron deficiency and or hemoglobinopathy  -This decreased somewhat here, felt likely related to hydration therapy  -TIBC was 23, TSH not really remarkable at 4.08  -Plan: Outpatient management by PCP. We will add B12 level to today's labs for follow-up, and consider perhaps endoscopy, Hemoccult testing, and/or hemoglobin electrophoresis. She has had microcytosis since at least 2016  ________________________________________________________________      Subjective/Chief Complaint/ Reason For Visit:    She feels well today. Has had no syncope dizziness palpitations. She wishes to go home      Physical Exam:  Visit Vitals  BP (!) 155/68 (BP 1 Location: Right upper arm, BP Patient Position: At rest)   Pulse 66   Temp 98.4 °F (36.9 °C)   Resp 20   Ht 5' 7\" (1.702 m)   Wt 99.3 kg (219 lb)   SpO2 97%   BMI 34.30 kg/m²     Gen:    Not in distress nontoxic.   Vital signs are acceptable sitting at the side of the bed  HEENT: Stable and unremarkable  Chest: Nonlabored respiration, Clear lungs  CVS:   PMI within normal limits, regular rate and rhythm S1-S2 without murmurs rubs gallops  Abd:  Soft, not distended, not tender  EXT: Stable without edema, cyanosis, trauma or significant findings  Neuro:  Alert, nonfocal, weak  Psychiatric: Not suicidal.  No delusions or behavioral disturbance  _______________________________________________________________________  POST DISCHARGE RECOMMENDATIONS         *Suggestions To  Follow Up Providers (include FU labs):  -Suggest placement of a Holter refer from office.  -Suggest outpatient evaluation for anemia  -If syncopal spells continue would suggest referral to neurology  -Confirmed that the patient understands the blood pressure medication changes that we made for her.  -Follow-up results of echo which had not been read at this point  -Follow-up B12 level            *Follow up with:           *PCP : Neeraj Jones         *Diet: Regular          *Activity: Ad Madyson          *Wound Care: none                             *DISCHARGE MEDICATIONS:     Current Discharge Medication List        START taking these medications    Details   polyethylene glycol (MIRALAX) 17 gram packet Take 1 Packet by mouth daily as needed for Constipation for up to 10 doses. Qty: 10 Packet, Refills: 0  Start date: 1/21/2023           CONTINUE these medications which have CHANGED    Details   losartan (COZAAR) 50 mg tablet Take 1 Tablet by mouth daily for 30 days. Qty: 30 Tablet, Refills: 0  Start date: 1/22/2023, End date: 2/21/2023           CONTINUE these medications which have NOT CHANGED    Details   donepeziL (ARICEPT) 5 mg tablet Take 1 Tablet by mouth nightly. Qty: 1 Tablet, Refills: 0      dicyclomine (BENTYL) 20 mg tablet Take 1 Tablet by mouth every six (6) hours as needed for Abdominal Cramps. Qty: 15 Tablet, Refills: 0      mirabegron ER (MYRBETRIQ) 25 mg ER tablet Take 1 Tablet by mouth two (2) times a day. lovastatin (MEVACOR) 20 mg tablet       acetaminophen (TYLENOL ARTHRITIS PAIN PO) Take 1 Tablet by mouth as needed. amLODIPine (NORVASC) 10 mg tablet Take  by mouth daily. omeprazole (PRILOSEC) 40 mg capsule Take 40 mg by mouth daily. ERGOCALCIFEROL, VITAMIN D2, (VITAMIN D2 PO) Take  by mouth.      cyanocobalamin (VITAMIN B12) 500 mcg tablet Take 500 mcg by mouth daily.            STOP taking these medications       terazosin (HYTRIN) 2 mg capsule Comments:   Reason for Stopping:                 ______________________________________________________________________  DISPOSITION:    Home with Family: x   Home with HH/PT/OT/RN:    SNF/LTC:    TIARA:    OTHER:        Condition at Discharge:  Stable  _____________________________________________________________________    Total time in minutes spent coordinating this discharge (includes going over instructions, follow-up, prescriptions, and preparing report for sign off to her PCP) :   Greater than 35 minutes    Signed:  Citlalli Black MD  [facility] Hospitalist  [phone]

## 2023-01-21 NOTE — PROGRESS NOTES
Patient blood pressure 232/99 at 1955, PRN Hydralazine 20mg administered via IV. Patient denies any SOB, chest pain or nausea. Blood pressure rechecked at 2010 161/70.

## 2023-01-21 NOTE — PROGRESS NOTES
Problem: Pressure Injury - Risk of  Goal: *Prevention of pressure injury  Description: Document Juan Scale and appropriate interventions in the flowsheet. Outcome: Progressing Towards Goal  Note: Pressure Injury Interventions: Activity Interventions: Increase time out of bed    Mobility Interventions: HOB 30 degrees or less    Nutrition Interventions: Document food/fluid/supplement intake                     Problem: Patient Education: Go to Patient Education Activity  Goal: Patient/Family Education  Outcome: Progressing Towards Goal     Problem: Falls - Risk of  Goal: *Absence of Falls  Description: Document Ryan Koeniger Fall Risk and appropriate interventions in the flowsheet.   Outcome: Progressing Towards Goal  Note: Fall Risk Interventions:            Medication Interventions: Teach patient to arise slowly                   Problem: Patient Education: Go to Patient Education Activity  Goal: Patient/Family Education  Outcome: Progressing Towards Goal

## 2023-01-21 NOTE — DISCHARGE INSTRUCTIONS
Please note we are continuing your Norvasc at usual dose, we have changed your Cozaar from 100 mg to 50 mg, and have completely stopped the Hytrin. Please be sure you have scheduled a follow-up to see your PCP within the next few days. I am suggesting the refer you for a Holter monitor to look at your heart rhythm.

## 2023-01-22 LAB
BACTERIA SPEC CULT: NORMAL
BACTERIA SPEC CULT: NORMAL
ECHO AO ROOT DIAM: 3.4 CM
ECHO AO ROOT INDEX: 1.64 CM/M2
ECHO AV PEAK GRADIENT: 6 MMHG
ECHO AV PEAK VELOCITY: 1.2 M/S
ECHO EST RA PRESSURE: 10 MMHG
ECHO LA DIAMETER INDEX: 1.79 CM/M2
ECHO LA DIAMETER: 3.7 CM
ECHO LA TO AORTIC ROOT RATIO: 1.09
ECHO LA VOL 2C: 49 ML (ref 22–52)
ECHO LA VOL 4C: 53 ML (ref 22–52)
ECHO LA VOLUME AREA LENGTH: 55 ML
ECHO LA VOLUME INDEX A2C: 24 ML/M2 (ref 16–34)
ECHO LA VOLUME INDEX A4C: 26 ML/M2 (ref 16–34)
ECHO LA VOLUME INDEX AREA LENGTH: 27 ML/M2 (ref 16–34)
ECHO LV E' SEPTAL VELOCITY: 5 CM/S
ECHO LV FRACTIONAL SHORTENING: 30 % (ref 28–44)
ECHO LV INTERNAL DIMENSION DIASTOLE INDEX: 2.22 CM/M2
ECHO LV INTERNAL DIMENSION DIASTOLIC: 4.6 CM (ref 3.9–5.3)
ECHO LV INTERNAL DIMENSION SYSTOLIC INDEX: 1.55 CM/M2
ECHO LV INTERNAL DIMENSION SYSTOLIC: 3.2 CM
ECHO LV IVSD: 1.4 CM (ref 0.6–0.9)
ECHO LV MASS 2D: 230.2 G (ref 67–162)
ECHO LV MASS INDEX 2D: 111.2 G/M2 (ref 43–95)
ECHO LV POSTERIOR WALL DIASTOLIC: 1.2 CM (ref 0.6–0.9)
ECHO LV RELATIVE WALL THICKNESS RATIO: 0.52
ECHO LVOT AREA: 3.1 CM2
ECHO LVOT DIAM: 2 CM
ECHO MV A VELOCITY: 1.02 M/S
ECHO MV E DECELERATION TIME (DT): 330.6 MS
ECHO MV E VELOCITY: 0.79 M/S
ECHO MV E/A RATIO: 0.77
ECHO MV E/E' SEPTAL: 15.8
ECHO RIGHT VENTRICULAR SYSTOLIC PRESSURE (RVSP): 38 MMHG
ECHO TV REGURGITANT MAX VELOCITY: 2.64 M/S
ECHO TV REGURGITANT PEAK GRADIENT: 28 MMHG
SERVICE CMNT-IMP: NORMAL
SERVICE CMNT-IMP: NORMAL

## 2023-01-22 PROCEDURE — 93306 TTE W/DOPPLER COMPLETE: CPT | Performed by: INTERNAL MEDICINE

## 2023-01-23 NOTE — PROGRESS NOTES
Medicare pt has receive and reviewed  signed 2nd IM letter informing them of their right to appeal the discharge. Signed copy has been placed on pt bedside chart.

## 2023-01-23 NOTE — PROGRESS NOTES
FOLLOW UP D/C TELEPHONE CALL:    1/23/23  0935 AM    Spoke with the patient's daughter, Hood Gant ([phone]) regarding needs and concerns. Discussed the need for holtor monitor, EEG, etc and that it was recommended that the patient's PCP follow up and make arrangements for those tests if needed. Will fax the history and physical as well as the discharge summary to PCP office. Arjun Saldivar wanted to make the follow up appointment herself and she was asked to let me know when that appointment is scheduled for.

## 2023-01-24 LAB
BACTERIA SPEC CULT: NORMAL
BACTERIA SPEC CULT: NORMAL
SERVICE CMNT-IMP: NORMAL
SERVICE CMNT-IMP: NORMAL

## 2023-08-02 ENCOUNTER — APPOINTMENT (OUTPATIENT)
Facility: HOSPITAL | Age: 83
End: 2023-08-02
Payer: MEDICARE

## 2023-08-02 ENCOUNTER — HOSPITAL ENCOUNTER (OUTPATIENT)
Facility: HOSPITAL | Age: 83
Setting detail: OBSERVATION
Discharge: HOME | End: 2023-08-04
Attending: EMERGENCY MEDICINE | Admitting: INTERNAL MEDICINE
Payer: MEDICARE

## 2023-08-02 DIAGNOSIS — N30.00 ACUTE CYSTITIS WITHOUT HEMATURIA: ICD-10-CM

## 2023-08-02 DIAGNOSIS — R55 SYNCOPE AND COLLAPSE: Primary | ICD-10-CM

## 2023-08-02 DIAGNOSIS — D64.9 CHRONIC ANEMIA: ICD-10-CM

## 2023-08-02 PROBLEM — I65.23 BILATERAL CAROTID ARTERY STENOSIS: Status: ACTIVE | Noted: 2023-01-04

## 2023-08-02 PROBLEM — N39.0 UTI (URINARY TRACT INFECTION): Status: ACTIVE | Noted: 2023-08-02

## 2023-08-02 PROBLEM — R41.89 COGNITIVE IMPAIRMENT: Chronic | Status: ACTIVE | Noted: 2023-08-02

## 2023-08-02 PROBLEM — R32 URINE INCONTINENCE: Chronic | Status: ACTIVE | Noted: 2023-08-02

## 2023-08-02 LAB
ALBUMIN SERPL-MCNC: 3.2 G/DL (ref 3.5–5)
ALBUMIN/GLOB SERPL: 0.9 (ref 1.1–2.2)
ALP SERPL-CCNC: 86 U/L (ref 45–117)
ALT SERPL-CCNC: 12 U/L (ref 12–78)
ANION GAP SERPL CALC-SCNC: 10 MMOL/L (ref 5–15)
APPEARANCE UR: CLEAR
AST SERPL-CCNC: 12 U/L (ref 15–37)
BACTERIA URNS QL MICRO: ABNORMAL /HPF
BASOPHILS # BLD: 0 K/UL (ref 0–0.1)
BASOPHILS NFR BLD: 1 % (ref 0–1)
BILIRUB SERPL-MCNC: 0.2 MG/DL (ref 0.2–1)
BILIRUB UR QL: NEGATIVE
BUN SERPL-MCNC: 16 MG/DL (ref 6–20)
BUN/CREAT SERPL: 13 (ref 12–20)
CALCIUM SERPL-MCNC: 9.1 MG/DL (ref 8.5–10.1)
CHLORIDE SERPL-SCNC: 104 MMOL/L (ref 97–108)
CO2 SERPL-SCNC: 28 MMOL/L (ref 21–32)
COLOR UR: ABNORMAL
CREAT SERPL-MCNC: 1.27 MG/DL (ref 0.55–1.02)
DIFFERENTIAL METHOD BLD: ABNORMAL
EOSINOPHIL # BLD: 0 K/UL (ref 0–0.4)
EOSINOPHIL NFR BLD: 1 % (ref 0–7)
EPITH CASTS URNS QL MICRO: ABNORMAL /LPF
ERYTHROCYTE [DISTWIDTH] IN BLOOD BY AUTOMATED COUNT: 17.4 % (ref 11.5–14.5)
GLOBULIN SER CALC-MCNC: 3.4 G/DL (ref 2–4)
GLUCOSE SERPL-MCNC: 165 MG/DL (ref 65–100)
GLUCOSE UR STRIP.AUTO-MCNC: NEGATIVE MG/DL
HCT VFR BLD AUTO: 34.3 % (ref 35–47)
HGB BLD-MCNC: 10 G/DL (ref 11.5–16)
HGB UR QL STRIP: NEGATIVE
IMM GRANULOCYTES # BLD AUTO: 0 K/UL (ref 0–0.04)
IMM GRANULOCYTES NFR BLD AUTO: 0 % (ref 0–0.5)
KETONES UR QL STRIP.AUTO: NEGATIVE MG/DL
LEUKOCYTE ESTERASE UR QL STRIP.AUTO: ABNORMAL
LYMPHOCYTES # BLD: 2 K/UL (ref 0.8–3.5)
LYMPHOCYTES NFR BLD: 39 % (ref 12–49)
MCH RBC QN AUTO: 22.5 PG (ref 26–34)
MCHC RBC AUTO-ENTMCNC: 29.2 G/DL (ref 30–36.5)
MCV RBC AUTO: 77.1 FL (ref 80–99)
MONOCYTES # BLD: 0.2 K/UL (ref 0–1)
MONOCYTES NFR BLD: 4 % (ref 5–13)
NEUTS SEG # BLD: 2.8 K/UL (ref 1.8–8)
NEUTS SEG NFR BLD: 55 % (ref 32–75)
NITRITE UR QL STRIP.AUTO: POSITIVE
NRBC # BLD: 0 K/UL (ref 0–0.01)
NRBC BLD-RTO: 0 PER 100 WBC
PH UR STRIP: 6 (ref 5–8)
PLATELET # BLD AUTO: 176 K/UL (ref 150–400)
PMV BLD AUTO: 9.9 FL (ref 8.9–12.9)
POTASSIUM SERPL-SCNC: 3.8 MMOL/L (ref 3.5–5.1)
PROT SERPL-MCNC: 6.6 G/DL (ref 6.4–8.2)
PROT UR STRIP-MCNC: NEGATIVE MG/DL
RBC # BLD AUTO: 4.45 M/UL (ref 3.8–5.2)
RBC #/AREA URNS HPF: ABNORMAL /HPF (ref 0–5)
SODIUM SERPL-SCNC: 142 MMOL/L (ref 136–145)
SP GR UR REFRACTOMETRY: 1.01 (ref 1–1.03)
TROPONIN I SERPL HS-MCNC: 7 NG/L (ref 0–51)
URINE CULTURE IF INDICATED: ABNORMAL
UROBILINOGEN UR QL STRIP.AUTO: 0.2 EU/DL (ref 0.2–1)
WBC # BLD AUTO: 5.1 K/UL (ref 3.6–11)
WBC URNS QL MICRO: ABNORMAL /HPF (ref 0–4)

## 2023-08-02 PROCEDURE — 80053 COMPREHEN METABOLIC PANEL: CPT

## 2023-08-02 PROCEDURE — 87086 URINE CULTURE/COLONY COUNT: CPT

## 2023-08-02 PROCEDURE — 6360000002 HC RX W HCPCS: Performed by: INTERNAL MEDICINE

## 2023-08-02 PROCEDURE — 96372 THER/PROPH/DIAG INJ SC/IM: CPT

## 2023-08-02 PROCEDURE — 96365 THER/PROPH/DIAG IV INF INIT: CPT

## 2023-08-02 PROCEDURE — 99285 EMERGENCY DEPT VISIT HI MDM: CPT

## 2023-08-02 PROCEDURE — 87077 CULTURE AEROBIC IDENTIFY: CPT

## 2023-08-02 PROCEDURE — 2580000003 HC RX 258: Performed by: EMERGENCY MEDICINE

## 2023-08-02 PROCEDURE — 81001 URINALYSIS AUTO W/SCOPE: CPT

## 2023-08-02 PROCEDURE — 93005 ELECTROCARDIOGRAM TRACING: CPT | Performed by: EMERGENCY MEDICINE

## 2023-08-02 PROCEDURE — 84484 ASSAY OF TROPONIN QUANT: CPT

## 2023-08-02 PROCEDURE — G0378 HOSPITAL OBSERVATION PER HR: HCPCS

## 2023-08-02 PROCEDURE — 70450 CT HEAD/BRAIN W/O DYE: CPT

## 2023-08-02 PROCEDURE — 2580000003 HC RX 258: Performed by: INTERNAL MEDICINE

## 2023-08-02 PROCEDURE — 6360000002 HC RX W HCPCS: Performed by: EMERGENCY MEDICINE

## 2023-08-02 PROCEDURE — 72125 CT NECK SPINE W/O DYE: CPT

## 2023-08-02 PROCEDURE — 87186 SC STD MICRODIL/AGAR DIL: CPT

## 2023-08-02 PROCEDURE — 36415 COLL VENOUS BLD VENIPUNCTURE: CPT

## 2023-08-02 PROCEDURE — 6370000000 HC RX 637 (ALT 250 FOR IP): Performed by: INTERNAL MEDICINE

## 2023-08-02 PROCEDURE — 71045 X-RAY EXAM CHEST 1 VIEW: CPT

## 2023-08-02 PROCEDURE — 85025 COMPLETE CBC W/AUTO DIFF WBC: CPT

## 2023-08-02 RX ORDER — SODIUM CHLORIDE 0.9 % (FLUSH) 0.9 %
5-40 SYRINGE (ML) INJECTION EVERY 12 HOURS SCHEDULED
Status: DISCONTINUED | OUTPATIENT
Start: 2023-08-02 | End: 2023-08-04 | Stop reason: HOSPADM

## 2023-08-02 RX ORDER — SODIUM CHLORIDE 9 MG/ML
INJECTION, SOLUTION INTRAVENOUS CONTINUOUS
Status: DISCONTINUED | OUTPATIENT
Start: 2023-08-02 | End: 2023-08-03

## 2023-08-02 RX ORDER — POLYETHYLENE GLYCOL 3350 17 G/17G
17 POWDER, FOR SOLUTION ORAL DAILY PRN
Status: DISCONTINUED | OUTPATIENT
Start: 2023-08-02 | End: 2023-08-04 | Stop reason: HOSPADM

## 2023-08-02 RX ORDER — ACETAMINOPHEN 650 MG/1
650 SUPPOSITORY RECTAL EVERY 6 HOURS PRN
Status: DISCONTINUED | OUTPATIENT
Start: 2023-08-02 | End: 2023-08-04 | Stop reason: HOSPADM

## 2023-08-02 RX ORDER — LANOLIN ALCOHOL/MO/W.PET/CERES
500 CREAM (GRAM) TOPICAL DAILY
Status: DISCONTINUED | OUTPATIENT
Start: 2023-08-03 | End: 2023-08-04 | Stop reason: HOSPADM

## 2023-08-02 RX ORDER — ACETAMINOPHEN 325 MG/1
650 TABLET ORAL EVERY 6 HOURS PRN
Status: DISCONTINUED | OUTPATIENT
Start: 2023-08-02 | End: 2023-08-04 | Stop reason: HOSPADM

## 2023-08-02 RX ORDER — SODIUM CHLORIDE 9 MG/ML
INJECTION, SOLUTION INTRAVENOUS CONTINUOUS
Status: DISPENSED | OUTPATIENT
Start: 2023-08-02 | End: 2023-08-03

## 2023-08-02 RX ORDER — SODIUM CHLORIDE 0.9 % (FLUSH) 0.9 %
5-40 SYRINGE (ML) INJECTION PRN
Status: DISCONTINUED | OUTPATIENT
Start: 2023-08-02 | End: 2023-08-04 | Stop reason: HOSPADM

## 2023-08-02 RX ORDER — ENOXAPARIN SODIUM 100 MG/ML
40 INJECTION SUBCUTANEOUS DAILY
Status: DISCONTINUED | OUTPATIENT
Start: 2023-08-02 | End: 2023-08-04 | Stop reason: HOSPADM

## 2023-08-02 RX ORDER — NITROFURANTOIN 25; 75 MG/1; MG/1
100 CAPSULE ORAL EVERY 12 HOURS SCHEDULED
Status: DISCONTINUED | OUTPATIENT
Start: 2023-08-02 | End: 2023-08-04 | Stop reason: HOSPADM

## 2023-08-02 RX ORDER — DONEPEZIL HYDROCHLORIDE 5 MG/1
5 TABLET, FILM COATED ORAL NIGHTLY
Status: DISCONTINUED | OUTPATIENT
Start: 2023-08-03 | End: 2023-08-04 | Stop reason: HOSPADM

## 2023-08-02 RX ORDER — BISACODYL 10 MG
10 SUPPOSITORY, RECTAL RECTAL DAILY PRN
Status: DISCONTINUED | OUTPATIENT
Start: 2023-08-02 | End: 2023-08-04 | Stop reason: HOSPADM

## 2023-08-02 RX ORDER — PANTOPRAZOLE SODIUM 40 MG/1
40 TABLET, DELAYED RELEASE ORAL
Status: DISCONTINUED | OUTPATIENT
Start: 2023-08-03 | End: 2023-08-04 | Stop reason: HOSPADM

## 2023-08-02 RX ORDER — SODIUM CHLORIDE 9 MG/ML
INJECTION, SOLUTION INTRAVENOUS PRN
Status: DISCONTINUED | OUTPATIENT
Start: 2023-08-02 | End: 2023-08-04 | Stop reason: HOSPADM

## 2023-08-02 RX ADMIN — SODIUM CHLORIDE, PRESERVATIVE FREE 10 ML: 5 INJECTION INTRAVENOUS at 21:33

## 2023-08-02 RX ADMIN — CEFTRIAXONE SODIUM 1000 MG: 1 INJECTION, POWDER, FOR SOLUTION INTRAMUSCULAR; INTRAVENOUS at 19:21

## 2023-08-02 RX ADMIN — NITROFURANTOIN MONOHYDRATE/MACROCRYSTALLINE 100 MG: 25; 75 CAPSULE ORAL at 21:32

## 2023-08-02 RX ADMIN — ENOXAPARIN SODIUM 40 MG: 100 INJECTION SUBCUTANEOUS at 21:33

## 2023-08-02 RX ADMIN — SODIUM CHLORIDE 1000 ML: 9 INJECTION, SOLUTION INTRAVENOUS at 18:02

## 2023-08-02 RX ADMIN — SODIUM CHLORIDE: 9 INJECTION, SOLUTION INTRAVENOUS at 21:32

## 2023-08-02 ASSESSMENT — ENCOUNTER SYMPTOMS
SHORTNESS OF BREATH: 0
NAUSEA: 0
ABDOMINAL PAIN: 0
SORE THROAT: 0
COUGH: 0
EYE REDNESS: 0
VOMITING: 0
DIARRHEA: 0

## 2023-08-02 ASSESSMENT — PAIN - FUNCTIONAL ASSESSMENT: PAIN_FUNCTIONAL_ASSESSMENT: NONE - DENIES PAIN

## 2023-08-02 ASSESSMENT — LIFESTYLE VARIABLES: HOW OFTEN DO YOU HAVE A DRINK CONTAINING ALCOHOL: NEVER

## 2023-08-02 NOTE — ED TRIAGE NOTES
Patient presents to the ED with a complaint of syncopal episode. Per EMS patient fell out of the chair. Alert to name and place. Has some memory issues. Denies pain.

## 2023-08-02 NOTE — ED NOTES
Orthostatics being performed on pt by other staff RN, urine collected for lab.      Tamiko Beck RN  35/84/54 [phone]

## 2023-08-02 NOTE — ED NOTES
Pt ambulating with RN in [place]re[place] to go to restroom, give specimen. Pt walking with no ataxia, no reported dizziness or difficulty.      Donnell Tillman RN  61/15/23 9639

## 2023-08-02 NOTE — H&P
asymmetry. No aphasia or slurred speech. Symmetrical strength, Sensation grossly intact. Alert and oriented X 4.     Lab Data Reviewed:    Recent Results (from the past 24 hour(s))   EKG 12 Lead    Collection Time: 08/02/23  4:09 PM   Result Value Ref Range    Ventricular Rate 69 BPM    Atrial Rate 69 BPM    P-R Interval 184 ms    QRS Duration 82 ms    Q-T Interval 354 ms    QTc Calculation (Bazett) 379 ms    P Axis 39 degrees    R Axis 20 degrees    T Axis 54 degrees    Diagnosis       Normal sinus rhythm  Nonspecific T wave abnormality  Abnormal ECG  When compared with ECG of 18-JAN-2023 16:28,  Nonspecific T wave abnormality, worse in Lateral leads  QT has shortened     CBC with Auto Differential    Collection Time: 08/02/23  4:14 PM   Result Value Ref Range    WBC 5.1 3.6 - 11.0 K/uL    RBC 4.45 3.80 - 5.20 M/uL    Hemoglobin 10.0 (L) 11.5 - 16.0 g/dL    Hematocrit 34.3 (L) 35.0 - 47.0 %    MCV 77.1 (L) 80.0 - 99.0 FL    MCH 22.5 (L) 26.0 - 34.0 PG    MCHC 29.2 (L) 30.0 - 36.5 g/dL    RDW 17.4 (H) 11.5 - 14.5 %    Platelets 616 652 - 071 K/uL    MPV 9.9 8.9 - 12.9 FL    Nucleated RBCs 0.0 0  WBC    nRBC 0.00 0.00 - 0.01 K/uL    Neutrophils % 55 32 - 75 %    Lymphocytes % 39 12 - 49 %    Monocytes % 4 (L) 5 - 13 %    Eosinophils % 1 0 - 7 %    Basophils % 1 0 - 1 %    Immature Granulocytes 0 0.0 - 0.5 %    Neutrophils Absolute 2.8 1.8 - 8.0 K/UL    Lymphocytes Absolute 2.0 0.8 - 3.5 K/UL    Monocytes Absolute 0.2 0.0 - 1.0 K/UL    Eosinophils Absolute 0.0 0.0 - 0.4 K/UL    Basophils Absolute 0.0 0.0 - 0.1 K/UL    Absolute Immature Granulocyte 0.0 0.00 - 0.04 K/UL    Differential Type AUTOMATED     Comprehensive Metabolic Panel    Collection Time: 08/02/23  4:14 PM   Result Value Ref Range    Sodium 142 136 - 145 mmol/L    Potassium 3.8 3.5 - 5.1 mmol/L    Chloride 104 97 - 108 mmol/L    CO2 28 21 - 32 mmol/L    Anion Gap 10 5 - 15 mmol/L    Glucose 165 (H) 65 - 100 mg/dL    BUN 16 6 - 20 MG/DL Creatinine 1.27 (H) 0.55 - 1.02 MG/DL    Bun/Cre Ratio 13 12 - 20      Est, Glom Filt Rate 42 (L) >60 ml/min/1.73m2    Calcium 9.1 8.5 - 10.1 MG/DL    Total Bilirubin 0.2 0.2 - 1.0 MG/DL    ALT 12 12 - 78 U/L    AST 12 (L) 15 - 37 U/L    Alk Phosphatase 86 45 - 117 U/L    Total Protein 6.6 6.4 - 8.2 g/dL    Albumin 3.2 (L) 3.5 - 5.0 g/dL    Globulin 3.4 2.0 - 4.0 g/dL    Albumin/Globulin Ratio 0.9 (L) 1.1 - 2.2     Troponin    Collection Time: 08/02/23  4:14 PM   Result Value Ref Range    Troponin, High Sensitivity 7 0 - 51 ng/L   Urinalysis with Reflex to Culture    Collection Time: 08/02/23  5:43 PM    Specimen: Urine   Result Value Ref Range    Color, UA YELLOW/STRAW      Appearance CLEAR CLEAR      Specific Gravity, UA 1.010 1.003 - 1.030      pH, Urine 6.0 5.0 - 8.0      Protein, UA Negative NEG mg/dL    Glucose, UA Negative NEG mg/dL    Ketones, Urine Negative NEG mg/dL    Bilirubin Urine Negative NEG      Blood, Urine Negative NEG      Urobilinogen, Urine 0.2 0.2 - 1.0 EU/dL    Nitrite, Urine Positive (A) NEG      Leukocyte Esterase, Urine LARGE (A) NEG      WBC, UA  0 - 4 /hpf    RBC, UA 0-5 0 - 5 /hpf    Epithelial Cells UA MODERATE (A) FEW /lpf    BACTERIA, URINE 1+ (A) NEG /hpf    Urine Culture if Indicated URINE CULTURE ORDERED (A) CNI         EKG: NSR 69 bpm, [address]    Radiology:  CT HEAD WO CONTRAST   Final Result   No acute intracranial abnormalities. CT CERVICAL SPINE WO CONTRAST   Final Result      1. No acute fractures. 2.  There are mild multilevel degenerative changes of the spine.             XR CHEST PORTABLE   Final Result      No acute process on portable chest.   Moderate right shoulder osteoarthritis             Care Plan discussed with:   Patient x    Family Daughter /  in ED    RN x         Consultant      Expected  Disposition:   Home with Family x   HH/PT/OT/RN    SNF/LTC    JEREMY      TOTAL TIME:  65 Minutes      Comments    x Reviewed previous

## 2023-08-02 NOTE — ED NOTES
Pt transported to CT scan with [employer] by [employer] at now.       Chalo Bateman RN  59/54/95 [phone]

## 2023-08-02 NOTE — ED NOTES
Received report/assignment, assuming care. Pt brought by EMS for syncopal event falling out of chair outside, roused with cognitive changes, was unable to answer who present was and month initially. After some time recovered. 2 12 leads were unremarkable. No neck or back pain but placed in c-collar and back board for precautions be EMS before arrival. Report taken from [address] [place] [address].      Tamiko Beck RN  45/32/53 [phone]

## 2023-08-03 ENCOUNTER — APPOINTMENT (OUTPATIENT)
Facility: HOSPITAL | Age: 83
End: 2023-08-03
Payer: MEDICARE

## 2023-08-03 LAB
ANION GAP SERPL CALC-SCNC: 11 MMOL/L (ref 5–15)
BASOPHILS # BLD: 0 K/UL (ref 0–0.1)
BASOPHILS NFR BLD: 1 % (ref 0–1)
BUN SERPL-MCNC: 14 MG/DL (ref 6–20)
BUN/CREAT SERPL: 13 (ref 12–20)
CALCIUM SERPL-MCNC: 8.8 MG/DL (ref 8.5–10.1)
CHLORIDE SERPL-SCNC: 108 MMOL/L (ref 97–108)
CO2 SERPL-SCNC: 26 MMOL/L (ref 21–32)
CREAT SERPL-MCNC: 1.1 MG/DL (ref 0.55–1.02)
DIFFERENTIAL METHOD BLD: ABNORMAL
EOSINOPHIL # BLD: 0.1 K/UL (ref 0–0.4)
EOSINOPHIL NFR BLD: 2 % (ref 0–7)
ERYTHROCYTE [DISTWIDTH] IN BLOOD BY AUTOMATED COUNT: 17.7 % (ref 11.5–14.5)
GLUCOSE SERPL-MCNC: 112 MG/DL (ref 65–100)
HCT VFR BLD AUTO: 32.7 % (ref 35–47)
HGB BLD-MCNC: 9.5 G/DL (ref 11.5–16)
IMM GRANULOCYTES # BLD AUTO: 0 K/UL (ref 0–0.04)
IMM GRANULOCYTES NFR BLD AUTO: 0 % (ref 0–0.5)
IRON SATN MFR SERPL: 20 % (ref 20–50)
IRON SERPL-MCNC: 50 UG/DL (ref 50–170)
LYMPHOCYTES # BLD: 2 K/UL (ref 0.8–3.5)
LYMPHOCYTES NFR BLD: 45 % (ref 12–49)
MCH RBC QN AUTO: 22.6 PG (ref 26–34)
MCHC RBC AUTO-ENTMCNC: 29.1 G/DL (ref 30–36.5)
MCV RBC AUTO: 77.9 FL (ref 80–99)
MONOCYTES # BLD: 0.2 K/UL (ref 0–1)
MONOCYTES NFR BLD: 5 % (ref 5–13)
NEUTS SEG # BLD: 2.1 K/UL (ref 1.8–8)
NEUTS SEG NFR BLD: 47 % (ref 32–75)
NRBC # BLD: 0 K/UL (ref 0–0.01)
NRBC BLD-RTO: 0 PER 100 WBC
PLATELET # BLD AUTO: 181 K/UL (ref 150–400)
PMV BLD AUTO: 9.8 FL (ref 8.9–12.9)
POTASSIUM SERPL-SCNC: 4 MMOL/L (ref 3.5–5.1)
RBC # BLD AUTO: 4.2 M/UL (ref 3.8–5.2)
SODIUM SERPL-SCNC: 145 MMOL/L (ref 136–145)
TIBC SERPL-MCNC: 253 UG/DL (ref 250–450)
TSH SERPL DL<=0.05 MIU/L-ACNC: 3.6 UIU/ML (ref 0.36–3.74)
WBC # BLD AUTO: 4.4 K/UL (ref 3.6–11)

## 2023-08-03 PROCEDURE — 85025 COMPLETE CBC W/AUTO DIFF WBC: CPT

## 2023-08-03 PROCEDURE — 6370000000 HC RX 637 (ALT 250 FOR IP): Performed by: INTERNAL MEDICINE

## 2023-08-03 PROCEDURE — 96375 TX/PRO/DX INJ NEW DRUG ADDON: CPT

## 2023-08-03 PROCEDURE — G0378 HOSPITAL OBSERVATION PER HR: HCPCS

## 2023-08-03 PROCEDURE — 97165 OT EVAL LOW COMPLEX 30 MIN: CPT

## 2023-08-03 PROCEDURE — 6360000002 HC RX W HCPCS: Performed by: INTERNAL MEDICINE

## 2023-08-03 PROCEDURE — 84443 ASSAY THYROID STIM HORMONE: CPT

## 2023-08-03 PROCEDURE — 96372 THER/PROPH/DIAG INJ SC/IM: CPT

## 2023-08-03 PROCEDURE — 80048 BASIC METABOLIC PNL TOTAL CA: CPT

## 2023-08-03 PROCEDURE — 36415 COLL VENOUS BLD VENIPUNCTURE: CPT

## 2023-08-03 PROCEDURE — 83540 ASSAY OF IRON: CPT

## 2023-08-03 PROCEDURE — 6360000004 HC RX CONTRAST MEDICATION: Performed by: INTERNAL MEDICINE

## 2023-08-03 PROCEDURE — 94760 N-INVAS EAR/PLS OXIMETRY 1: CPT

## 2023-08-03 PROCEDURE — 70498 CT ANGIOGRAPHY NECK: CPT

## 2023-08-03 PROCEDURE — 83550 IRON BINDING TEST: CPT

## 2023-08-03 PROCEDURE — 2580000003 HC RX 258: Performed by: INTERNAL MEDICINE

## 2023-08-03 RX ORDER — LOSARTAN POTASSIUM 25 MG/1
25 TABLET ORAL DAILY
Status: DISCONTINUED | OUTPATIENT
Start: 2023-08-03 | End: 2023-08-04 | Stop reason: HOSPADM

## 2023-08-03 RX ORDER — HYDRALAZINE HYDROCHLORIDE 20 MG/ML
10 INJECTION INTRAMUSCULAR; INTRAVENOUS EVERY 6 HOURS PRN
Status: DISCONTINUED | OUTPATIENT
Start: 2023-08-03 | End: 2023-08-04 | Stop reason: HOSPADM

## 2023-08-03 RX ADMIN — ACETAMINOPHEN 650 MG: 325 TABLET ORAL at 08:13

## 2023-08-03 RX ADMIN — LOSARTAN POTASSIUM 25 MG: 25 TABLET, FILM COATED ORAL at 18:14

## 2023-08-03 RX ADMIN — HYDRALAZINE HYDROCHLORIDE 10 MG: 20 INJECTION INTRAMUSCULAR; INTRAVENOUS at 21:23

## 2023-08-03 RX ADMIN — PANTOPRAZOLE SODIUM 40 MG: 40 TABLET, DELAYED RELEASE ORAL at 06:21

## 2023-08-03 RX ADMIN — NITROFURANTOIN MONOHYDRATE/MACROCRYSTALLINE 100 MG: 25; 75 CAPSULE ORAL at 08:14

## 2023-08-03 RX ADMIN — ENOXAPARIN SODIUM 40 MG: 100 INJECTION SUBCUTANEOUS at 08:18

## 2023-08-03 RX ADMIN — SODIUM CHLORIDE, PRESERVATIVE FREE 10 ML: 5 INJECTION INTRAVENOUS at 08:22

## 2023-08-03 RX ADMIN — DONEPEZIL HYDROCHLORIDE 5 MG: 5 TABLET, FILM COATED ORAL at 20:19

## 2023-08-03 RX ADMIN — SODIUM CHLORIDE, PRESERVATIVE FREE 10 ML: 5 INJECTION INTRAVENOUS at 20:19

## 2023-08-03 RX ADMIN — NITROFURANTOIN MONOHYDRATE/MACROCRYSTALLINE 100 MG: 25; 75 CAPSULE ORAL at 20:19

## 2023-08-03 RX ADMIN — CYANOCOBALAMIN TAB 1000 MCG 500 MCG: 1000 TAB at 08:16

## 2023-08-03 RX ADMIN — IOPAMIDOL 100 ML: 755 INJECTION, SOLUTION INTRAVENOUS at 14:34

## 2023-08-03 ASSESSMENT — PAIN DESCRIPTION - ORIENTATION: ORIENTATION: ANTERIOR

## 2023-08-03 ASSESSMENT — PAIN DESCRIPTION - DESCRIPTORS: DESCRIPTORS: ACHING

## 2023-08-03 ASSESSMENT — PAIN DESCRIPTION - LOCATION: LOCATION: HEAD

## 2023-08-03 ASSESSMENT — PAIN SCALES - GENERAL
PAINLEVEL_OUTOF10: 3
PAINLEVEL_OUTOF10: 3

## 2023-08-03 NOTE — PROGRESS NOTES
[facility]  Hospitalist Progress Note    NAME: Sinai Earl   :  1940   MRN:  [id number]     Total duration of encounter: 1 day      Interim Hospital Summary: 81 y.o. female who presented on 2023 with Syncope. She has a past medical history of Diverticula of colon, GERD (gastroesophageal reflux disease), Hypertension, and Joint pain. [place] Pt presented to the ED via EMS following a witnessed abrupt syncopal / collapse episode at home. CT neg. Bradycardia on monitor 50s / 60s. Symptoms cleared. Admitted for monitoring. 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 Subjective:     Chief Complaint / Reason for Physician Visit  \"No c/o\".   Discussed with RN   Baseline  Up in room w/o symptoms  No focal weakness appreciated  HR down to 50 - 55 during night on Tele    No  c/o    Review of Systems:  Symptom Y/N Comments  Symptom Y/N Comments   Fever/Chills n   Chest Pain n    Poor Appetite n   Edema n    Cough n   Abdominal Pain n    Sputum n   Joint Pain n    SOB/COREAS n   Pruritis/Rash     Nausea/vomit n   Tolerating PT/OT y    Diarrhea n   Tolerating Diet y    Constipation n   Other         Current Facility-Administered Medications:     vitamin B-12 (CYANOCOBALAMIN) tablet 500 mcg, 500 mcg, Oral, Daily, Luci Gonzalez MD, 500 mcg at 23 0816    donepezil (ARICEPT) tablet 5 mg, 5 mg, Oral, Nightly, Luci Gonzalez MD    pantoprazole (PROTONIX) tablet 40 mg, 40 mg, Oral, QAM AC, Luci Gonzalez MD, 40 mg at 23 7970    polyethylene glycol (GLYCOLAX) packet 17 g, 17 g, Oral, Daily PRN, Luci Gonzalez MD    sodium chloride flush 0.9 % injection 5-40 mL, 5-40 mL, IntraVENous, 2 times per day, Luci Gonzalez MD, 10 mL at 23 0379    sodium chloride flush 0.9 % injection 5-40 mL, 5-40 mL, IntraVENous, PRN, Luci Gonzalez MD    0.9 % sodium chloride infusion, , IntraVENous, PRN, Luci Gonzalez MD    enoxaparin (LOVENOX) injection 40 mg, 40 mg, change compared to H&P    Care Plan discussed with:                                   Comments  Patient x     Family  x   Selmaer in room   RN x     Care Manager  x     Consultant                          x Multidiciplinary team rounds were held today with , nursing, pharmacist and clinical coordinator. Patient's plan of care was discussed; medications were reviewed and discharge planning was addressed.         ____________________________________________    Total NON Critical Care TIME:  45   Minutes        Comments   >50% of visit spent in counseling and coordination of care   x      Signed: Mickie Alexandra MD  [facility] Hospitalist  [phone]

## 2023-08-03 NOTE — ED NOTES
Admission SBAR Note  Situation/Background: pt slid from chair onto ground, found by . Brought in by EMS for syncopal event. No injury. Pt also has a UTI. Pt received rocephin in ED, maintenance fluids. NSR on cardiac monitoring with no ectopy. Pt ambulatory without any dizziness or difficulty in the ED, not orthostatic. Pt denies pain, no complaints. Patient is being transferred to [address] [place] [address], Room# Bed 118    Patient's Chief Complaint was Syncope and is admitted for Syncope. CODE STATUS: Full  CSSRS: 0 - No Risk    ISOLATION/PRECAUTIONS: No  ISOLATION TYPE: n/a    Is this a behavioral health patient? No  Has wanding been completed No  Are belongings secure? No    Called outstanding consults: Yes    STAT labs collected: Yes    Repeat Lactic Acid DUE? No  TIME DUE: n/a    All STAT orders are complete: Yes    The following personal items will be sent with the patient during transfer to the floor: All valuables: clothing with patient      ASSESSMENT:    NEURO:   NIH SCORE: 0,1-4,5-15,15-20,21-42: 0   TRISTIAN SWALLOW SCREEN COMPLETE: No  ORIENTATION LEVEL: ORIENTATION LEVEL: Person, Place, Time, and Situation  Cognition:  appropriate decision making, appropriate for age attention/concentration, appropriate safety awareness, and following commands  follows multi-step complex commands/direction  Speech: shows no evidence of impairment    Is patient impulsive? No  Is patient oriented? Yes  Do they follow commands? Yes  Is the patient ambulatory? Yes    FALL RISK? No  Interventions: Implemented/recommended use of non-skid footwear    RESPIRATORY:   Is patient on oxygen? No  Oxygen therapy: room air  O2 rate: n/a    CARDIAC:   Is cardiac monitoring ordered? Yes    Last Rhythm: Rhythm including paccardio: Normal Sinus Rhythm 60  Patient to transfer with tele box on?  Yes  Infusions: Meds; iv fluids: normal saline  LINE ACCESS: 20G

## 2023-08-03 NOTE — CARE COORDINATION
Care Management Initial Assessment       RUR: N/A Observation  Readmission? No  1st IM letter given? No N/A  1st  letter given: No N/A       08/03/23 1247   Service Assessment   Patient Orientation Alert and Oriented   Cognition Alert   History Provided By Patient   Primary Caregiver Self   Support Systems Spouse/Significant Other;Children;Family Members   PCP Verified by CM Yes  Frances Chowdhury MD)   Last Visit to PCP Within last 3 months   Prior Functional Level Independent in ADLs/IADLs   Current Functional Level Independent in ADLs/IADLs   Can patient return to prior living arrangement Yes   Family able to assist with home care needs: Yes   Would you like for me to discuss the discharge plan with any other family members/significant others, and if so, who? Yes  Ford Lew, daughter, [phone])   Financial Resources Medicare  (Plan is to apply for [employer])   [employer] None   Social/Functional History   Lives With Spouse   Type of [address] None   Active  Yes   Discharge Planning   Type of Residence House   Current Services Prior To Admission None   Patient expects to be discharged to: [facility] (ACP) Conversation    Date of Conversation: 08/-3/23    Conducted with: Patient with Decision Making Capacity    Healthcare Decision Maker:  No healthcare decision makers have been documented.   Click here to complete [address] including selection of the Healthcare Decision Maker Relationship (ie \"Primary\")     Content/Action Overview:  Has NO ACP documents/care preferences - information provided, considering goals and options  Reviewed DNR/DNI and patient elects Full Code (Attempt Resuscitation)      Length of Voluntary ACP Conversation in minutes:  <16 minutes (Non-Billable)    Ashley Overton MSW        Mrs. Mabel Lowery was admitted under Observation with Syncope and Collapse. She lives with her spouse Thiago June outside of [place]. Spouse is getting skilled home health services at this time. Mrs. Elvie Morton has Medicare only. I am going to assist her with the [facility] application and advised her of the verifications we will need for this application. She has my contact information as noted on the CM introductory letter given. Ms. Elvie Morton anticipates no needs at discharge. She is independent: able to manage care needs, able to drive and does not use DME.

## 2023-08-03 NOTE — CARE COORDINATION
Care Management Initial Assessment       RUR: N/A Observation  Readmission? No  1st IM letter given? No N/A  1st  letter given: No N/A       08/03/23 1247   Service Assessment   Patient Orientation Alert and Oriented   Cognition Alert   History Provided By Patient   Primary Caregiver Self   Support Systems Spouse/Significant Other;Children;Family Members   PCP Verified by CM Yes  Brianna Norman MD)   Last Visit to PCP Within last 3 months   Prior Functional Level Independent in ADLs/IADLs   Current Functional Level Independent in ADLs/IADLs   Can patient return to prior living arrangement Yes   Family able to assist with home care needs: Yes   Would you like for me to discuss the discharge plan with any other family members/significant others, and if so, who? Yes  Chauncey Wright, daughter, [phone])   Financial Resources Medicare  (Plan is to apply for [employer])   [employer] None   Social/Functional History   Lives With Spouse   Type of [address] None   Active  Yes   Discharge Planning   Type of Residence House   Current Services Prior To Admission None   Patient expects to be discharged to: [facility] (ACP) Conversation    Date of Conversation: 08/-3/23    Conducted with: Patient with Decision Making Capacity    Healthcare Decision Maker:  No healthcare decision makers have been documented.   Click here to complete [address] including selection of the Healthcare Decision Maker Relationship (ie \"Primary\")     Content/Action Overview:  Has NO ACP documents/care preferences - information provided, considering goals and options  Reviewed DNR/DNI and patient elects Full Code (Attempt Resuscitation)      Length of Voluntary ACP Conversation in minutes:  <16 minutes (Non-Billable)    NICOLAS Licona        Mrs. Arianne Rodriguez was admitted under Observation with Syncope and

## 2023-08-03 NOTE — PLAN OF CARE
Problem: Safety - Adult  Goal: Free from fall injury  Outcome: Progressing  Flowsheets (Taken 8/3/2023 0721)  Free From Fall Injury: Instruct family/caregiver on patient safety     Problem: Pain  Goal: Verbalizes/displays adequate comfort level or baseline comfort level  Outcome: Progressing     Problem: Occupational Therapy - Adult  Goal: By Discharge: Performs self-care activities at highest level of function for planned discharge setting. See evaluation for individualized goals.   8/3/2023 1513 by Mandi Carnes OT  Outcome: Adequate for Discharge

## 2023-08-04 VITALS
HEART RATE: 86 BPM | SYSTOLIC BLOOD PRESSURE: 222 MMHG | BODY MASS INDEX: 32.96 KG/M2 | RESPIRATION RATE: 18 BRPM | OXYGEN SATURATION: 100 % | HEIGHT: 67 IN | WEIGHT: 210 LBS | DIASTOLIC BLOOD PRESSURE: 93 MMHG | TEMPERATURE: 98.8 F

## 2023-08-04 PROCEDURE — 2580000003 HC RX 258: Performed by: INTERNAL MEDICINE

## 2023-08-04 PROCEDURE — 6370000000 HC RX 637 (ALT 250 FOR IP): Performed by: INTERNAL MEDICINE

## 2023-08-04 PROCEDURE — 6360000002 HC RX W HCPCS: Performed by: INTERNAL MEDICINE

## 2023-08-04 PROCEDURE — 97161 PT EVAL LOW COMPLEX 20 MIN: CPT

## 2023-08-04 PROCEDURE — 96376 TX/PRO/DX INJ SAME DRUG ADON: CPT

## 2023-08-04 PROCEDURE — 97110 THERAPEUTIC EXERCISES: CPT

## 2023-08-04 PROCEDURE — 96372 THER/PROPH/DIAG INJ SC/IM: CPT

## 2023-08-04 PROCEDURE — 94760 N-INVAS EAR/PLS OXIMETRY 1: CPT

## 2023-08-04 PROCEDURE — G0378 HOSPITAL OBSERVATION PER HR: HCPCS

## 2023-08-04 RX ORDER — NITROFURANTOIN 25; 75 MG/1; MG/1
100 CAPSULE ORAL EVERY 12 HOURS SCHEDULED
Qty: 6 CAPSULE | Refills: 0 | Status: SHIPPED | OUTPATIENT
Start: 2023-08-04 | End: 2023-08-07

## 2023-08-04 RX ORDER — LOSARTAN POTASSIUM 50 MG/1
50 TABLET ORAL DAILY
Qty: 1 TABLET | Refills: 0 | Status: SHIPPED
Start: 2023-08-05

## 2023-08-04 RX ADMIN — PANTOPRAZOLE SODIUM 40 MG: 40 TABLET, DELAYED RELEASE ORAL at 06:12

## 2023-08-04 RX ADMIN — LOSARTAN POTASSIUM 25 MG: 25 TABLET, FILM COATED ORAL at 08:23

## 2023-08-04 RX ADMIN — ENOXAPARIN SODIUM 40 MG: 100 INJECTION SUBCUTANEOUS at 08:23

## 2023-08-04 RX ADMIN — ACETAMINOPHEN 650 MG: 325 TABLET ORAL at 11:49

## 2023-08-04 RX ADMIN — HYDRALAZINE HYDROCHLORIDE 10 MG: 20 INJECTION INTRAMUSCULAR; INTRAVENOUS at 09:44

## 2023-08-04 RX ADMIN — SODIUM CHLORIDE, PRESERVATIVE FREE 10 ML: 5 INJECTION INTRAVENOUS at 08:24

## 2023-08-04 RX ADMIN — CYANOCOBALAMIN TAB 1000 MCG 500 MCG: 1000 TAB at 08:23

## 2023-08-04 RX ADMIN — NITROFURANTOIN MONOHYDRATE/MACROCRYSTALLINE 100 MG: 25; 75 CAPSULE ORAL at 08:23

## 2023-08-04 ASSESSMENT — PAIN SCALES - GENERAL: PAINLEVEL_OUTOF10: 4

## 2023-08-04 ASSESSMENT — PAIN DESCRIPTION - LOCATION: LOCATION: HEAD

## 2023-08-04 NOTE — PROGRESS NOTES
Patients BP has been running hypertensive since admission but BP has steadily been increasing. Patient care tech Max Garcia) took a set of vitals and BP was 215/90. I retook a manual BP and it was 190/82. Patients HR has been stable. Dr. Jose Martin Galo (Hospitalist) was notified and a new order was placed for hydralazine 10 mg IV q6h PRN for SBP greater than 180. Patient is asymptomatic and will continue to monitor.

## 2023-08-04 NOTE — PLAN OF CARE
Problem: Safety - Adult  Goal: Free from fall injury  8/4/2023 0848 by Lc Cano RN  Outcome: Progressing  8/4/2023 0311 by Caitlin Chaney LPN  Outcome: Progressing     Problem: Pain  Goal: Verbalizes/displays adequate comfort level or baseline comfort level  8/4/2023 0848 by Lc Cano RN  Outcome: Progressing  8/4/2023 0311 by Caitlin Chaney LPN  Outcome: Progressing

## 2023-08-04 NOTE — PLAN OF CARE
Problem: Safety - Adult  Goal: Free from fall injury  8/4/2023 0311 by Gabriela Lou LPN  Outcome: Progressing  8/3/2023 1719 by Gilberto Mojica RN  Outcome: Progressing  Flowsheets (Taken 8/3/2023 0721)  Free From Fall Injury: Instruct family/caregiver on patient safety     Problem: Pain  Goal: Verbalizes/displays adequate comfort level or baseline comfort level  8/4/2023 0311 by Gabriela Lou LPN  Outcome: Progressing  8/3/2023 1719 by Gilberto Mojica RN  Outcome: Progressing

## 2023-08-04 NOTE — PROGRESS NOTES
Discharge Summary    Lupe Phillips  :  1940  MRN:  [id number]    ADMIT DATE:  2023  DISCHARGE DATE:  2023      Discharge instruction reviewed with Patient. Discussed f/u appts with patient and that she will need to return to hospital Aug 8th for holter monitor as mentioned in AVS. Pt expressed understanding and denied any questions.     Home med's returned n/a    Personal belongings returned Yes    Patient Wheeled to front entrance via wheelchair with Nurse        SIGNED:    Lc Cano RN

## 2023-08-04 NOTE — DISCHARGE INSTRUCTIONS
HOSPITALIST RECOMMENDATIONS    Continue usual medications  Complete 3 more days of Macrobid (antibiotic)  Return for HOLTER on TUE, and keep a diary timing for any unusual symptoms that may correlate to a change in the heart rate  You maybe will need a pacemaker  Avoid climbing / driving or other activities that could lead to injury if further spells occur  Follow with PCP in 1 week  ROXY Jordan, [address]

## 2023-08-04 NOTE — CARE COORDINATION
08/04/23 [address] Discharge   Transition of Care Consult (CM Consult) N/A   Services At/After Discharge None   Mode of Transport at Discharge Other (see comment)  (POV family)   Confirm Follow Up Transport Family         Ms. Kasandra Goodell is being discharged home today. She stated she did NOT need home health. She does need a cardiac event monitor. Unfortunatly, per radiology nurse they do not have any cardiac event monitors in stock but should on Tuesday. Plan is for patient to get her cardiac even monitor at Tuesday around 9am. Provided patient with order and will also give order to . Patient is aware and agrees with dc plan. Told patient to contact CM for any questions or concerns post discharge. Transition of Care Plan:    RUR: n/a in obs   Prior Level of Functioning: Independent   Disposition: Home. Refused HH   If SNF or IPR: Date FOC offered: n/a    Date FOC received: n/a   Accepting facility: n/a   Date authorization started with reference number: n/a   Date authorization received and expires: n/a   Follow up appointments:   [facility] (for Cardiac Event Monitor) 8/8 at [address] [place] [address] [place] MD 8/16 at 2:30pm   DME needed: None   Transportation at discharge: POV  IM/IMM Medicare/ letter given: n/a in obs   Is patient a  and connected with [facility]? N/a    If yes, was [employer] transfer form completed and [facility] notified? N/a   Caregiver Contact: n.a   Discharge Caregiver contacted prior to discharge? N/a   Care Conference needed?  No   Barriers to discharge: None

## 2023-08-05 LAB
BACTERIA SPEC CULT: ABNORMAL
CC UR VC: ABNORMAL
EKG ATRIAL RATE: 69 BPM
EKG DIAGNOSIS: NORMAL
EKG P AXIS: 39 DEGREES
EKG P-R INTERVAL: 184 MS
EKG Q-T INTERVAL: 354 MS
EKG QRS DURATION: 82 MS
EKG QTC CALCULATION (BAZETT): 379 MS
EKG R AXIS: 20 DEGREES
EKG T AXIS: 54 DEGREES
EKG VENTRICULAR RATE: 69 BPM
SERVICE CMNT-IMP: ABNORMAL

## 2023-08-08 ENCOUNTER — HOSPITAL ENCOUNTER (OUTPATIENT)
Facility: HOSPITAL | Age: 83
Discharge: HOME | End: 2023-08-10
Attending: INTERNAL MEDICINE

## 2023-09-01 PROBLEM — N39.0 UTI (URINARY TRACT INFECTION): Status: RESOLVED | Noted: 2023-08-02 | Resolved: 2023-09-01

## 2023-09-16 ENCOUNTER — APPOINTMENT (OUTPATIENT)
Facility: HOSPITAL | Age: 83
End: 2023-09-16
Payer: MEDICARE

## 2023-09-16 ENCOUNTER — HOSPITAL ENCOUNTER (EMERGENCY)
Facility: HOSPITAL | Age: 83
Discharge: HOME | End: 2023-09-16
Attending: EMERGENCY MEDICINE
Payer: MEDICARE

## 2023-09-16 VITALS
RESPIRATION RATE: 19 BRPM | WEIGHT: 206 LBS | DIASTOLIC BLOOD PRESSURE: 69 MMHG | BODY MASS INDEX: 33.11 KG/M2 | HEART RATE: 60 BPM | HEIGHT: 66 IN | OXYGEN SATURATION: 99 % | TEMPERATURE: 98.7 F | SYSTOLIC BLOOD PRESSURE: 186 MMHG

## 2023-09-16 DIAGNOSIS — I16.0 HYPERTENSIVE URGENCY: Primary | ICD-10-CM

## 2023-09-16 DIAGNOSIS — U07.1 COVID-19 VIRUS INFECTION: ICD-10-CM

## 2023-09-16 LAB
FLUAV RNA SPEC QL NAA+PROBE: NOT DETECTED
FLUBV RNA SPEC QL NAA+PROBE: NOT DETECTED
SARS-COV-2 RNA RESP QL NAA+PROBE: DETECTED

## 2023-09-16 PROCEDURE — 71045 X-RAY EXAM CHEST 1 VIEW: CPT

## 2023-09-16 PROCEDURE — 87636 SARSCOV2 & INF A&B AMP PRB: CPT

## 2023-09-16 PROCEDURE — 99285 EMERGENCY DEPT VISIT HI MDM: CPT

## 2023-09-16 PROCEDURE — 6370000000 HC RX 637 (ALT 250 FOR IP): Performed by: EMERGENCY MEDICINE

## 2023-09-16 PROCEDURE — 94640 AIRWAY INHALATION TREATMENT: CPT

## 2023-09-16 RX ORDER — IPRATROPIUM BROMIDE AND ALBUTEROL SULFATE 2.5; .5 MG/3ML; MG/3ML
1 SOLUTION RESPIRATORY (INHALATION)
Status: COMPLETED | OUTPATIENT
Start: 2023-09-16 | End: 2023-09-16

## 2023-09-16 RX ORDER — CLONIDINE HYDROCHLORIDE 0.1 MG/1
0.2 TABLET ORAL
Status: COMPLETED | OUTPATIENT
Start: 2023-09-16 | End: 2023-09-16

## 2023-09-16 RX ORDER — IPRATROPIUM BROMIDE AND ALBUTEROL SULFATE 2.5; .5 MG/3ML; MG/3ML
SOLUTION RESPIRATORY (INHALATION)
Status: DISCONTINUED
Start: 2023-09-16 | End: 2023-09-16 | Stop reason: HOSPADM

## 2023-09-16 RX ORDER — ALBUTEROL SULFATE 90 UG/1
2 AEROSOL, METERED RESPIRATORY (INHALATION) 4 TIMES DAILY PRN
Qty: 54 G | Refills: 1 | Status: SHIPPED | OUTPATIENT
Start: 2023-09-16

## 2023-09-16 RX ADMIN — CLONIDINE HYDROCHLORIDE 0.2 MG: 0.1 TABLET ORAL at 19:35

## 2023-09-16 RX ADMIN — IPRATROPIUM BROMIDE AND ALBUTEROL SULFATE 1 DOSE: 2.5; .5 SOLUTION RESPIRATORY (INHALATION) at 18:54

## 2023-09-16 ASSESSMENT — LIFESTYLE VARIABLES: HOW OFTEN DO YOU HAVE A DRINK CONTAINING ALCOHOL: NEVER

## 2023-09-16 ASSESSMENT — PAIN - FUNCTIONAL ASSESSMENT: PAIN_FUNCTIONAL_ASSESSMENT: NONE - DENIES PAIN

## 2023-09-16 NOTE — ED PROVIDER NOTES
EMERGENCY DEPARTMENT HISTORY AND PHYSICAL EXAM      Date: 9/16/2023  Patient Name: Mariama Erickson    History of Presenting Illness     Chief Complaint   Patient presents with    Fatigue       History Provided By: Patient    HPI: Mariama Erickson, 81 y.o. female with past medical history listed below, presents via private vehicle to the ED with cc of David and shortness of breath. Patient reports she was diagnosed with COVID 2 weeks ago. She reports some generalized fatigue and shortness of breath that has not improved since the diagnosis. She denies any chest pain. No nausea vomiting. Fevers have resolved. No abdominal pain. There are no other complaints, changes, or physical findings at this time. PCP: Michelle Reynolds    No current facility-administered medications on file prior to encounter.      Current Outpatient Medications on File Prior to Encounter   Medication Sig Dispense Refill    losartan (COZAAR) 50 MG tablet Take 1 tablet by mouth daily 1 tablet 0    cyanocobalamin 500 MCG tablet Take 500 mcg by mouth daily      donepezil (ARICEPT) 5 MG tablet Take 1 tablet by mouth nightly      lovastatin (MEVACOR) 20 MG tablet ceived the following from [facility]: Outside name: lovastatin (MEVACOR) 20 mg tablet      mirabegron (MYRBETRIQ) 25 MG TB24 Take 1 tablet by mouth 2 times daily      omeprazole (PRILOSEC) 40 MG delayed release capsule Take 40 mg by mouth daily      polyethylene glycol (GLYCOLAX) 17 GM/SCOOP powder Take 17 g by mouth daily as needed         Past History     Past Medical History:  Past Medical History:   Diagnosis Date    Diverticula of colon     GERD (gastroesophageal reflux disease)     Hypertension     Joint pain        Past Surgical History:  Past Surgical History:   Procedure Laterality Date    CATARACT REMOVAL      OTHER SURGICAL HISTORY      tumor on right side    TUMOR REMOVAL      fatty tumor upper left arm       Family History:  Family History   Problem Psychiatric:         Behavior: Behavior normal.               Diagnostic Study Results     Labs -   No results found for this or any previous visit (from the past 12 hour(s)). Radiologic Studies -   XR CHEST PORTABLE   Final Result   No acute cardiopulmonary abnormalities. [unfilled]  [unfilled]      Medical Decision Making   I am the first provider for this patient. I reviewed the vital signs, available nursing notes, past medical history, past surgical history, family history and social history. Vital Signs-Reviewed the patient's vital signs. Patient Vitals for the past 12 hrs:   BP SpO2   09/16/23 2028 -- 99 %   09/16/23 2018 (!) 186/69 95 %   09/16/23 2001 -- 97 %   09/16/23 2000 (!) 211/75 --   09/16/23 1959 -- 99 %           Records Reviewed: Nursing notes reviewed    Provider Notes (Medical Decision Making):   DDX: 81-year-old female with recent COVID diagnosis with persistent symptoms of cough and shortness of breath. Patient afebrile and nontoxic. BP markedly elevated at 224/102. No headache. No chest pain. Will lower blood pressure with antihypertensives. Will obtain chest x-ray. Patient and family requested repeat COVID testing. ED Course:   Initial assessment performed. The patients presenting problems have been discussed, and they are in agreement with the care plan formulated and outlined with them. I have encouraged them to ask questions as they arise throughout their visit. ED EKG interpretation:  Rhythm: normal sinus rhythm; and regular . Rate (approx.): 60; Axis: normal; MT Interval: normal; QRS interval: normal ; ST/T wave: non-specific changes; This EKG was interpreted by Stephanie Youssef MD,ED Provider. 19:30 Chest x-ray clear. Good room air sats 99%. Patient was symptomatic improvement in breathing with DuoNeb. Will discharge with albuterol inhaler. Repeat COVID testing still positive. BP mildly improved after clonidine. We will continue to monitor.   Care

## 2023-09-17 ASSESSMENT — ENCOUNTER SYMPTOMS
EYE REDNESS: 0
ABDOMINAL PAIN: 0
VOMITING: 0
COUGH: 1
SHORTNESS OF BREATH: 1
DIARRHEA: 0
SORE THROAT: 0
WHEEZING: 1
NAUSEA: 0

## 2023-09-17 NOTE — ED NOTES
Discharge instructions and medications reviewed with Pt. Pt has no questions at this time and is discharged with daughter.       Leopold Flake, RN  09/16/23 8332

## 2023-10-20 ENCOUNTER — HOSPITAL ENCOUNTER (EMERGENCY)
Facility: HOSPITAL | Age: 83
Discharge: HOME | End: 2023-10-20
Attending: FAMILY MEDICINE
Payer: MEDICARE

## 2023-10-20 ENCOUNTER — APPOINTMENT (OUTPATIENT)
Facility: HOSPITAL | Age: 83
End: 2023-10-20
Payer: MEDICARE

## 2023-10-20 VITALS
OXYGEN SATURATION: 99 % | DIASTOLIC BLOOD PRESSURE: 77 MMHG | BODY MASS INDEX: 32.28 KG/M2 | WEIGHT: 200 LBS | SYSTOLIC BLOOD PRESSURE: 217 MMHG | TEMPERATURE: 98.2 F | HEART RATE: 68 BPM | RESPIRATION RATE: 17 BRPM

## 2023-10-20 DIAGNOSIS — R10.84 GENERALIZED ABDOMINAL PAIN: Primary | ICD-10-CM

## 2023-10-20 DIAGNOSIS — I10 ESSENTIAL HYPERTENSION: ICD-10-CM

## 2023-10-20 LAB
ALBUMIN SERPL-MCNC: 3.6 G/DL (ref 3.5–5)
ALBUMIN/GLOB SERPL: 1 (ref 1.1–2.2)
ALP SERPL-CCNC: 110 U/L (ref 45–117)
ALT SERPL-CCNC: 16 U/L (ref 12–78)
ANION GAP SERPL CALC-SCNC: 8 MMOL/L (ref 5–15)
APPEARANCE UR: CLEAR
AST SERPL-CCNC: 26 U/L (ref 15–37)
BACTERIA URNS QL MICRO: ABNORMAL /HPF
BASOPHILS # BLD: 0 K/UL (ref 0–0.1)
BASOPHILS NFR BLD: 1 % (ref 0–1)
BILIRUB SERPL-MCNC: 0.5 MG/DL (ref 0.2–1)
BILIRUB UR QL: NEGATIVE
BUN SERPL-MCNC: 19 MG/DL (ref 6–20)
BUN/CREAT SERPL: 19 (ref 12–20)
CALCIUM SERPL-MCNC: 9.5 MG/DL (ref 8.5–10.1)
CHLORIDE SERPL-SCNC: 102 MMOL/L (ref 97–108)
CO2 SERPL-SCNC: 27 MMOL/L (ref 21–32)
COLOR UR: ABNORMAL
CREAT SERPL-MCNC: 1.01 MG/DL (ref 0.55–1.02)
DIFFERENTIAL METHOD BLD: ABNORMAL
EKG ATRIAL RATE: 55 BPM
EKG ATRIAL RATE: 65 BPM
EKG DIAGNOSIS: NORMAL
EKG DIAGNOSIS: NORMAL
EKG P AXIS: 84 DEGREES
EKG P-R INTERVAL: 210 MS
EKG Q-T INTERVAL: 416 MS
EKG Q-T INTERVAL: 484 MS
EKG QRS DURATION: 70 MS
EKG QRS DURATION: 72 MS
EKG QTC CALCULATION (BAZETT): 449 MS
EKG QTC CALCULATION (BAZETT): 463 MS
EKG R AXIS: 13 DEGREES
EKG R AXIS: 19 DEGREES
EKG T AXIS: 56 DEGREES
EKG T AXIS: 74 DEGREES
EKG VENTRICULAR RATE: 55 BPM
EKG VENTRICULAR RATE: 70 BPM
EOSINOPHIL # BLD: 0.1 K/UL (ref 0–0.4)
EOSINOPHIL NFR BLD: 2 % (ref 0–7)
EPITH CASTS URNS QL MICRO: ABNORMAL /LPF
ERYTHROCYTE [DISTWIDTH] IN BLOOD BY AUTOMATED COUNT: 17.6 % (ref 11.5–14.5)
GLOBULIN SER CALC-MCNC: 3.7 G/DL (ref 2–4)
GLUCOSE SERPL-MCNC: 115 MG/DL (ref 65–100)
GLUCOSE UR STRIP.AUTO-MCNC: NEGATIVE MG/DL
HCT VFR BLD AUTO: 37.8 % (ref 35–47)
HGB BLD-MCNC: 11 G/DL (ref 11.5–16)
HGB UR QL STRIP: NEGATIVE
IMM GRANULOCYTES # BLD AUTO: 0 K/UL (ref 0–0.04)
IMM GRANULOCYTES NFR BLD AUTO: 0 % (ref 0–0.5)
KETONES UR QL STRIP.AUTO: NEGATIVE MG/DL
LACTATE SERPL-SCNC: 1.1 MMOL/L (ref 0.4–2)
LEUKOCYTE ESTERASE UR QL STRIP.AUTO: ABNORMAL
LIPASE SERPL-CCNC: 140 U/L (ref 13–75)
LYMPHOCYTES # BLD: 2.3 K/UL (ref 0.8–3.5)
LYMPHOCYTES NFR BLD: 37 % (ref 12–49)
MCH RBC QN AUTO: 23.3 PG (ref 26–34)
MCHC RBC AUTO-ENTMCNC: 29.1 G/DL (ref 30–36.5)
MCV RBC AUTO: 79.9 FL (ref 80–99)
MONOCYTES # BLD: 0.4 K/UL (ref 0–1)
MONOCYTES NFR BLD: 6 % (ref 5–13)
NEUTS SEG # BLD: 3.3 K/UL (ref 1.8–8)
NEUTS SEG NFR BLD: 54 % (ref 32–75)
NITRITE UR QL STRIP.AUTO: NEGATIVE
NRBC # BLD: 0 K/UL (ref 0–0.01)
NRBC BLD-RTO: 0 PER 100 WBC
PH UR STRIP: 5 (ref 5–8)
PLATELET # BLD AUTO: 244 K/UL (ref 150–400)
PMV BLD AUTO: 10.1 FL (ref 8.9–12.9)
POTASSIUM SERPL-SCNC: 4.3 MMOL/L (ref 3.5–5.1)
PROT SERPL-MCNC: 7.3 G/DL (ref 6.4–8.2)
PROT UR STRIP-MCNC: NEGATIVE MG/DL
RBC # BLD AUTO: 4.73 M/UL (ref 3.8–5.2)
RBC #/AREA URNS HPF: ABNORMAL /HPF (ref 0–5)
SODIUM SERPL-SCNC: 137 MMOL/L (ref 136–145)
SP GR UR REFRACTOMETRY: 1.02 (ref 1–1.03)
TROPONIN I SERPL HS-MCNC: 14 NG/L (ref 0–51)
TROPONIN I SERPL HS-MCNC: 15 NG/L (ref 0–51)
URINE CULTURE IF INDICATED: ABNORMAL
UROBILINOGEN UR QL STRIP.AUTO: 0.2 EU/DL (ref 0.2–1)
WBC # BLD AUTO: 6.2 K/UL (ref 3.6–11)
WBC URNS QL MICRO: ABNORMAL /HPF (ref 0–4)

## 2023-10-20 PROCEDURE — 83690 ASSAY OF LIPASE: CPT

## 2023-10-20 PROCEDURE — 83605 ASSAY OF LACTIC ACID: CPT

## 2023-10-20 PROCEDURE — 80053 COMPREHEN METABOLIC PANEL: CPT

## 2023-10-20 PROCEDURE — 36415 COLL VENOUS BLD VENIPUNCTURE: CPT

## 2023-10-20 PROCEDURE — 74176 CT ABD & PELVIS W/O CONTRAST: CPT

## 2023-10-20 PROCEDURE — 87086 URINE CULTURE/COLONY COUNT: CPT

## 2023-10-20 PROCEDURE — 81001 URINALYSIS AUTO W/SCOPE: CPT

## 2023-10-20 PROCEDURE — 84484 ASSAY OF TROPONIN QUANT: CPT

## 2023-10-20 PROCEDURE — 85025 COMPLETE CBC W/AUTO DIFF WBC: CPT

## 2023-10-20 PROCEDURE — 99284 EMERGENCY DEPT VISIT MOD MDM: CPT

## 2023-10-20 ASSESSMENT — LIFESTYLE VARIABLES
HOW OFTEN DO YOU HAVE A DRINK CONTAINING ALCOHOL: NEVER
HOW MANY STANDARD DRINKS CONTAINING ALCOHOL DO YOU HAVE ON A TYPICAL DAY: PATIENT DOES NOT DRINK

## 2023-10-20 ASSESSMENT — PAIN - FUNCTIONAL ASSESSMENT
PAIN_FUNCTIONAL_ASSESSMENT: NONE - DENIES PAIN
PAIN_FUNCTIONAL_ASSESSMENT: NONE - DENIES PAIN

## 2023-10-20 NOTE — ED NOTES
Pt denies any pain and states she would like to go home.  MD aware     Oswaldo Powell, RN  10/20/23 9736

## 2023-10-20 NOTE — DISCHARGE INSTRUCTIONS
Take blood pressure medication this morning. Follow-up with Dr. Amanda Altman this week and discuss possible causes of your abdominal pain. We discussed the possibility of something like irritable bowel or spastic colon. If you have a cystocele this may be causing some of your discomfort which would have to be evaluated with a pelvic exam.  Return for fever, sweats, chills, uncontrolled discomfort or change in symptoms, uncontrolled nausea or vomiting or new symptoms.

## 2023-10-20 NOTE — ED PROVIDER NOTES
computer software. Please disregards these errors.  Please excuse any errors that have escaped final proofreading.)

## 2023-10-22 LAB
BACTERIA SPEC CULT: ABNORMAL
CC UR VC: ABNORMAL
SERVICE CMNT-IMP: ABNORMAL

## 2023-10-26 LAB
EKG ATRIAL RATE: 65 BPM
EKG DIAGNOSIS: NORMAL
EKG Q-T INTERVAL: 416 MS
EKG QRS DURATION: 72 MS
EKG QTC CALCULATION (BAZETT): 449 MS
EKG R AXIS: 19 DEGREES
EKG T AXIS: 56 DEGREES
EKG VENTRICULAR RATE: 70 BPM

## 2023-12-22 NOTE — PROGRESS NOTES
Called mother to inform her- physical form is ready for  to give to the , unless she wants us to fax it to the facility.     If she has any questions or concerns to give us a call back.       Left direct number    Problem: Pressure Injury - Risk of  Goal: *Prevention of pressure injury  Description: Document Juan Scale and appropriate interventions in the flowsheet. 1/21/2023 1111 by Jeny Steiner RN  Outcome: Resolved/Met  Note: Pressure Injury Interventions:  Sensory Interventions: Maintain/enhance activity level    Moisture Interventions: Minimize layers    Activity Interventions: Increase time out of bed    Mobility Interventions: HOB 30 degrees or less    Nutrition Interventions: Document food/fluid/supplement intake    Friction and Shear Interventions: Minimize layers             1/21/2023 0958 by Jeny Steiner RN  Outcome: Progressing Towards Goal  Note: Pressure Injury Interventions:  Sensory Interventions: Maintain/enhance activity level    Moisture Interventions: Minimize layers    Activity Interventions: Increase time out of bed    Mobility Interventions: HOB 30 degrees or less    Nutrition Interventions: Document food/fluid/supplement intake    Friction and Shear Interventions: Minimize layers                Problem: Patient Education: Go to Patient Education Activity  Goal: Patient/Family Education  1/21/2023 1111 by Jeny Steiner RN  Outcome: Resolved/Met  1/21/2023 0958 by Jeny Steiner RN  Outcome: Progressing Towards Goal     Problem: Falls - Risk of  Goal: *Absence of Falls  Description: Document Slick Fall Risk and appropriate interventions in the flowsheet.   1/21/2023 1111 by Jeny Steiner RN  Outcome: Resolved/Met  Note: Fall Risk Interventions:            Medication Interventions: Teach patient to arise slowly                1/21/2023 0958 by Jeny Steiner RN  Outcome: Progressing Towards Goal  Note: Fall Risk Interventions:            Medication Interventions: Teach patient to arise slowly                   Problem: Patient Education: Go to Patient Education Activity  Goal: Patient/Family Education  Outcome: Resolved/Met     Problem: Patient Education: Go to Patient Education Activity  Goal: Patient/Family Education  Outcome: Resolved/Met

## 2025-01-28 ENCOUNTER — APPOINTMENT (OUTPATIENT)
Facility: HOSPITAL | Age: 85
End: 2025-01-28
Payer: MEDICARE

## 2025-01-28 ENCOUNTER — HOSPITAL ENCOUNTER (EMERGENCY)
Facility: HOSPITAL | Age: 85
Discharge: HOME | End: 2025-01-28
Attending: EMERGENCY MEDICINE
Payer: MEDICARE

## 2025-01-28 VITALS
RESPIRATION RATE: 19 BRPM | HEART RATE: 65 BPM | OXYGEN SATURATION: 98 % | BODY MASS INDEX: 32.96 KG/M2 | HEIGHT: 67 IN | TEMPERATURE: 97.5 F | WEIGHT: 210 LBS | DIASTOLIC BLOOD PRESSURE: 62 MMHG | SYSTOLIC BLOOD PRESSURE: 193 MMHG

## 2025-01-28 DIAGNOSIS — K80.50 BILIARY COLIC: Primary | ICD-10-CM

## 2025-01-28 DIAGNOSIS — I10 ESSENTIAL HYPERTENSION: ICD-10-CM

## 2025-01-28 LAB
ALBUMIN SERPL-MCNC: 3.7 G/DL (ref 3.5–5)
ALBUMIN/GLOB SERPL: 0.9 (ref 1.1–2.2)
ALP SERPL-CCNC: 114 U/L (ref 45–117)
ALT SERPL-CCNC: 18 U/L (ref 12–78)
ANION GAP SERPL CALC-SCNC: 7 MMOL/L (ref 2–12)
AST SERPL-CCNC: 10 U/L (ref 15–37)
BASOPHILS # BLD: 0.03 K/UL (ref 0–0.1)
BASOPHILS NFR BLD: 0.4 % (ref 0–1)
BILIRUB SERPL-MCNC: 0.5 MG/DL (ref 0.2–1)
BUN SERPL-MCNC: 17 MG/DL (ref 6–20)
BUN/CREAT SERPL: 13 (ref 12–20)
CALCIUM SERPL-MCNC: 9.8 MG/DL (ref 8.5–10.1)
CHLORIDE SERPL-SCNC: 102 MMOL/L (ref 97–108)
CO2 SERPL-SCNC: 32 MMOL/L (ref 21–32)
CREAT SERPL-MCNC: 1.3 MG/DL (ref 0.55–1.02)
DIFFERENTIAL METHOD BLD: ABNORMAL
EOSINOPHIL # BLD: 0.04 K/UL (ref 0–0.4)
EOSINOPHIL NFR BLD: 0.6 % (ref 0–0.7)
ERYTHROCYTE [DISTWIDTH] IN BLOOD BY AUTOMATED COUNT: 15.6 % (ref 11.5–14.5)
GLOBULIN SER CALC-MCNC: 3.9 G/DL (ref 2–4)
GLUCOSE SERPL-MCNC: 100 MG/DL (ref 65–100)
HCT VFR BLD AUTO: 38.5 % (ref 35–47)
HGB BLD-MCNC: 11.5 G/DL (ref 11.5–16)
IMM GRANULOCYTES # BLD AUTO: 0.04 K/UL (ref 0–0.04)
IMM GRANULOCYTES NFR BLD AUTO: 0.6 % (ref 0–0.5)
LIPASE SERPL-CCNC: 51 U/L (ref 13–75)
LYMPHOCYTES # BLD: 1.89 K/UL (ref 0.8–3.5)
LYMPHOCYTES NFR BLD: 27.6 % (ref 12–49)
MCH RBC QN AUTO: 24.6 PG (ref 26–34)
MCHC RBC AUTO-ENTMCNC: 29.9 G/DL (ref 30–36.5)
MCV RBC AUTO: 82.3 FL (ref 80–99)
MONOCYTES # BLD: 0.32 K/UL (ref 0–1)
MONOCYTES NFR BLD: 4.7 % (ref 5–13)
NEUTS SEG # BLD: 4.53 K/UL (ref 1.8–8)
NEUTS SEG NFR BLD: 66.1 % (ref 32–75)
NRBC # BLD: 0 K/UL (ref 0–0.01)
NRBC BLD-RTO: 0 PER 100 WBC
PLATELET # BLD AUTO: 203 K/UL (ref 150–400)
PMV BLD AUTO: 9.9 FL (ref 8.9–12.9)
POTASSIUM SERPL-SCNC: 4.5 MMOL/L (ref 3.5–5.1)
PROT SERPL-MCNC: 7.6 G/DL (ref 6.4–8.2)
RBC # BLD AUTO: 4.68 M/UL (ref 3.8–5.2)
SODIUM SERPL-SCNC: 141 MMOL/L (ref 136–145)
TROPONIN I SERPL HS-MCNC: 7 NG/L (ref 0–51)
WBC # BLD AUTO: 6.9 K/UL (ref 3.6–11)

## 2025-01-28 PROCEDURE — 6370000000 HC RX 637 (ALT 250 FOR IP): Performed by: EMERGENCY MEDICINE

## 2025-01-28 PROCEDURE — 84484 ASSAY OF TROPONIN QUANT: CPT

## 2025-01-28 PROCEDURE — 93005 ELECTROCARDIOGRAM TRACING: CPT | Performed by: EMERGENCY MEDICINE

## 2025-01-28 PROCEDURE — 99285 EMERGENCY DEPT VISIT HI MDM: CPT

## 2025-01-28 PROCEDURE — 71045 X-RAY EXAM CHEST 1 VIEW: CPT

## 2025-01-28 PROCEDURE — 36415 COLL VENOUS BLD VENIPUNCTURE: CPT

## 2025-01-28 PROCEDURE — 85025 COMPLETE CBC W/AUTO DIFF WBC: CPT

## 2025-01-28 PROCEDURE — 83690 ASSAY OF LIPASE: CPT

## 2025-01-28 PROCEDURE — 6360000004 HC RX CONTRAST MEDICATION: Performed by: EMERGENCY MEDICINE

## 2025-01-28 PROCEDURE — 96374 THER/PROPH/DIAG INJ IV PUSH: CPT

## 2025-01-28 PROCEDURE — 74177 CT ABD & PELVIS W/CONTRAST: CPT

## 2025-01-28 PROCEDURE — 80053 COMPREHEN METABOLIC PANEL: CPT

## 2025-01-28 PROCEDURE — 6360000002 HC RX W HCPCS: Performed by: EMERGENCY MEDICINE

## 2025-01-28 RX ORDER — FENTANYL CITRATE 50 UG/ML
50 INJECTION, SOLUTION INTRAMUSCULAR; INTRAVENOUS ONCE
Status: COMPLETED | OUTPATIENT
Start: 2025-01-28 | End: 2025-01-28

## 2025-01-28 RX ORDER — LOSARTAN POTASSIUM 25 MG/1
50 TABLET ORAL ONCE
Status: COMPLETED | OUTPATIENT
Start: 2025-01-28 | End: 2025-01-28

## 2025-01-28 RX ORDER — IOPAMIDOL 755 MG/ML
100 INJECTION, SOLUTION INTRAVASCULAR ONCE
Status: COMPLETED | OUTPATIENT
Start: 2025-01-28 | End: 2025-01-28

## 2025-01-28 RX ORDER — AMLODIPINE BESYLATE 5 MG/1
5 TABLET ORAL
Status: COMPLETED | OUTPATIENT
Start: 2025-01-28 | End: 2025-01-28

## 2025-01-28 RX ORDER — DICYCLOMINE HCL 20 MG
20 TABLET ORAL 3 TIMES DAILY PRN
Qty: 24 TABLET | Refills: 0 | Status: SHIPPED | OUTPATIENT
Start: 2025-01-28

## 2025-01-28 RX ADMIN — AMLODIPINE BESYLATE 5 MG: 5 TABLET ORAL at 16:56

## 2025-01-28 RX ADMIN — LOSARTAN POTASSIUM 50 MG: 25 TABLET, FILM COATED ORAL at 16:56

## 2025-01-28 RX ADMIN — IOPAMIDOL 100 ML: 755 INJECTION, SOLUTION INTRAVENOUS at 17:44

## 2025-01-28 RX ADMIN — FENTANYL CITRATE 50 MCG: 50 INJECTION INTRAMUSCULAR; INTRAVENOUS at 16:58

## 2025-01-28 ASSESSMENT — PAIN SCALES - GENERAL
PAINLEVEL_OUTOF10: 8
PAINLEVEL_OUTOF10: 8

## 2025-01-28 ASSESSMENT — LIFESTYLE VARIABLES
HOW MANY STANDARD DRINKS CONTAINING ALCOHOL DO YOU HAVE ON A TYPICAL DAY: PATIENT DOES NOT DRINK
HOW OFTEN DO YOU HAVE A DRINK CONTAINING ALCOHOL: NEVER
HOW OFTEN DO YOU HAVE A DRINK CONTAINING ALCOHOL: NEVER

## 2025-01-28 ASSESSMENT — PAIN DESCRIPTION - LOCATION
LOCATION: ABDOMEN
LOCATION: ABDOMEN

## 2025-01-28 ASSESSMENT — PAIN - FUNCTIONAL ASSESSMENT: PAIN_FUNCTIONAL_ASSESSMENT: 0-10

## 2025-01-28 NOTE — ED TRIAGE NOTES
Patient presents to the ED with a complaint abd pain.  Per the patient she went to the PCP with a complaint of all over abd pain. Per the patient she went to her PMD and had her vitals taken and they sent her here because of her elevated blood pressure.  Only complains of abd pain.  + BM.  Denies nausea or diarrhea.

## 2025-01-28 NOTE — ED PROVIDER NOTES
[facility] EMERGENCY DEPARTMENT  EMERGENCY DEPARTMENT ENCOUNTER       Pt Name: Nayana Quiroga  MRN: [id number]  Birthdate 1940  Date of evaluation: 1/28/2025  Provider: Ivan Wang DO   PCP: Robin Brower MD  Note Started: 4:30 PM EST 1/28/25     CHIEF COMPLAINT       Chief Complaint   Patient presents with    Abdominal Pain    Hypertension        HISTORY OF PRESENT ILLNESS: 1 or more elements      History From: Patient, History limited by: none     Nayana Quiroga is a 84 y.o. female past medical history significant for hypertension presenting the emergency department complaining of abdominal pain, elevated blood pressure.  Abdominal pain is described as diffuse, nothing makes it better or worse.  She went to her PCP regarding the abdominal pain and they noted her blood pressure was high and sent her here for further evaluation.       Please See MDM for Additional Details of the HPI/PMH  Nursing Notes were all reviewed and agreed with or any disagreements were addressed in the HPI.     REVIEW OF SYSTEMS        Positives and Pertinent negatives as per HPI.    PAST HISTORY     Past Medical History:  Past Medical History:   Diagnosis Date    Diverticula of colon     GERD (gastroesophageal reflux disease)     Hypertension     Joint pain        Past Surgical History:  Past Surgical History:   Procedure Laterality Date    CATARACT REMOVAL      OTHER SURGICAL HISTORY      tumor on right side    TUMOR REMOVAL      fatty tumor upper left arm       Family History:  Family History   Problem Relation Age of Onset    Diabetes Mother     Breast Cancer Mother     Heart Disease Mother     Hypertension Mother        Social History:  Social History     Tobacco Use    Smoking status: Former     Current packs/day: 0.50     Types: Cigarettes    Smokeless tobacco: Never   Substance Use Topics    Alcohol use: No     Alcohol/week: 0.0 standard drinks of alcohol    Drug use: No       Allergies:  Allergies   Allergen

## 2025-01-29 LAB
EKG ATRIAL RATE: 66 BPM
EKG DIAGNOSIS: NORMAL
EKG P AXIS: 40 DEGREES
EKG P-R INTERVAL: 194 MS
EKG Q-T INTERVAL: 436 MS
EKG QRS DURATION: 80 MS
EKG QTC CALCULATION (BAZETT): 457 MS
EKG R AXIS: 15 DEGREES
EKG T AXIS: 59 DEGREES
EKG VENTRICULAR RATE: 66 BPM

## 2025-02-01 ENCOUNTER — HOSPITAL ENCOUNTER (EMERGENCY)
Facility: HOSPITAL | Age: 85
Discharge: HOME | End: 2025-02-01
Attending: EMERGENCY MEDICINE
Payer: MEDICARE

## 2025-02-01 VITALS
OXYGEN SATURATION: 100 % | SYSTOLIC BLOOD PRESSURE: 194 MMHG | WEIGHT: 210 LBS | HEART RATE: 62 BPM | HEIGHT: 67 IN | RESPIRATION RATE: 18 BRPM | DIASTOLIC BLOOD PRESSURE: 78 MMHG | BODY MASS INDEX: 32.96 KG/M2

## 2025-02-01 DIAGNOSIS — N30.00 ACUTE CYSTITIS WITHOUT HEMATURIA: ICD-10-CM

## 2025-02-01 DIAGNOSIS — R10.2 PELVIC PAIN: Primary | ICD-10-CM

## 2025-02-01 LAB
AMORPH CRY URNS QL MICRO: ABNORMAL
APPEARANCE UR: ABNORMAL
BACTERIA URNS QL MICRO: ABNORMAL /HPF
BILIRUB UR QL CFM: NEGATIVE
COLOR UR: ABNORMAL
EPITH CASTS URNS QL MICRO: ABNORMAL /LPF
GLUCOSE UR STRIP.AUTO-MCNC: NEGATIVE MG/DL
HGB UR QL STRIP: ABNORMAL
KETONES UR QL STRIP.AUTO: NEGATIVE MG/DL
LEUKOCYTE ESTERASE UR QL STRIP.AUTO: NEGATIVE
NITRITE UR QL STRIP.AUTO: NEGATIVE
PH UR STRIP: 5.5 (ref 5–8)
PROT UR STRIP-MCNC: NEGATIVE MG/DL
RBC #/AREA URNS HPF: ABNORMAL /HPF (ref 0–5)
SP GR UR REFRACTOMETRY: 1.03 (ref 1–1.03)
URINE CULTURE IF INDICATED: ABNORMAL
UROBILINOGEN UR QL STRIP.AUTO: 0.2 EU/DL (ref 0.2–1)
WBC URNS QL MICRO: ABNORMAL /HPF (ref 0–4)

## 2025-02-01 PROCEDURE — 81001 URINALYSIS AUTO W/SCOPE: CPT

## 2025-02-01 PROCEDURE — 99283 EMERGENCY DEPT VISIT LOW MDM: CPT

## 2025-02-01 RX ORDER — CEPHALEXIN 500 MG/1
500 CAPSULE ORAL 3 TIMES DAILY
Qty: 21 CAPSULE | Refills: 0 | Status: SHIPPED | OUTPATIENT
Start: 2025-02-01

## 2025-02-01 ASSESSMENT — PAIN DESCRIPTION - ORIENTATION: ORIENTATION: LOWER

## 2025-02-01 ASSESSMENT — LIFESTYLE VARIABLES
HOW MANY STANDARD DRINKS CONTAINING ALCOHOL DO YOU HAVE ON A TYPICAL DAY: PATIENT DOES NOT DRINK
HOW OFTEN DO YOU HAVE A DRINK CONTAINING ALCOHOL: NEVER

## 2025-02-01 ASSESSMENT — ENCOUNTER SYMPTOMS
ABDOMINAL PAIN: 0
VOMITING: 0
SORE THROAT: 0
EYE REDNESS: 0
SHORTNESS OF BREATH: 0
DIARRHEA: 0
COUGH: 0
NAUSEA: 0

## 2025-02-01 ASSESSMENT — PAIN - FUNCTIONAL ASSESSMENT: PAIN_FUNCTIONAL_ASSESSMENT: 0-10

## 2025-02-01 ASSESSMENT — PAIN DESCRIPTION - DESCRIPTORS: DESCRIPTORS: PRESSURE

## 2025-02-01 ASSESSMENT — PAIN SCALES - GENERAL: PAINLEVEL_OUTOF10: 5

## 2025-02-01 ASSESSMENT — PAIN DESCRIPTION - LOCATION: LOCATION: ABDOMEN

## 2025-02-01 NOTE — ED TRIAGE NOTES
Patient presents to the ED today with c/o lower pelvic pressure, worse when lying down. She states she doesn't have the pain when she stands. She denies nausea/vomiting. Having regular bowel movements. Rates pain a 5/10. BP elevated due to not taking BP medication this morning.

## 2025-05-21 ENCOUNTER — APPOINTMENT (OUTPATIENT)
Facility: HOSPITAL | Age: 85
End: 2025-05-21
Payer: MEDICARE

## 2025-05-21 ENCOUNTER — HOSPITAL ENCOUNTER (EMERGENCY)
Facility: HOSPITAL | Age: 85
Discharge: HOME | End: 2025-05-21
Attending: FAMILY MEDICINE
Payer: MEDICARE

## 2025-05-21 VITALS
DIASTOLIC BLOOD PRESSURE: 77 MMHG | HEIGHT: 67 IN | RESPIRATION RATE: 16 BRPM | BODY MASS INDEX: 31.39 KG/M2 | HEART RATE: 63 BPM | WEIGHT: 200 LBS | TEMPERATURE: 97.6 F | OXYGEN SATURATION: 98 % | SYSTOLIC BLOOD PRESSURE: 210 MMHG

## 2025-05-21 DIAGNOSIS — N94.9 VAGINAL DISCOMFORT: ICD-10-CM

## 2025-05-21 DIAGNOSIS — N39.0 URINARY TRACT INFECTION WITHOUT HEMATURIA, SITE UNSPECIFIED: ICD-10-CM

## 2025-05-21 DIAGNOSIS — D25.9 UTERINE LEIOMYOMA, UNSPECIFIED LOCATION: Primary | ICD-10-CM

## 2025-05-21 LAB
ALBUMIN SERPL-MCNC: 2.9 G/DL (ref 3.5–5)
ALBUMIN/GLOB SERPL: 0.8 (ref 1.1–2.2)
ALP SERPL-CCNC: 98 U/L (ref 45–117)
ALT SERPL-CCNC: 10 U/L (ref 12–78)
ANION GAP SERPL CALC-SCNC: 7 MMOL/L (ref 2–12)
APPEARANCE UR: CLEAR
AST SERPL-CCNC: 7 U/L (ref 15–37)
BACTERIA URNS QL MICRO: ABNORMAL /HPF
BASOPHILS # BLD: 0.02 K/UL (ref 0–0.1)
BASOPHILS NFR BLD: 0.5 % (ref 0–1)
BILIRUB SERPL-MCNC: 0.4 MG/DL (ref 0.2–1)
BILIRUB UR QL: NEGATIVE
BUN SERPL-MCNC: 14 MG/DL (ref 6–20)
BUN/CREAT SERPL: 12 (ref 12–20)
CALCIUM SERPL-MCNC: 9.1 MG/DL (ref 8.5–10.1)
CHLORIDE SERPL-SCNC: 106 MMOL/L (ref 97–108)
CLUE CELLS VAG QL WET PREP: NORMAL
CO2 SERPL-SCNC: 28 MMOL/L (ref 21–32)
COLOR UR: ABNORMAL
CREAT SERPL-MCNC: 1.18 MG/DL (ref 0.55–1.02)
DIFFERENTIAL METHOD BLD: ABNORMAL
EOSINOPHIL # BLD: 0.04 K/UL (ref 0–0.4)
EOSINOPHIL NFR BLD: 0.9 % (ref 0–0.7)
EPITH CASTS URNS QL MICRO: ABNORMAL /LPF
ERYTHROCYTE [DISTWIDTH] IN BLOOD BY AUTOMATED COUNT: 15.8 % (ref 11.5–14.5)
GLOBULIN SER CALC-MCNC: 3.7 G/DL (ref 2–4)
GLUCOSE SERPL-MCNC: 118 MG/DL (ref 65–100)
GLUCOSE UR STRIP.AUTO-MCNC: NEGATIVE MG/DL
HCT VFR BLD AUTO: 31.4 % (ref 35–47)
HGB BLD-MCNC: 9.6 G/DL (ref 11.5–16)
HGB UR QL STRIP: NEGATIVE
IMM GRANULOCYTES # BLD AUTO: 0.03 K/UL (ref 0–0.04)
IMM GRANULOCYTES NFR BLD AUTO: 0.7 % (ref 0–0.5)
KETONES UR QL STRIP.AUTO: NEGATIVE MG/DL
LEUKOCYTE ESTERASE UR QL STRIP.AUTO: NEGATIVE
LIPASE SERPL-CCNC: 59 U/L (ref 13–75)
LYMPHOCYTES # BLD: 1.46 K/UL (ref 0.8–3.5)
LYMPHOCYTES NFR BLD: 33.3 % (ref 12–49)
MCH RBC QN AUTO: 25 PG (ref 26–34)
MCHC RBC AUTO-ENTMCNC: 30.6 G/DL (ref 30–36.5)
MCV RBC AUTO: 81.8 FL (ref 80–99)
MONOCYTES # BLD: 0.35 K/UL (ref 0–1)
MONOCYTES NFR BLD: 8 % (ref 5–13)
NEUTS SEG # BLD: 2.49 K/UL (ref 1.8–8)
NEUTS SEG NFR BLD: 56.6 % (ref 32–75)
NITRITE UR QL STRIP.AUTO: POSITIVE
NRBC # BLD: 0 K/UL (ref 0–0.01)
NRBC BLD-RTO: 0 PER 100 WBC
PH UR STRIP: 6 (ref 5–8)
PLATELET # BLD AUTO: 151 K/UL (ref 150–400)
PMV BLD AUTO: 9.6 FL (ref 8.9–12.9)
POTASSIUM SERPL-SCNC: 4 MMOL/L (ref 3.5–5.1)
PROT SERPL-MCNC: 6.6 G/DL (ref 6.4–8.2)
PROT UR STRIP-MCNC: NEGATIVE MG/DL
RBC # BLD AUTO: 3.84 M/UL (ref 3.8–5.2)
RBC #/AREA URNS HPF: ABNORMAL /HPF (ref 0–5)
SODIUM SERPL-SCNC: 141 MMOL/L (ref 136–145)
SP GR UR REFRACTOMETRY: 1.03 (ref 1–1.03)
T VAGINALIS VAG QL WET PREP: NORMAL
URINE CULTURE IF INDICATED: ABNORMAL
UROBILINOGEN UR QL STRIP.AUTO: 0.2 EU/DL (ref 0.2–1)
WBC # BLD AUTO: 4.4 K/UL (ref 3.6–11)
WBC URNS QL MICRO: ABNORMAL /HPF (ref 0–4)
YEAST WET PREP: NORMAL

## 2025-05-21 PROCEDURE — 80053 COMPREHEN METABOLIC PANEL: CPT

## 2025-05-21 PROCEDURE — 83690 ASSAY OF LIPASE: CPT

## 2025-05-21 PROCEDURE — 85025 COMPLETE CBC W/AUTO DIFF WBC: CPT

## 2025-05-21 PROCEDURE — 99285 EMERGENCY DEPT VISIT HI MDM: CPT

## 2025-05-21 PROCEDURE — 81001 URINALYSIS AUTO W/SCOPE: CPT

## 2025-05-21 PROCEDURE — 36415 COLL VENOUS BLD VENIPUNCTURE: CPT

## 2025-05-21 PROCEDURE — 74177 CT ABD & PELVIS W/CONTRAST: CPT

## 2025-05-21 PROCEDURE — 6360000004 HC RX CONTRAST MEDICATION: Performed by: FAMILY MEDICINE

## 2025-05-21 PROCEDURE — 87210 SMEAR WET MOUNT SALINE/INK: CPT

## 2025-05-21 RX ORDER — IOPAMIDOL 755 MG/ML
100 INJECTION, SOLUTION INTRAVASCULAR ONCE
Status: COMPLETED | OUTPATIENT
Start: 2025-05-21 | End: 2025-05-21

## 2025-05-21 RX ORDER — CEPHALEXIN 500 MG/1
500 CAPSULE ORAL 3 TIMES DAILY
Qty: 21 CAPSULE | Refills: 0 | Status: SHIPPED | OUTPATIENT
Start: 2025-05-21 | End: 2025-05-28

## 2025-05-21 RX ADMIN — IOPAMIDOL 100 ML: 755 INJECTION, SOLUTION INTRAVENOUS at 08:19

## 2025-05-21 ASSESSMENT — PAIN - FUNCTIONAL ASSESSMENT: PAIN_FUNCTIONAL_ASSESSMENT: NONE - DENIES PAIN

## 2025-05-21 NOTE — ED PROVIDER NOTES
take these medications      * cephALEXin 500 MG capsule  Commonly known as: Keflex  Take 1 capsule by mouth 3 times daily  What changed: Another medication with the same name was added. Make sure you understand how and when to take each.     * cephALEXin 500 MG capsule  Commonly known as: KEFLEX  Take 1 capsule by mouth 3 times daily for 7 days  What changed: You were already taking a medication with the same name, and this prescription was added. Make sure you understand how and when to take each.           * This list has 2 medication(s) that are the same as other medications prescribed for you. Read the directions carefully, and ask your doctor or other care provider to review them with you.                ASK your doctor about these medications      albuterol sulfate  (90 Base) MCG/ACT inhaler  Commonly known as: Ventolin HFA  Inhale 2 puffs into the lungs 4 times daily as needed for Wheezing     cyanocobalamin 500 MCG tablet     dicyclomine 20 MG tablet  Commonly known as: BENTYL  Take 1 tablet by mouth 3 times daily as needed (pain)     donepezil 5 MG tablet  Commonly known as: ARICEPT     losartan 50 MG tablet  Commonly known as: COZAAR  Take 1 tablet by mouth daily     lovastatin 20 MG tablet  Commonly known as: MEVACOR     mirabegron 25 MG Tb24  Commonly known as: MYRBETRIQ     omeprazole 40 MG delayed release capsule  Commonly known as: PRILOSEC     polyethylene glycol 17 GM/SCOOP powder  Commonly known as: GLYCOLAX               Where to Get Your Medications        These medications were sent to [facility] [address] [place] [address] - [address] [facility] - P [phone] - F [phone]  [address] [facility] [address]      Phone: [phone]   cephALEXin 500 MG capsule           DISCONTINUED MEDICATIONS:  Discharge Medication List as of 5/21/2025 10:19 AM          I am the Primary Clinician of Record.   Doron Llanos MD (electronically signed)      (Please note that parts

## 2025-05-21 NOTE — ED NOTES
I have reviewed discharge instructions with the patient and her family. The patient and family verbalized understanding. Discharge medications discussed with patient. No questions at this time. Ambulated without difficulty.

## 2025-05-21 NOTE — ED TRIAGE NOTES
Patient came in with a c/o \"something moving way up in her vagina\". Patient states that she started to feel the movement about 2300 last night. Patient denies any pain but is having discomfort by the movement. She also denies any vaginal discharge or bleeding.

## 2025-06-05 ENCOUNTER — HOSPITAL ENCOUNTER (EMERGENCY)
Facility: HOSPITAL | Age: 85
Discharge: HOME | End: 2025-06-05
Attending: EMERGENCY MEDICINE
Payer: MEDICARE

## 2025-06-05 VITALS
SYSTOLIC BLOOD PRESSURE: 191 MMHG | HEART RATE: 79 BPM | DIASTOLIC BLOOD PRESSURE: 82 MMHG | RESPIRATION RATE: 16 BRPM | TEMPERATURE: 98.2 F | BODY MASS INDEX: 34.01 KG/M2 | HEIGHT: 67 IN | WEIGHT: 216.7 LBS | OXYGEN SATURATION: 96 %

## 2025-06-05 DIAGNOSIS — E87.6 HYPOKALEMIA: ICD-10-CM

## 2025-06-05 DIAGNOSIS — N94.9 VAGINAL SYMPTOM: Primary | ICD-10-CM

## 2025-06-05 LAB
ALBUMIN SERPL-MCNC: 3.1 G/DL (ref 3.5–5)
ALBUMIN/GLOB SERPL: 0.8 (ref 1.1–2.2)
ALP SERPL-CCNC: 104 U/L (ref 45–117)
ALT SERPL-CCNC: 14 U/L (ref 12–78)
ANION GAP SERPL CALC-SCNC: 5 MMOL/L (ref 2–12)
APPEARANCE UR: CLEAR
AST SERPL-CCNC: 25 U/L (ref 15–37)
BACTERIA URNS QL MICRO: NEGATIVE /HPF
BASOPHILS # BLD: 0.01 K/UL (ref 0–0.1)
BASOPHILS NFR BLD: 0.2 % (ref 0–1)
BILIRUB SERPL-MCNC: 0.3 MG/DL (ref 0.2–1)
BILIRUB UR QL: NEGATIVE
BUN SERPL-MCNC: 13 MG/DL (ref 6–20)
BUN/CREAT SERPL: 11 (ref 12–20)
CALCIUM SERPL-MCNC: 9 MG/DL (ref 8.5–10.1)
CHLORIDE SERPL-SCNC: 106 MMOL/L (ref 97–108)
CO2 SERPL-SCNC: 28 MMOL/L (ref 21–32)
COLOR UR: ABNORMAL
CREAT SERPL-MCNC: 1.19 MG/DL (ref 0.55–1.02)
DIFFERENTIAL METHOD BLD: ABNORMAL
EOSINOPHIL # BLD: 0.03 K/UL (ref 0–0.4)
EOSINOPHIL NFR BLD: 0.6 % (ref 0–0.7)
EPITH CASTS URNS QL MICRO: ABNORMAL /LPF
ERYTHROCYTE [DISTWIDTH] IN BLOOD BY AUTOMATED COUNT: 15.8 % (ref 11.5–14.5)
GLOBULIN SER CALC-MCNC: 3.8 G/DL (ref 2–4)
GLUCOSE SERPL-MCNC: 128 MG/DL (ref 65–100)
GLUCOSE UR STRIP.AUTO-MCNC: NEGATIVE MG/DL
HCT VFR BLD AUTO: 30.5 % (ref 35–47)
HGB BLD-MCNC: 9.1 G/DL (ref 11.5–16)
HGB UR QL STRIP: NEGATIVE
IMM GRANULOCYTES # BLD AUTO: 0.02 K/UL (ref 0–0.04)
IMM GRANULOCYTES NFR BLD AUTO: 0.4 % (ref 0–0.5)
KETONES UR QL STRIP.AUTO: ABNORMAL MG/DL
LACTATE SERPL-SCNC: 1 MMOL/L (ref 0.4–2)
LEUKOCYTE ESTERASE UR QL STRIP.AUTO: NEGATIVE
LIPASE SERPL-CCNC: 73 U/L (ref 13–75)
LYMPHOCYTES # BLD: 1.69 K/UL (ref 0.8–3.5)
LYMPHOCYTES NFR BLD: 34.3 % (ref 12–49)
MCH RBC QN AUTO: 24.6 PG (ref 26–34)
MCHC RBC AUTO-ENTMCNC: 29.8 G/DL (ref 30–36.5)
MCV RBC AUTO: 82.4 FL (ref 80–99)
MONOCYTES # BLD: 0.36 K/UL (ref 0–1)
MONOCYTES NFR BLD: 7.3 % (ref 5–13)
NEUTS SEG # BLD: 2.82 K/UL (ref 1.8–8)
NEUTS SEG NFR BLD: 57.2 % (ref 32–75)
NITRITE UR QL STRIP.AUTO: NEGATIVE
NRBC # BLD: 0 K/UL (ref 0–0.01)
NRBC BLD-RTO: 0 PER 100 WBC
PH UR STRIP: 6 (ref 5–8)
PLATELET # BLD AUTO: 165 K/UL (ref 150–400)
PMV BLD AUTO: 10.1 FL (ref 8.9–12.9)
POTASSIUM SERPL-SCNC: 3.4 MMOL/L (ref 3.5–5.1)
PROT SERPL-MCNC: 6.9 G/DL (ref 6.4–8.2)
PROT UR STRIP-MCNC: NEGATIVE MG/DL
RBC # BLD AUTO: 3.7 M/UL (ref 3.8–5.2)
RBC #/AREA URNS HPF: ABNORMAL /HPF (ref 0–5)
SODIUM SERPL-SCNC: 139 MMOL/L (ref 136–145)
SP GR UR REFRACTOMETRY: 1.02 (ref 1–1.03)
URINE CULTURE IF INDICATED: ABNORMAL
UROBILINOGEN UR QL STRIP.AUTO: 0.2 EU/DL (ref 0.2–1)
WBC # BLD AUTO: 4.9 K/UL (ref 3.6–11)
WBC URNS QL MICRO: ABNORMAL /HPF (ref 0–4)

## 2025-06-05 PROCEDURE — 81001 URINALYSIS AUTO W/SCOPE: CPT

## 2025-06-05 PROCEDURE — 85025 COMPLETE CBC W/AUTO DIFF WBC: CPT

## 2025-06-05 PROCEDURE — 83690 ASSAY OF LIPASE: CPT

## 2025-06-05 PROCEDURE — 83605 ASSAY OF LACTIC ACID: CPT

## 2025-06-05 PROCEDURE — 6370000000 HC RX 637 (ALT 250 FOR IP): Performed by: EMERGENCY MEDICINE

## 2025-06-05 PROCEDURE — 36415 COLL VENOUS BLD VENIPUNCTURE: CPT

## 2025-06-05 PROCEDURE — 80053 COMPREHEN METABOLIC PANEL: CPT

## 2025-06-05 PROCEDURE — 99283 EMERGENCY DEPT VISIT LOW MDM: CPT

## 2025-06-05 RX ORDER — POTASSIUM CHLORIDE 750 MG/1
10 TABLET, EXTENDED RELEASE ORAL DAILY
Qty: 30 TABLET | Refills: 0 | Status: SHIPPED | OUTPATIENT
Start: 2025-06-05

## 2025-06-05 RX ORDER — ACETAMINOPHEN 500 MG
1000 TABLET ORAL
Status: COMPLETED | OUTPATIENT
Start: 2025-06-05 | End: 2025-06-05

## 2025-06-05 RX ADMIN — ACETAMINOPHEN 1000 MG: 500 TABLET ORAL at 22:47

## 2025-06-05 ASSESSMENT — ENCOUNTER SYMPTOMS
ABDOMINAL PAIN: 0
NAUSEA: 0
SHORTNESS OF BREATH: 0
VOMITING: 0
CONSTIPATION: 0
DIARRHEA: 0

## 2025-06-05 ASSESSMENT — PAIN - FUNCTIONAL ASSESSMENT: PAIN_FUNCTIONAL_ASSESSMENT: ACTIVITIES ARE NOT PREVENTED

## 2025-06-05 ASSESSMENT — PAIN SCALES - GENERAL: PAINLEVEL_OUTOF10: 5

## 2025-06-05 ASSESSMENT — PAIN DESCRIPTION - DESCRIPTORS: DESCRIPTORS: PRESSURE

## 2025-06-05 ASSESSMENT — PAIN DESCRIPTION - LOCATION: LOCATION: ABDOMEN

## 2025-06-05 ASSESSMENT — PAIN DESCRIPTION - ORIENTATION: ORIENTATION: LOWER

## 2025-06-06 NOTE — ED PROVIDER NOTES
[facility] EMERGENCY DEPARTMENT  EMERGENCY DEPARTMENT ENCOUNTER       Pt Name: Nayana Quiroga  MRN: [id number]  Birthdate 1940  Date of evaluation: 6/5/2025  Provider: Stephen Méndez MD   PCP: Robin Brower MD  Note Started: 11:43 PM 6/5/25      FINAL IMPRESSION     1. Vaginal symptom    2. Hypokalemia          DISPOSITION/PLAN     Disposition:  DISPOSITION Decision To Discharge 06/05/2025 11:41:19 PM   DISPOSITION CONDITION Stable           Discharge Note: The patient is stable for discharge home. The signs, symptoms, diagnosis, and discharge instructions have been discussed, understanding conveyed, and agreed upon. The patient is to follow up as recommended or return to ER should their symptoms worsen.      PATIENT REFERRED TO:  Robin Brower MD  [address] [facility] [address] [phone]    Schedule an appointment as soon as possible for a visit in 1 day  For Follow Up    Fracisco Hubbard MD  [address]   [facility] [address] [phone]    Schedule an appointment as soon as possible for a visit in 1 day  For Follow Up            DISCHARGE MEDICATIONS:     Medication List        START taking these medications      potassium chloride 10 MEQ extended release tablet  Commonly known as: KLOR-CON M  Take 1 tablet by mouth daily            ASK your doctor about these medications      albuterol sulfate  (90 Base) MCG/ACT inhaler  Commonly known as: Ventolin HFA  Inhale 2 puffs into the lungs 4 times daily as needed for Wheezing     cephALEXin 500 MG capsule  Commonly known as: Keflex  Take 1 capsule by mouth 3 times daily     cyanocobalamin 500 MCG tablet     dicyclomine 20 MG tablet  Commonly known as: BENTYL  Take 1 tablet by mouth 3 times daily as needed (pain)     donepezil 5 MG tablet  Commonly known as: ARICEPT     losartan 50 MG tablet  Commonly known as: COZAAR  Take 1 tablet by mouth daily     lovastatin 20 MG tablet  Commonly known as: MEVACOR     mirabegron 25 MG  Tb24  Commonly known as: MYRBETRIQ     omeprazole 40 MG delayed release capsule  Commonly known as: PRILOSEC     polyethylene glycol 17 GM/SCOOP powder  Commonly known as: GLYCOLAX               Where to Get Your Medications        These medications were sent to [facility] [address] [place] [address] - [address] [facility] - P [phone] - F [phone]  [address] [facility] [address]      Phone: [phone]   potassium chloride 10 MEQ extended release tablet           DISCONTINUED MEDICATIONS:  Current Discharge Medication List          Return to ED if worse      CHIEF COMPLAINT       Chief Complaint   Patient presents with    Spasms        HISTORY OF PRESENT ILLNESS: 1 or more elements      History From: Patient and Patient's Daughter  None     HPI    Nayana Quiroga is a 85 y.o. female who presents complaint of continued vaginal symptoms, she reports this been going on for some time she actually saw OB/GYN for follow-up last week and was seen here in the ED had a CT scan that showed uterine fibroids, she was referred for an ultrasound by the OB/GYN.  Vaginal swabs that time were negative.  She reports send this evening symptoms recurred for like with a rolling sensation and discomfort in the vaginal area no vaginal bleeding discharge no UTI symptoms no abdominal pain nausea vomiting diarrhea denies constipation with normal bowel movements.  Denies any fevers chills.  Denies any incomplete bladder emptying symptoms. she reports no abdominal surgeries      There are no other complaints, changes, or physical findings at this time.     Nursing Notes were all reviewed and agreed with or any disagreements were addressed in the HPI.     REVIEW OF SYSTEMS      Review of Systems   Constitutional:  Negative for chills and fever.   Respiratory:  Negative for shortness of breath.    Cardiovascular:  Negative for chest pain.   Gastrointestinal:  Negative for abdominal pain, constipation, diarrhea, nausea

## 2025-06-06 NOTE — DISCHARGE INSTR - COC
Continuity of Care Form    Patient Name: Nayana Quiroga   :  1940  MRN:  [id number]    Admit date:  2025  Discharge date:  ***    Code Status Order: Prior   Advance Directives:     Admitting Physician:  No admitting provider for patient encounter.  PCP: Robin Brower MD    Discharging Nurse: ***  Discharging Hospital Unit/Room#: ED02/02  Discharging Unit Phone Number: ***    Emergency Contact:   Extended Emergency Contact Information  Primary Emergency Contact: Kaylin Quiroga  Home Phone: [phone]  Mobile Phone: [phone]  Relation: Child    Past Surgical History:  Past Surgical History:   Procedure Laterality Date    CATARACT REMOVAL      OTHER SURGICAL HISTORY      tumor on right side    TUMOR REMOVAL      fatty tumor upper left arm       Immunization History:   Immunization History   Administered Date(s) Administered    COVID-19, PFIZER PURPLE top, DILUTE for use, (age 12 y+), 30mcg/0.3mL 2021, 2021, 10/03/2021       Active Problems:  Patient Active Problem List   Diagnosis Code    Diverticulosis of large intestine without hemorrhage K57.30    Essential hypertension I10    Gastroesophageal reflux disease without esophagitis K21.9    Lumbar spinal stenosis M48.061    Cervical spinal stenosis M48.02    Mixed hyperlipidemia E78.2    Non morbid obesity due to excess calories E66.09    Syncope R55    ARTURO (acute kidney injury) N17.9    Bilateral carotid artery stenosis I65.23    Microcytic anemia D50.9    Urine incontinence R32    Cognitive impairment R41.89    Syncope and collapse R55       Isolation/Infection:   Isolation            No Isolation          Patient Infection Status    No active infections.   Resolved       Infection Onset Added Last Indicated Last Indicated By Resolved Resolved By    COVID-19 23 COVID-19 & Influenza Combo 23 Infection                          Nurse Assessment:  Last Vital Signs: BP (!) 191/82   Pulse 79   Temp 98.2  °F (36.8 °C) (Oral)   Resp 16   Ht 1.702 m (5' 7\")   Wt 98.3 kg (216 lb 11.2 oz)   SpO2 96%   BMI 33.94 kg/m²     Last documented pain score (0-10 scale): Pain Level: 5  Last Weight:   Wt Readings from Last 1 Encounters:   25 98.3 kg (216 lb 11.2 oz)     Mental Status:  {IP PT MENTAL STATUS:18994}    IV Access:  { KRISHNA IV ACCESS:212968922}    Nursing Mobility/ADLs:  Walking   {CHP DME ADLs:732791122}  Transfer  {CHP DME ADLs:101692739}  Bathing  {CHP DME ADLs:774546762}  Dressing  {CHP DME ADLs:065963685}  Toileting  {CHP DME ADLs:231158242}  Feeding  {CHP DME ADLs:092948980}  Med Admin  {CHP DME ADLs:325279169}  Med Delivery   {[facility] MED Delivery:548263135}    Wound Care Documentation and Therapy:        Elimination:  Continence:   Bowel: {YES / NO:}  Bladder: {YES / NO:}  Urinary Catheter: {Urinary Catheter:021653760}   Colostomy/Ileostomy/Ileal Conduit: {YES / NO:}       Date of Last BM: ***  No intake or output data in the 24 hours ending 25 2354  No intake/output data recorded.    Safety Concerns:     { KRISHNA Safety Concerns:142487875}    Impairments/Disabilities:      {[facility] Impairments/Disabilities:367003476}    Nutrition Therapy:  Current Nutrition Therapy:   { KRISHNA Diet List:510486433}    Routes of Feeding: {CHP DME Other Feedings:555567594}  Liquids: {Slp liquid thickness:94339}  Daily Fluid Restriction: {CHP DME Yes amt example:823294355}  Last Modified Barium Swallow with Video (Video Swallowing Test): {Done Not Done Date:}    Treatments at the Time of Hospital Discharge:   Respiratory Treatments: ***  Oxygen Therapy:  {Therapy; copd oxygen:87244}  Ventilator:    {[facility] Vent List:723373271}    Rehab Therapies: {THERAPEUTIC INTERVENTION:8589282289}  Weight Bearing Status/Restrictions: {MH CC Weight Bearin}  Other Medical Equipment (for information only, NOT a DME order):  {EQUIPMENT:602897052}  Other Treatments: ***    Patient's personal belongings

## 2025-06-06 NOTE — DISCHARGE INSTRUCTIONS
Take Tylenol 500 to 650 mg every 6 hours as needed for pain.  See your OB/GYN doctor for follow-up.

## 2025-06-25 ENCOUNTER — TRANSCRIBE ORDERS (OUTPATIENT)
Facility: HOSPITAL | Age: 85
End: 2025-06-25

## 2025-06-25 DIAGNOSIS — N89.8 VAGINAL MASS: Primary | ICD-10-CM

## 2025-06-25 DIAGNOSIS — R10.30 LOWER ABDOMINAL PAIN: ICD-10-CM

## 2025-06-27 ENCOUNTER — APPOINTMENT (OUTPATIENT)
Facility: HOSPITAL | Age: 85
End: 2025-06-27
Payer: MEDICARE

## 2025-06-27 ENCOUNTER — HOSPITAL ENCOUNTER (EMERGENCY)
Facility: HOSPITAL | Age: 85
Discharge: HOME | End: 2025-06-27
Attending: FAMILY MEDICINE | Admitting: FAMILY MEDICINE
Payer: MEDICARE

## 2025-06-27 VITALS
HEART RATE: 72 BPM | SYSTOLIC BLOOD PRESSURE: 237 MMHG | OXYGEN SATURATION: 96 % | HEIGHT: 67 IN | WEIGHT: 210 LBS | TEMPERATURE: 97.5 F | DIASTOLIC BLOOD PRESSURE: 75 MMHG | BODY MASS INDEX: 32.96 KG/M2 | RESPIRATION RATE: 22 BRPM

## 2025-06-27 DIAGNOSIS — R10.2 PELVIC PAIN: Primary | ICD-10-CM

## 2025-06-27 LAB
ALBUMIN SERPL-MCNC: 3.1 G/DL (ref 3.5–5)
ALBUMIN/GLOB SERPL: 0.9 (ref 1.1–2.2)
ALP SERPL-CCNC: 107 U/L (ref 45–117)
ALT SERPL-CCNC: 15 U/L (ref 12–78)
ANION GAP SERPL CALC-SCNC: 9 MMOL/L (ref 2–12)
APPEARANCE UR: CLEAR
AST SERPL-CCNC: 10 U/L (ref 15–37)
BACTERIA URNS QL MICRO: ABNORMAL /HPF
BASOPHILS # BLD: 0.01 K/UL (ref 0–0.1)
BASOPHILS NFR BLD: 0.2 % (ref 0–1)
BILIRUB SERPL-MCNC: 0.3 MG/DL (ref 0.2–1)
BILIRUB UR QL: NEGATIVE
BUN SERPL-MCNC: 14 MG/DL (ref 6–20)
BUN/CREAT SERPL: 11 (ref 12–20)
CALCIUM SERPL-MCNC: 9 MG/DL (ref 8.5–10.1)
CHLORIDE SERPL-SCNC: 106 MMOL/L (ref 97–108)
CO2 SERPL-SCNC: 26 MMOL/L (ref 21–32)
COLOR UR: ABNORMAL
CREAT SERPL-MCNC: 1.24 MG/DL (ref 0.55–1.02)
DIFFERENTIAL METHOD BLD: ABNORMAL
EOSINOPHIL # BLD: 0.03 K/UL (ref 0–0.4)
EOSINOPHIL NFR BLD: 0.6 % (ref 0–0.7)
EPITH CASTS URNS QL MICRO: ABNORMAL /LPF
ERYTHROCYTE [DISTWIDTH] IN BLOOD BY AUTOMATED COUNT: 15.9 % (ref 11.5–14.5)
GLOBULIN SER CALC-MCNC: 3.6 G/DL (ref 2–4)
GLUCOSE SERPL-MCNC: 120 MG/DL (ref 65–100)
GLUCOSE UR STRIP.AUTO-MCNC: NEGATIVE MG/DL
HCT VFR BLD AUTO: 32.4 % (ref 35–47)
HGB BLD-MCNC: 9.6 G/DL (ref 11.5–16)
HGB UR QL STRIP: NEGATIVE
IMM GRANULOCYTES # BLD AUTO: 0.01 K/UL (ref 0–0.04)
IMM GRANULOCYTES NFR BLD AUTO: 0.2 % (ref 0–0.5)
KETONES UR QL STRIP.AUTO: NEGATIVE MG/DL
LACTATE SERPL-SCNC: 1 MMOL/L (ref 0.4–2)
LEUKOCYTE ESTERASE UR QL STRIP.AUTO: NEGATIVE
LYMPHOCYTES # BLD: 1.95 K/UL (ref 0.8–3.5)
LYMPHOCYTES NFR BLD: 40 % (ref 12–49)
MCH RBC QN AUTO: 24.5 PG (ref 26–34)
MCHC RBC AUTO-ENTMCNC: 29.6 G/DL (ref 30–36.5)
MCV RBC AUTO: 82.7 FL (ref 80–99)
MONOCYTES # BLD: 0.37 K/UL (ref 0–1)
MONOCYTES NFR BLD: 7.6 % (ref 5–13)
NEUTS SEG # BLD: 2.5 K/UL (ref 1.8–8)
NEUTS SEG NFR BLD: 51.4 % (ref 32–75)
NITRITE UR QL STRIP.AUTO: POSITIVE
NRBC # BLD: 0 K/UL (ref 0–0.01)
NRBC BLD-RTO: 0 PER 100 WBC
PH UR STRIP: 6 (ref 5–8)
PLATELET # BLD AUTO: 154 K/UL (ref 150–400)
PMV BLD AUTO: 9.6 FL (ref 8.9–12.9)
POTASSIUM SERPL-SCNC: 3.9 MMOL/L (ref 3.5–5.1)
PROT SERPL-MCNC: 6.7 G/DL (ref 6.4–8.2)
PROT UR STRIP-MCNC: NEGATIVE MG/DL
RBC # BLD AUTO: 3.92 M/UL (ref 3.8–5.2)
RBC #/AREA URNS HPF: ABNORMAL /HPF (ref 0–5)
SODIUM SERPL-SCNC: 141 MMOL/L (ref 136–145)
SP GR UR REFRACTOMETRY: 1.02 (ref 1–1.03)
URINE CULTURE IF INDICATED: ABNORMAL
UROBILINOGEN UR QL STRIP.AUTO: 0.2 EU/DL (ref 0.2–1)
WBC # BLD AUTO: 4.9 K/UL (ref 3.6–11)
WBC URNS QL MICRO: ABNORMAL /HPF (ref 0–4)
YEAST BUDDING URNS QL: PRESENT

## 2025-06-27 PROCEDURE — 80053 COMPREHEN METABOLIC PANEL: CPT

## 2025-06-27 PROCEDURE — 83605 ASSAY OF LACTIC ACID: CPT

## 2025-06-27 PROCEDURE — 85025 COMPLETE CBC W/AUTO DIFF WBC: CPT

## 2025-06-27 PROCEDURE — 6370000000 HC RX 637 (ALT 250 FOR IP): Performed by: FAMILY MEDICINE

## 2025-06-27 PROCEDURE — 74176 CT ABD & PELVIS W/O CONTRAST: CPT

## 2025-06-27 PROCEDURE — 36415 COLL VENOUS BLD VENIPUNCTURE: CPT

## 2025-06-27 PROCEDURE — 81001 URINALYSIS AUTO W/SCOPE: CPT

## 2025-06-27 PROCEDURE — 99284 EMERGENCY DEPT VISIT MOD MDM: CPT

## 2025-06-27 RX ORDER — PHENAZOPYRIDINE HYDROCHLORIDE 100 MG/1
100 TABLET, FILM COATED ORAL 3 TIMES DAILY PRN
Qty: 8 TABLET | Refills: 0 | Status: SHIPPED | OUTPATIENT
Start: 2025-06-27 | End: 2025-06-30

## 2025-06-27 RX ORDER — PHENAZOPYRIDINE HYDROCHLORIDE 100 MG/1
200 TABLET, FILM COATED ORAL
Status: COMPLETED | OUTPATIENT
Start: 2025-06-27 | End: 2025-06-27

## 2025-06-27 RX ADMIN — PHENAZOPYRIDINE 200 MG: 100 TABLET ORAL at 02:00

## 2025-06-27 ASSESSMENT — PAIN SCALES - GENERAL: PAINLEVEL_OUTOF10: 0

## 2025-06-27 ASSESSMENT — PAIN - FUNCTIONAL ASSESSMENT: PAIN_FUNCTIONAL_ASSESSMENT: 0-10

## 2025-06-27 ASSESSMENT — PAIN DESCRIPTION - LOCATION: LOCATION: ABDOMEN

## 2025-06-27 NOTE — ED TRIAGE NOTES
Patient arrived to ED with reports of \"feeling like something is moving inside my stomach and vagina\". Patient reports normal bowel movements and normal urinary habits, denies any pain, burning, or difficulty with urination. Patient denies nausea, vomiting, and diarrhea. Patient is A&Ox4 and ambulated to the assigned room without difficulty.

## 2025-06-27 NOTE — ED PROVIDER NOTES
[facility] EMERGENCY DEPARTMENT  EMERGENCY DEPARTMENT ENCOUNTER       Pt Name: Nayana Quiroga  MRN: [id number]  Birthdate 1940  Date of evaluation: 6/27/2025  Provider: Sofia Altamirano MD   PCP: Robin Brower MD  Note Started: 1:34 AM EDT 6/27/25     CHIEF COMPLAINT       Chief Complaint   Patient presents with    Abdominal Pain     Abdominal pressure        HISTORY OF PRESENT ILLNESS: 1 or more elements      History From: Patient  HPI Limitations: None     Nayana Quiroga is a 85 y.o. female who presents to the ED with pelvic pressure similar to what she has had in the past. Of note, there are notes from 5/21 and 6/5 with similar complaint.     Nursing Notes were all reviewed and agreed with or any disagreements were addressed in the HPI.     REVIEW OF SYSTEMS      Review of Systems     Positives and Pertinent negatives as per HPI.    PAST HISTORY     Past Medical History:  Past Medical History:   Diagnosis Date    Diverticula of colon     GERD (gastroesophageal reflux disease)     Hypertension     Joint pain          Past Surgical History:  Past Surgical History:   Procedure Laterality Date    CATARACT REMOVAL      OTHER SURGICAL HISTORY      tumor on right side    TUMOR REMOVAL      fatty tumor upper left arm       Family History:  Family History   Problem Relation Age of Onset    Diabetes Mother     Breast Cancer Mother     Heart Disease Mother     Hypertension Mother        Social History:  Social History     Tobacco Use    Smoking status: Former     Current packs/day: 0.50     Types: Cigarettes    Smokeless tobacco: Never   Substance Use Topics    Alcohol use: No     Alcohol/week: 0.0 standard drinks of alcohol    Drug use: No       Allergies:  Allergies   Allergen Reactions    Aspirin Nausea Only    Codeine      Other reaction(s): Not Reported This Time       CURRENT MEDICATIONS      Previous Medications    ALBUTEROL SULFATE HFA (VENTOLIN HFA) 108 (90 BASE) MCG/ACT INHALER    Inhale 2 puffs

## 2025-06-30 ENCOUNTER — HOSPITAL ENCOUNTER (OUTPATIENT)
Facility: HOSPITAL | Age: 85
Discharge: HOME | End: 2025-07-03
Attending: OBSTETRICS & GYNECOLOGY
Payer: MEDICARE

## 2025-06-30 DIAGNOSIS — N89.8 VAGINAL MASS: ICD-10-CM

## 2025-06-30 DIAGNOSIS — R10.30 LOWER ABDOMINAL PAIN: ICD-10-CM

## 2025-06-30 PROCEDURE — 6360000004 HC RX CONTRAST MEDICATION: Performed by: OBSTETRICS & GYNECOLOGY

## 2025-06-30 PROCEDURE — 74177 CT ABD & PELVIS W/CONTRAST: CPT

## 2025-06-30 RX ORDER — IOPAMIDOL 755 MG/ML
100 INJECTION, SOLUTION INTRAVASCULAR
Status: COMPLETED | OUTPATIENT
Start: 2025-06-30 | End: 2025-06-30

## 2025-06-30 RX ADMIN — IOPAMIDOL 100 ML: 755 INJECTION, SOLUTION INTRAVENOUS at 13:41

## 2025-07-28 ENCOUNTER — TELEPHONE (OUTPATIENT)
Age: 85
End: 2025-07-28

## 2025-07-28 NOTE — TELEPHONE ENCOUNTER
Patient referred to office by Dr Rosen for a Vaginal Mass.  Unable to reach patient on 7/16/25 and 7/18/25.  Called again today 7/28/25 and made appointment with daughter for Monday, August 4th @ 1:30 with Dr. Toussaint.

## (undated) DEVICE — SOL IRR SOD CL 0.9% 250ML BTL --

## (undated) DEVICE — SOLUTION IRRIG 250ML STRL H2O PLAS POUR BTL USP

## (undated) DEVICE — MICROSURGICAL INSTRUMENT ANTERIOR CHAMBER CANNULA 30GA: Brand: ALCON

## (undated) DEVICE — TAPE ADH W1INXL10YD CLTH SILK H2O RESIST CURAD

## (undated) DEVICE — Device: Brand: MALYUGIN RING SYSTEM 7.0MM

## (undated) DEVICE — B-H IRRIGATING CAN 19GA FLAT ANGLED 8MM: Brand: OPHTHALMIC CANNULA

## (undated) DEVICE — 45° KELMAN®, 0.9 MM TURBOSONICS® MINI-FLARED ABS® TIP: Brand: ALCON, KELMAN, TURBOSONICS, MINI-FLARED ABS

## (undated) DEVICE — MICROSURGICAL INSTRUMENT CAPSULE POLISHER-37 BEND, 21GA W .3MM SIDE PORT: Brand: ALCON

## (undated) DEVICE — BETADINE 5% EYE SOL

## (undated) DEVICE — OPHTHALMIC KNIFE 15°: Brand: ALCON

## (undated) DEVICE — TIPLESS W/ 0.9 MM HIGH INFUSION SLEEVES: Brand: ALCON, INFINITI, MICROSMOOTH

## (undated) DEVICE — TAPE ADH CLTH SILK H2O REPELLENT CURAD

## (undated) DEVICE — LABEL STERILIZATION W/PEN -- 50/CA

## (undated) DEVICE — KIT PT CARE CATRCT POSTOP CUST

## (undated) DEVICE — Device

## (undated) DEVICE — MICROSURGICAL INSTRUMENT IRR. CYSTITOME 25GA FORMED-REVERSE CUTTING: Brand: ALCON